# Patient Record
Sex: FEMALE | Race: BLACK OR AFRICAN AMERICAN | Employment: UNEMPLOYED | URBAN - METROPOLITAN AREA
[De-identification: names, ages, dates, MRNs, and addresses within clinical notes are randomized per-mention and may not be internally consistent; named-entity substitution may affect disease eponyms.]

---

## 2017-01-16 RX ORDER — BUDESONIDE AND FORMOTEROL FUMARATE DIHYDRATE 160; 4.5 UG/1; UG/1
AEROSOL RESPIRATORY (INHALATION)
Qty: 10.2 INHALER | Refills: 0 | Status: SHIPPED | OUTPATIENT
Start: 2017-01-16 | End: 2017-01-31 | Stop reason: SDUPTHER

## 2017-01-31 ENCOUNTER — OFFICE VISIT (OUTPATIENT)
Dept: INTERNAL MEDICINE CLINIC | Facility: CLINIC | Age: 67
End: 2017-01-31

## 2017-01-31 VITALS
RESPIRATION RATE: 18 BRPM | HEART RATE: 64 BPM | BODY MASS INDEX: 32.48 KG/M2 | HEIGHT: 66 IN | DIASTOLIC BLOOD PRESSURE: 93 MMHG | SYSTOLIC BLOOD PRESSURE: 156 MMHG | TEMPERATURE: 98.1 F | OXYGEN SATURATION: 97 % | WEIGHT: 202.1 LBS

## 2017-01-31 DIAGNOSIS — R06.2 WHEEZING: ICD-10-CM

## 2017-01-31 DIAGNOSIS — J40 BRONCHITIS: Primary | ICD-10-CM

## 2017-01-31 RX ORDER — AZITHROMYCIN 250 MG/1
TABLET, FILM COATED ORAL
Qty: 6 TAB | Refills: 0 | Status: SHIPPED | OUTPATIENT
Start: 2017-01-31 | End: 2017-08-27

## 2017-01-31 RX ORDER — BUDESONIDE AND FORMOTEROL FUMARATE DIHYDRATE 160; 4.5 UG/1; UG/1
AEROSOL RESPIRATORY (INHALATION)
Qty: 10.2 INHALER | Refills: 2 | Status: SHIPPED | OUTPATIENT
Start: 2017-01-31 | End: 2018-02-24

## 2017-01-31 RX ORDER — ALBUTEROL SULFATE 90 UG/1
1 AEROSOL, METERED RESPIRATORY (INHALATION)
Qty: 1 INHALER | Refills: 2 | Status: SHIPPED | OUTPATIENT
Start: 2017-01-31 | End: 2019-01-25 | Stop reason: SDUPTHER

## 2017-01-31 RX ORDER — CHOLECALCIFEROL (VITAMIN D3) 125 MCG
CAPSULE ORAL
COMMUNITY
End: 2019-10-17

## 2017-01-31 RX ORDER — METHYLPREDNISOLONE 4 MG/1
TABLET ORAL
Qty: 1 DOSE PACK | Refills: 0 | Status: SHIPPED | OUTPATIENT
Start: 2017-01-31 | End: 2017-02-06

## 2017-01-31 NOTE — MR AVS SNAPSHOT
Visit Information Date & Time Provider Department Dept. Phone Encounter #  
 1/31/2017  8:00 AM Benjamin Michele MD [facility] [phone] [id number] Follow-up Instructions Return if symptoms worsen or fail to improve, for follow up. Your Appointments 3/21/2017 10:00 AM  
ROUTINE CARE with Benjamin Michele MD  
[facility] Appt Note: 3 month f/up $0CP 12/21/2016 DG  
 [address] Upcoming Health Maintenance Date Due Hepatitis C Screening 1950 DTaP/Tdap/Td series (1 - Tdap) 6/19/1972 FOBT Q 1 YEAR AGE 50-75 6/19/2001 ZOSTER VACCINE AGE 60> 6/19/2011 GLAUCOMA SCREENING Q2Y 6/19/2016 OSTEOPOROSIS SCREENING (DEXA) 6/19/2016 MEDICARE YEARLY EXAM 6/19/2016 INFLUENZA AGE 9 TO ADULT 8/1/2016 Pneumococcal 65+ Low/Medium Risk (2 of 2 - PCV13) 12/7/2017 BREAST CANCER SCRN MAMMOGRAM 11/16/2018 Allergies as of 1/31/2017  Review Complete On: 1/31/2017 By: Tana Moeller LPN Severity Noted Reaction Type Reactions Penicillins  01/13/2016    Hives, Swelling SWELLING OF TONGUE Current Immunizations  Reviewed on 12/7/2016 Name Date Influenza Vaccine [place]) 10/21/2015 Pneumococcal Polysaccharide (PPSV-23) 12/7/2016 Not reviewed this visit You Were Diagnosed With   
  
 Codes Comments Bronchitis    -  Primary ICD-10-CM: J65 ICD-9-CM: 639 Wheezing     ICD-10-CM: R06.2 ICD-9-CM: 786.07 Vitals BP Pulse Temp Resp Height(growth percentile) Weight(growth percentile) (!) 156/93 (BP 1 Location: Left arm, BP Patient Position: Sitting) 64 98.1 °F (36.7 °C) (Oral) 18 5' 6\" (1.676 m) 202 lb 1.6 oz (91.7 kg) LMP SpO2 BMI OB Status Smoking Status (LMP Unknown) 97% 32.62 kg/m2 Postmenopausal Never Smoker Vitals History BMI and BSA Data Body Mass Index Body Surface Area  
 32.62 kg/m 2 2.07 m 2 Preferred Pharmacy Pharmacy Name Phone [id number] [place] - [address] [phone] Your Updated Medication List  
  
   
This list is accurate as of: 1/31/17  8:49 AM.  Always use your most recent med list.  
  
  
  
  
 albuterol 90 mcg/actuation inhaler Commonly known as:  PROVENTIL HFA, VENTOLIN HFA, PROAIR HFA Take 1 Puff by inhalation every four (4) hours as needed for Wheezing. * azithromycin 250 mg tablet Commonly known as:  Elsworth Heaps Take 2 tabs po x day 1 then 1 tab po daily days 2-5 * azithromycin 250 mg tablet Commonly known as:  Elsworth Heaps Take 2 tabs po x day 1 then 1 tab po daily days 2-5 AVERY 10-40 mg Tab Generic drug:  amLODIPine-Olmesartan TAKE 1 TAB BY MOUTH DAILY. budesonide-formoterol 160-4.5 mcg/actuation HFA inhaler Commonly known as:  SYMBICORT  
INHALE 2 PUFFS BY MOUTH TWICE A DAY Cetirizine 10 mg Cap Take  by mouth.  
  
 cyanocobalamin 1,000 mcg tablet Take 1,000 mcg by mouth daily. cyclobenzaprine 10 mg tablet Commonly known as:  FLEXERIL  
1 tab po q 8 hours prn muscle spasm  
  
 fexofenadine 60 mg tablet Commonly known as:  [place] Plenty Take 1 Tab by mouth two (2) times a day. linaclotide 145 mcg Cap capsule Commonly known as:  Keo Manju Take 1 Cap by mouth Daily (before breakfast). methylPREDNISolone 4 mg tablet Commonly known as:  MEDROL DOSEPACK  
use as directed VITAMIN D3 2,000 unit Tab Generic drug:  cholecalciferol (vitamin D3) Take  by mouth. * Notice: This list has 2 medication(s) that are the same as other medications prescribed for you. Read the directions carefully, and ask your doctor or other care provider to review them with you. Prescriptions Sent to Pharmacy Refills methylPREDNISolone (MEDROL DOSEPACK) 4 mg tablet 0 Sig: use as directed Class: Normal  
 [id number] [address] Ph #: [phone]  
 azithromycin (ZITHROMAX) 250 mg tablet 0 Sig: Take 2 tabs po x day 1 then 1 tab po daily days 2-5 Class: Normal  
 [id number] [address] Ph #: [phone]  
 albuterol (PROVENTIL HFA, VENTOLIN HFA, PROAIR HFA) 90 mcg/actuation inhaler 2 Sig: Take 1 Puff by inhalation every four (4) hours as needed for Wheezing. Class: Normal  
 Pharmacy: [address] Ph #: [phone] Route: Inhalation  
 budesonide-formoterol (SYMBICORT) 160-4.5 mcg/actuation HFA inhaler 2 Sig: INHALE 2 PUFFS BY MOUTH TWICE A DAY Class: Normal  
 Pharmacy: [address] Ph #: [phone] Follow-up Instructions Return if symptoms worsen or fail to improve, for follow up. To-Do List   
 02/28/2017 Imaging:  XR CHEST PA LAT Introducing [facility] SERVICES! [facility] introduces [employer] patient portal. Now you can access parts of your medical record, email your doctor's office, and request medication refills online. 1. In your internet browser, go to [email or web address] 2. Click on the First Time User? Click Here link in the Sign In box. You will see the New Member Sign Up page. 3. Enter your [employer] Access Code exactly as it appears below. You will not need to use this code after youve completed the sign-up process. If you do not sign up before the expiration date, you must request a new code. · [employer] Access Code: [email or web address] Expires: 3/7/2017 10:32 AM 
 
 4. Enter the last four digits of your Social Security Number (xxxx) and Date of Birth (mm/dd/yyyy) as indicated and click Submit. You will be taken to the next sign-up page. 5. Create a AchieveIt Online ID. This will be your Achie[employer] Online login ID and cannot be changed, so think of one that is secure and easy to remember. 6. Create a Achie[employer] Online password. You can change your password at any time. 7. Enter your Password Reset Question and Answer. This can be used at a later time if you forget your password. 8. Enter your e-mail address. You will receive e-mail notification when new information is available in [address] 9. Click Sign Up. You can now view and download portions of your medical record. 10. Click the Download Summary menu link to download a portable copy of your medical information. If you have questions, please visit the Frequently Asked Questions section of the [employer] Online website. Remember, [employer] is NOT to be used for urgent needs. For medical emergencies, dial 911. Now available from your iPhone and Android! Please provide this summary of care documentation to your next provider. Your primary care clinician is listed as Jessica Bruno. If you have any questions after today's visit, please call [phone].

## 2017-01-31 NOTE — PROGRESS NOTES
Room 1    Chief Complaint   Patient presents with    Wheezing     Patient states she wheezes more at night. Uses inhalers. States she doesn't think they are working. Has taken antibiotics previously     1. Have you been to the ER, urgent care clinic since your last visit? Hospitalized since your last visit? No    2. Have you seen or consulted any other health care providers outside of the [address] [place] [address] since your last visit? Include any pap smears or colon screening. No     Health Maintenance Due   Topic Date Due    Hepatitis C Screening  1950    DTaP/Tdap/Td series (1 - Tdap) 06/19/1972    FOBT Q 1 YEAR AGE 50-75  06/19/2001    ZOSTER VACCINE AGE 60>  06/19/2011    GLAUCOMA SCREENING Q2Y  06/19/2016    OSTEOPOROSIS SCREENING (DEXA)  06/19/2016    MEDICARE YEARLY EXAM  06/19/2016    INFLUENZA AGE 9 TO ADULT  08/01/2016     Advance directives reviewed.  Patient received information previously

## 2017-01-31 NOTE — PROGRESS NOTES
Subjective:      Britney Celeste is a 73 y.o. female who presents today for   Chief Complaint   Patient presents with    Wheezing     Patient states she wheezes more at night. Uses inhalers. States she doesn't think they are working. Has taken antibiotics previously     Patient says she is still having coughing and wheezing. A month ago was given a zpack. Using symbicort  twice daily and is using albuterol as needed sometimes every 4 hours. She says she coughs and wheezes at night and sometimes feels like mucus is \"stuck\" in chest. She takes Allegra daily. Patient Active Problem List    Diagnosis Date Noted    Essential hypertension with goal blood pressure less than 140/90 05/27/2016    Allergic rhinitis 05/27/2016    Lipoma 11/23/2015     Current Outpatient Prescriptions   Medication Sig Dispense Refill    cholecalciferol, vitamin D3, (VITAMIN D3) 2,000 unit tab Take  by mouth.  SYMBICORT 160-4.5 mcg/actuation HFA inhaler INHALE 2 PUFFS BY MOUTH TWICE A DAY 10.2 Inhaler 0    albuterol (PROVENTIL HFA, VENTOLIN HFA, PROAIR HFA) 90 mcg/actuation inhaler Take 1 Puff by inhalation every four (4) hours as needed for Wheezing. 1 Inhaler 1    AVERY 10-40 mg tab TAKE 1 TAB BY MOUTH DAILY. 30 Tab 2    fexofenadine (ALLEGRA) 60 mg tablet Take 1 Tab by mouth two (2) times a day. 60 Tab 3    Cetirizine 10 mg cap Take  by mouth.  cyanocobalamin 1,000 mcg tablet Take 1,000 mcg by mouth daily.  cyclobenzaprine (FLEXERIL) 10 mg tablet 1 tab po q 8 hours prn muscle spasm 30 Tab 1    azithromycin (ZITHROMAX) 250 mg tablet Take 2 tabs po x day 1 then 1 tab po daily days 2-5 6 Tab 0    linaclotide (LINZESS) 145 mcg cap capsule Take 1 Cap by mouth Daily (before breakfast).  30 Cap 3     Allergies   Allergen Reactions    Penicillins Hives and Swelling     SWELLING OF TONGUE     Past Medical History   Diagnosis Date    Adverse effect of anesthesia      WOKE UP 1X DURING PROCEDURE    Arthritis     Environmental allergies     GERD (gastroesophageal reflux disease)     Heart murmur     Hypertension     Lipoma 11/23/2015    Nausea & vomiting      ONLY NAUSEA    PUD (peptic ulcer disease)      Past Surgical History   Procedure Laterality Date    Hx gi       COLONOSCOPY    Hx retinal detachment repair Left     Hx gyn       EXPLORATORY LAP    Hx other surgical  1988     removed lipoma of head    Hx other surgical  1/21/16      LOU for pinched nerve in back    Hx other surgical  1/27/16     EXCISE RECURRENT LIPOMA POSTERIOR SCALP     Family History   Problem Relation Age of Onset    Hypertension Mother     Kidney Disease Mother      WAS ON DIALYSIS    Cancer Father      leukemia    Hypertension Sister     Other Brother      BRAIN ANEURYSM    Hypertension Brother     Anesth Problems Neg Hx      Social History   Substance Use Topics    Smoking status: Never Smoker    Smokeless tobacco: Never Used    Alcohol use 2.4 oz/week     4 Glasses of wine per week        Review of Systems    A comprehensive review of systems was negative except for that written in the HPI. Objective:     Visit Vitals    BP (!) 156/93 (BP 1 Location: Left arm, BP Patient Position: Sitting)    Pulse 64    Temp 98.1 °F (36.7 °C) (Oral)    Resp 18    Ht 5' 6\" (1.676 m)    Wt 202 lb 1.6 oz (91.7 kg)    LMP  (LMP Unknown)    SpO2 97%    BMI 32.62 kg/m2     General:  Alert, cooperative, no distress, appears stated age. Head:  Normocephalic, without obvious abnormality, atraumatic. Eyes:  Conjunctivae/corneas clear. PERRL, EOMs intact. Fundi benign. Ears:  Normal TMs and external ear canals both ears. Nose: Nares normal. Septum midline. Mucosa normal. No drainage or sinus tenderness. Throat: Lips, mucosa, and tongue normal. Teeth and gums normal.   Neck: Supple, symmetrical, trachea midline, no adenopathy, thyroid: no enlargement/tenderness/nodules, no carotid bruit and no JVD.    Back:   Symmetric, no curvature. ROM normal. No CVA tenderness. Lungs:   Clear to auscultation bilaterally. Chest wall:  No tenderness or deformity. Heart:  Regular rate and rhythm, S1, S2 normal, no murmur, click, rub or gallop. Abdomen:   Soft, non-tender. Bowel sounds normal. No masses,  No organomegaly. Extremities: Extremities normal, atraumatic, no cyanosis or edema. Pulses: 2+ and symmetric all extremities. Skin: Skin color, texture, turgor normal. No rashes or lesions. Lymph nodes: Cervical, supraclavicular, and axillary nodes normal.   Neurologic: CNII-XII intact. Normal strength, sensation and reflexes throughout. Assessment/Plan:       ICD-10-CM ICD-9-CM    1. Bronchitis J40 490 XR CHEST PA LAT   2. Wheezing R06.2 786.07 XR CHEST PA LAT     Change allegra to zyrtec daily  Medrol dose pack  Chest xray order today  Discontinue OTC cold medicines as bp is elevated    Follow-up Disposition: Not on File   Advised her to call back or return to office if symptoms worsen/change/persist.  Discussed expected course/resolution/complications of diagnosis in detail with patient. Medication risks/benefits/costs/interactions/alternatives discussed with patient. She was given an after visit summary which includes diagnoses, current medications, & vitals. She expressed understanding with the diagnosis and plan.

## 2017-02-01 ENCOUNTER — HOSPITAL ENCOUNTER (OUTPATIENT)
Dept: GENERAL RADIOLOGY | Age: 67
Discharge: HOME | End: 2017-02-01
Payer: COMMERCIAL

## 2017-02-01 DIAGNOSIS — J40 BRONCHITIS: ICD-10-CM

## 2017-02-01 DIAGNOSIS — R06.2 WHEEZING: ICD-10-CM

## 2017-02-01 PROCEDURE — 71020 XR CHEST PA LAT: CPT

## 2017-02-03 ENCOUNTER — TELEPHONE (OUTPATIENT)
Dept: INTERNAL MEDICINE CLINIC | Facility: CLINIC | Age: 67
End: 2017-02-03

## 2017-02-03 NOTE — TELEPHONE ENCOUNTER
----- Message from Sharri García sent at 2/3/2017  2:21 PM EST -----  Regarding: Dr Gato Sanchez returning call.  Best contact [phone]

## 2017-02-06 NOTE — PROGRESS NOTES
Per Dr. Raffi Ortega, pt informed that her chest x-ray was normal. Pt voiced understanding.  Christie Molina LPN

## 2017-02-09 ENCOUNTER — TELEPHONE (OUTPATIENT)
Dept: INTERNAL MEDICINE CLINIC | Facility: CLINIC | Age: 67
End: 2017-02-09

## 2017-02-09 DIAGNOSIS — R05.9 COUGH: Primary | ICD-10-CM

## 2017-02-09 DIAGNOSIS — R06.02 SOB (SHORTNESS OF BREATH): ICD-10-CM

## 2017-02-09 NOTE — TELEPHONE ENCOUNTER
----- Message from [employer] sent at 2/8/2017  5:08 PM EST -----  Regarding: Dr. Chelsea García Telephone  Patient would like to see if she can get a Nebulizer, inhaler is not working.  Contact is [phone]

## 2017-02-10 NOTE — TELEPHONE ENCOUNTER
Please provide me with form to order nebulizer    Also let patient know i placed a referral to pulmonary.  i would like her to be evaluated

## 2017-04-12 ENCOUNTER — HOSPITAL ENCOUNTER (OUTPATIENT)
Dept: GENERAL RADIOLOGY | Age: 67
Discharge: HOME | End: 2017-04-12
Payer: COMMERCIAL

## 2017-04-12 DIAGNOSIS — R06.02 SHORTNESS OF BREATH: ICD-10-CM

## 2017-04-12 PROCEDURE — 71020 XR CHEST PA LAT: CPT

## 2017-05-16 RX ORDER — AMLODIPINE BESYLATE AND OLMESARTAN MEDOXOMIL 10; 20 MG/1; MG/1
TABLET, FILM COATED ORAL
Qty: 30 TAB | Refills: 5 | Status: SHIPPED | OUTPATIENT
Start: 2017-05-16 | End: 2018-01-27 | Stop reason: SDUPTHER

## 2017-08-08 ENCOUNTER — TELEPHONE (OUTPATIENT)
Dept: INTERNAL MEDICINE CLINIC | Facility: CLINIC | Age: 67
End: 2017-08-08

## 2017-08-08 NOTE — TELEPHONE ENCOUNTER
Called and spoke to pharmacist who states that insurance will not cover the Symbicort. A new medication is needed to replace. Suggestions are been faxed from pharmacy.

## 2017-08-08 NOTE — TELEPHONE ENCOUNTER
Patient mentioned that she never received a response regarding her prior authorization in regards to her Symbicot. Please started the process, if it haven't been started already. Patient have been out of her medication for almost a month now. Please advise!

## 2017-08-09 NOTE — TELEPHONE ENCOUNTER
Called and spoke with insurance company completed P. A for symbicort which was approved. According to pharmacist pt will still have a copay of 61.00$.  Adonis Escudero LPN

## 2017-08-21 ENCOUNTER — OFFICE VISIT (OUTPATIENT)
Dept: INTERNAL MEDICINE CLINIC | Facility: CLINIC | Age: 67
End: 2017-08-21

## 2017-08-21 VITALS
RESPIRATION RATE: 18 BRPM | DIASTOLIC BLOOD PRESSURE: 79 MMHG | BODY MASS INDEX: 32.47 KG/M2 | HEIGHT: 66 IN | HEART RATE: 60 BPM | OXYGEN SATURATION: 100 % | TEMPERATURE: 98.7 F | SYSTOLIC BLOOD PRESSURE: 168 MMHG | WEIGHT: 202 LBS

## 2017-08-21 DIAGNOSIS — R05.9 COUGH: Primary | ICD-10-CM

## 2017-08-21 DIAGNOSIS — R06.83 SNORING: ICD-10-CM

## 2017-08-21 RX ORDER — OMEPRAZOLE 20 MG/1
20 CAPSULE, DELAYED RELEASE ORAL DAILY
Qty: 30 CAP | Refills: 1 | Status: SHIPPED | OUTPATIENT
Start: 2017-08-21 | End: 2017-10-23 | Stop reason: SDUPTHER

## 2017-08-21 NOTE — PROGRESS NOTES
Chief Complaint   Patient presents with    Medication Evaluation     wheezing continues. 1. Have you been to the ER, urgent care clinic since your last visit? Hospitalized since your last visit? No    2. Have you seen or consulted any other health care providers outside of the [address] [place] [address] since your last visit? Include any pap smears or colon screening.  No

## 2017-08-21 NOTE — MR AVS SNAPSHOT
Visit Information Date & Time Provider Department Dept. Phone Encounter #  
 8/21/2017  4:15 PM Lucy Gracie, [address] [facility] [phone] [id number] Follow-up Instructions Return in about 4 weeks (around 9/18/2017) for follow up on cough,wheezing. Upcoming Health Maintenance Date Due Hepatitis C Screening 1950 DTaP/Tdap/Td series (1 - Tdap) 6/19/1972 FOBT Q 1 YEAR AGE 50-75 6/19/2001 ZOSTER VACCINE AGE 60> 4/19/2011 GLAUCOMA SCREENING Q2Y 6/19/2016 OSTEOPOROSIS SCREENING (DEXA) 6/19/2016 MEDICARE YEARLY EXAM 6/19/2016 INFLUENZA AGE 9 TO ADULT 8/1/2017 Pneumococcal 65+ Low/Medium Risk (2 of 2 - PCV13) 12/7/2017 BREAST CANCER SCRN MAMMOGRAM 11/16/2018 Allergies as of 8/21/2017  Review Complete On: 1/31/2017 By: Bianca Shock, LPN Severity Noted Reaction Type Reactions Penicillins  01/13/2016    Hives, Swelling SWELLING OF TONGUE Current Immunizations  Reviewed on 12/7/2016 Name Date Influenza Vaccine [place] Blight) 10/21/2015 Pneumococcal Polysaccharide (PPSV-23) 12/7/2016 Not reviewed this visit You Were Diagnosed With   
  
 Codes Comments Cough    -  Primary ICD-10-CM: Y54 ICD-9-CM: 786.2 Snoring     ICD-10-CM: R06.83 
ICD-9-CM: 786.09 Vitals BP Pulse Temp Resp Height(growth percentile) Weight(growth percentile) 168/79 (BP 1 Location: Left arm, BP Patient Position: Sitting) 60 98.7 °F (37.1 °C) (Oral) 18 5' 6\" (1.676 m) 202 lb (91.6 kg) LMP SpO2 BMI OB Status Smoking Status (LMP Unknown) 100% 32.6 kg/m2 Postmenopausal Never Smoker Vitals History BMI and BSA Data Body Mass Index Body Surface Area  
 32.6 kg/m 2 2.07 m 2 Preferred Pharmacy Pharmacy Name Phone [id number] [address] [phone] Your Updated Medication List  
  
   
 This list is accurate as of: 8/21/17  5:34 PM.  Always use your most recent med list.  
  
  
  
  
 albuterol 90 mcg/actuation inhaler Commonly known as:  PROVENTIL HFA, VENTOLIN HFA, PROAIR HFA Take 1 Puff by inhalation every four (4) hours as needed for Wheezing. azithromycin 250 mg tablet Commonly known as:  April Caller Take 2 tabs po x day 1 then 1 tab po daily days 2-5 * AVERY 10-40 mg Tab Generic drug:  amLODIPine-Olmesartan TAKE 1 TAB BY MOUTH DAILY. * AVERY 10-20 mg Tab Generic drug:  amLODIPine-Olmesartan TAKE 1 TABLET BY MOUTH EVERY DAY  
  
 budesonide-formoterol 160-4.5 mcg/actuation HFA inhaler Commonly known as:  SYMBICORT  
INHALE 2 PUFFS BY MOUTH TWICE A DAY Cetirizine 10 mg Cap Take  by mouth.  
  
 cyanocobalamin 1,000 mcg tablet Take 1,000 mcg by mouth daily. cyclobenzaprine 10 mg tablet Commonly known as:  FLEXERIL  
1 tab po q 8 hours prn muscle spasm  
  
 fexofenadine 60 mg tablet Commonly known as:  Kiah Aurora Take 1 Tab by mouth two (2) times a day. omeprazole 20 mg capsule Commonly known as:  PRILOSEC Take 1 Cap by mouth daily. VITAMIN D3 2,000 unit Tab Generic drug:  cholecalciferol (vitamin D3) Take  by mouth. * Notice: This list has 2 medication(s) that are the same as other medications prescribed for you. Read the directions carefully, and ask your doctor or other care provider to review them with you. Prescriptions Sent to Pharmacy Refills  
 omeprazole (PRILOSEC) 20 mg capsule 1 Sig: Take 1 Cap by mouth daily. Class: Normal  
 Pharmacy: [facility]acy [address] [facility] Ph #: [phone] Route: Oral  
  
We Performed the Following REFERRAL TO PULMONARY DISEASE [YAJ62 Custom] Comments:  
 Ramila Wagner. MD Kwame 
[facility] [address] [facility] [address] [facility], [address] [facility] [address] [facility] Phone: [phone] Fax: [phone] REFERRAL TO SLEEP STUDIES [REF99 Custom] Follow-up Instructions Return in about 4 weeks (around 9/18/2017) for follow up on cough,wheezing. Referral Information Referral ID Referred By Referred To  
  
 [id number] Fabienne Lim [facility] [address] [facility], [address] Visits Status Start Date End Date 1 New Request 8/21/17 8/21/18 If your referral has a status of pending review or denied, additional information will be sent to support the outcome of this decision. Referral ID Referred By Referred To  
 [id number] Bhavin Riggs, [address] Phone: [phone] Fax: [phone] Visits Status Start Date End Date 1 New Request 8/21/17 8/21/18 If your referral has a status of pending review or denied, additional information will be sent to support the outcome of this decision. Introducing [facility] SERVICES! Mariann Lynn introduces [employer] patient portal. Now you can access parts of your medical record, email your doctor's office, and request medication refills online. 1. In your internet browser, go to [email or web address] 2. Click on the First Time User? Click Here link in the Sign In box. You will see the New Member Sign Up page. 3. Enter your TradingView Access Code exactly as it appears below. You will not need to use this code after youve completed the sign-up process. If you do not sign up before the expiration date, you must request a new code. · Trading[employer] Access Code: [email or web address] Expires: 11/19/2017  3:57 PM 
 
4. Enter the last four digits of your Social Security Number (xxxx) and Date of Birth (mm/dd/yyyy) as indicated and click Submit. You will be taken to the next sign-up page. 5. Create a TradingView ID.  This will be your TradingView login ID and cannot be changed, so think of one that is secure and easy to remember. 6. Create a [employer] password. You can change your password at any time. 7. Enter your Password Reset Question and Answer. This can be used at a later time if you forget your password. 8. Enter your e-mail address. You will receive e-mail notification when new information is available in [address] 9. Click Sign Up. You can now view and download portions of your medical record. 10. Click the Download Summary menu link to download a portable copy of your medical information. If you have questions, please visit the Frequently Asked Questions section of the [employer] website. Remember, [employer] is NOT to be used for urgent needs. For medical emergencies, dial 911. Now available from your iPhone and Android! Please provide this summary of care documentation to your next provider. Your primary care clinician is listed as Molly Hurley. If you have any questions after today's visit, please call [phone].

## 2017-08-21 NOTE — PROGRESS NOTES
Subjective:      Sergio Mccullough is a 78 y.o. female who presents today for No chief complaint on file.    htn- patient states she is compliant with her medication     Hyperlipidemia-diet controlled, can not tolerate statins     HSV 1 and 2 antibody positive per most recent labs     Vit D deficiency- Patient says she is taking an OTC Vit D supplement      patient still c/o mucus and sob. She denies any cp. She denies any leg swelling. She feels hoarse, she has a dry cough, wheezing. She ran out of her symbicort and symptoms  got worse. She has tried zpack x 2, medrol pack, albuterol, symbicort. She has a thick white sputum. She has no smoking history. She has tried allegra and zyrtec for allergies however symptoms persist.  On omeprazole for acid reflux. Chest xray 1/2017 and 4/2017 both normal. Most recent EKG in January showed sinus pablo      Patient Active Problem List    Diagnosis Date Noted    Essential hypertension with goal blood pressure less than 140/90 05/27/2016    Allergic rhinitis 05/27/2016    Lipoma 11/23/2015     Current Outpatient Prescriptions   Medication Sig Dispense Refill    AVERY 10-20 mg tab TAKE 1 TABLET BY MOUTH EVERY DAY 30 Tab 5    cholecalciferol, vitamin D3, (VITAMIN D3) 2,000 unit tab Take  by mouth.  azithromycin (ZITHROMAX) 250 mg tablet Take 2 tabs po x day 1 then 1 tab po daily days 2-5 6 Tab 0    albuterol (PROVENTIL HFA, VENTOLIN HFA, PROAIR HFA) 90 mcg/actuation inhaler Take 1 Puff by inhalation every four (4) hours as needed for Wheezing. 1 Inhaler 2    budesonide-formoterol (SYMBICORT) 160-4.5 mcg/actuation HFA inhaler INHALE 2 PUFFS BY MOUTH TWICE A DAY 10.2 Inhaler 2    AVERY 10-40 mg tab TAKE 1 TAB BY MOUTH DAILY. 30 Tab 2    fexofenadine (ALLEGRA) 60 mg tablet Take 1 Tab by mouth two (2) times a day. 60 Tab 3    Cetirizine 10 mg cap Take  by mouth.  cyanocobalamin 1,000 mcg tablet Take 1,000 mcg by mouth daily.       cyclobenzaprine (FLEXERIL) 10 mg tablet 1 tab po q 8 hours prn muscle spasm 30 Tab 1     Allergies   Allergen Reactions    Penicillins Hives and Swelling     SWELLING OF TONGUE     Past Medical History:   Diagnosis Date    Adverse effect of anesthesia     WOKE UP 1X DURING PROCEDURE    Arthritis     Environmental allergies     GERD (gastroesophageal reflux disease)     Heart murmur     Hypertension     Lipoma 11/23/2015    Nausea & vomiting     ONLY NAUSEA    PUD (peptic ulcer disease)      Past Surgical History:   Procedure Laterality Date    HX GI      COLONOSCOPY    HX GYN      EXPLORATORY LAP    HX OTHER SURGICAL  1988    removed lipoma of head    HX OTHER SURGICAL  1/21/16     LOU for pinched nerve in back    HX OTHER SURGICAL  1/27/16    EXCISE RECURRENT LIPOMA POSTERIOR SCALP    HX RETINAL DETACHMENT REPAIR Left      Family History   Problem Relation Age of Onset    Hypertension Mother     Kidney Disease Mother      WAS ON DIALYSIS    Cancer Father      leukemia    Hypertension Sister     Other Brother      BRAIN ANEURYSM    Hypertension Brother     Anesth Problems Neg Hx      Social History   Substance Use Topics    Smoking status: Never Smoker    Smokeless tobacco: Never Used    Alcohol use 2.4 oz/week     4 Glasses of wine per week        Review of Systems    A comprehensive review of systems was negative except for that written in the HPI. Objective:     Visit Vitals    LMP  (LMP Unknown)     General:  Alert, cooperative, no distress, appears stated age. Head:  Normocephalic, without obvious abnormality, atraumatic. Eyes:  Conjunctivae/corneas clear. PERRL, EOMs intact. Fundi benign. Ears:  Normal TMs and external ear canals both ears. Nose: Nares normal. Septum midline. Mucosa normal. No drainage or sinus tenderness.    Throat: Lips, mucosa, and tongue normal. Teeth and gums normal.   Neck: Supple, symmetrical, trachea midline, no adenopathy, thyroid: no enlargement/tenderness/nodules, no carotid bruit and no JVD. Back:   Symmetric, no curvature. ROM normal. No CVA tenderness. Lungs:   Clear to auscultation bilaterally. Chest wall:  No tenderness or deformity. Heart:  Regular rate and rhythm, S1, S2 normal, no murmur, click, rub or gallop. Abdomen:   Soft, non-tender. Bowel sounds normal. No masses,  No organomegaly. Extremities: Extremities normal, atraumatic, no cyanosis or edema. Pulses: 2+ and symmetric all extremities. Skin: Skin color, texture, turgor normal. No rashes or lesions. Lymph nodes: Cervical, supraclavicular, and axillary nodes normal.   Neurologic: CNII-XII intact. Normal strength, sensation and reflexes throughout. Assessment/Plan:       ICD-10-CM ICD-9-CM    1. Cough R05 786.2 REFERRAL TO PULMONARY DISEASE      REFERRAL TO SLEEP STUDIES   2. Snoring R06.83 786.09 REFERRAL TO SLEEP STUDIES   3. Hypertension- BP not well controlled. Resume full tablet of Lesli    Follow-up Disposition: Not on File   Advised her to call back or return to office if symptoms worsen/change/persist.  Discussed expected course/resolution/complications of diagnosis in detail with patient. Medication risks/benefits/costs/interactions/alternatives discussed with patient. She was given an after visit summary which includes diagnoses, current medications, & vitals. She expressed understanding with the diagnosis and plan.

## 2017-09-05 RX ORDER — FEXOFENADINE HCL 60 MG
TABLET ORAL
Qty: 60 TAB | Refills: 0 | Status: SHIPPED | OUTPATIENT
Start: 2017-09-05 | End: 2017-10-12 | Stop reason: SDUPTHER

## 2017-10-13 RX ORDER — FEXOFENADINE HCL 60 MG
TABLET ORAL
Qty: 60 TAB | Refills: 0 | Status: SHIPPED | OUTPATIENT
Start: 2017-10-13 | End: 2018-11-27 | Stop reason: SDUPTHER

## 2017-10-25 RX ORDER — OMEPRAZOLE 20 MG/1
CAPSULE, DELAYED RELEASE ORAL
Qty: 30 CAP | Refills: 1 | Status: SHIPPED | OUTPATIENT
Start: 2017-10-25 | End: 2021-09-24

## 2018-01-10 ENCOUNTER — DOCUMENTATION ONLY (OUTPATIENT)
Dept: SLEEP MEDICINE | Age: 68
End: 2018-01-10

## 2018-01-15 ENCOUNTER — OFFICE VISIT (OUTPATIENT)
Dept: SLEEP MEDICINE | Age: 68
End: 2018-01-15

## 2018-01-15 ENCOUNTER — HOSPITAL ENCOUNTER (OUTPATIENT)
Dept: SLEEP MEDICINE | Age: 68
Discharge: HOME | End: 2018-01-15
Payer: COMMERCIAL

## 2018-01-15 VITALS
WEIGHT: 200 LBS | OXYGEN SATURATION: 100 % | BODY MASS INDEX: 32.14 KG/M2 | HEIGHT: 66 IN | SYSTOLIC BLOOD PRESSURE: 140 MMHG | HEART RATE: 62 BPM | DIASTOLIC BLOOD PRESSURE: 81 MMHG

## 2018-01-15 DIAGNOSIS — I10 ESSENTIAL HYPERTENSION WITH GOAL BLOOD PRESSURE LESS THAN 140/90: ICD-10-CM

## 2018-01-15 DIAGNOSIS — Z87.09 H/O ALLERGIC RHINITIS: ICD-10-CM

## 2018-01-15 DIAGNOSIS — G47.33 OSA (OBSTRUCTIVE SLEEP APNEA): Primary | ICD-10-CM

## 2018-01-15 PROCEDURE — 95806 SLEEP STUDY UNATT&RESP EFFT: CPT | Performed by: INTERNAL MEDICINE

## 2018-01-15 NOTE — MR AVS SNAPSHOT
Visit Information Date & Time Provider Department Dept. Phone Encounter #  
 1/15/2018 11:00 AM [facility] [id number] Follow-up Instructions Return if symptoms worsen or fail to improve. Follow-up and Disposition History Upcoming Health Maintenance Date Due Hepatitis C Screening 1950 DTaP/Tdap/Td series (1 - Tdap) 6/19/1972 FOBT Q 1 YEAR AGE 50-75 6/19/2001 ZOSTER VACCINE AGE 60> 4/19/2011 GLAUCOMA SCREENING Q2Y 6/19/2016 OSTEOPOROSIS SCREENING (DEXA) 6/19/2016 MEDICARE YEARLY EXAM 6/19/2016 Influenza Age 5 to Adult 8/1/2017 Pneumococcal 65+ Low/Medium Risk (2 of 2 - PCV13) 12/7/2017 BREAST CANCER SCRN MAMMOGRAM 11/16/2018 Allergies as of 1/15/2018  Review Complete On: 1/15/2018 By: Hayden Hairston MD  
  
 Severity Noted Reaction Type Reactions Penicillins  01/13/2016    Hives, Swelling SWELLING OF TONGUE Current Immunizations  Reviewed on 12/7/2016 Name Date Influenza Vaccine Kandace Nissen) 10/21/2015 Pneumococcal Polysaccharide (PPSV-23) 12/7/2016 Not reviewed this visit You Were Diagnosed With   
  
 Codes Comments MARIELOS (obstructive sleep apnea)    -  Primary ICD-10-CM: G47.33 
ICD-9-CM: 327.23 Essential hypertension with goal blood pressure less than 140/90     ICD-10-CM: I10 
ICD-9-CM: 401.9 BMI 32.0-32.9,adult     ICD-10-CM: T28.65 
ICD-9-CM: V85.32   
 H/O allergic rhinitis     ICD-10-CM: Z87.09 
ICD-9-CM: V12.69 Vitals BP Pulse Height(growth percentile) Weight(growth percentile) LMP SpO2  
 140/81 62 5' 6\" (1.676 m) 200 lb (90.7 kg) (LMP Unknown) 100% BMI OB Status Smoking Status 32.28 kg/m2 Postmenopausal Never Smoker Vitals History BMI and BSA Data Body Mass Index Body Surface Area  
 32.28 kg/m 2 2.06 m 2 Preferred Pharmacy Pharmacy Name Phone [id number] Aaron Jorgensen 23 [phone] Your Updated Medication List  
  
   
This list is accurate as of: 1/15/18 12:07 PM.  Always use your most recent med list.  
  
  
  
  
 albuterol 90 mcg/actuation inhaler Commonly known as:  PROVENTIL HFA, VENTOLIN HFA, PROAIR HFA Take 1 Puff by inhalation every four (4) hours as needed for Wheezing. AVERY 10-20 mg Tab Generic drug:  amLODIPine-Olmesartan TAKE 1 TABLET BY MOUTH EVERY DAY  
  
 budesonide-formoterol 160-4.5 mcg/actuation Hfaa Commonly known as:  SYMBICORT  
INHALE 2 PUFFS BY MOUTH TWICE A DAY Cetirizine 10 mg Cap Take  by mouth.  
  
 cyanocobalamin 1,000 mcg tablet Take 1,000 mcg by mouth daily. cyclobenzaprine 10 mg tablet Commonly known as:  FLEXERIL  
1 tab po q 8 hours prn muscle spasm  
  
 fexofenadine 60 mg tablet Commonly known as:  Quillian Cowman TAKE 1 TAB BY MOUTH TWO (2) TIMES A DAY. omeprazole 20 mg capsule Commonly known as:  PRILOSEC  
TAKE ONE CAPSULE BY MOUTH EVERY DAY  
  
 VITAMIN D3 2,000 unit Tab Generic drug:  cholecalciferol (vitamin D3) Take  by mouth. We Performed the Following SLEEP STUDY UNATTENDED, 4 CHANNEL [id number] CPT(R)] Follow-up Instructions Return if symptoms worsen or fail to improve. Introducing hospitals & HEALTH SERVICES! Darren Colón introduces [employer] patient portal. Now you can access parts of your medical record, email your doctor's office, and request medication refills online. 1. In your internet browser, go to [email or web address] 2. Click on the First Time User? Click Here link in the Sign In box. You will see the New Member Sign Up page. 3. Enter your [employer] Access Code exactly as it appears below. You will not need to use this code after youve completed the sign-up process.  If you do not sign up before the expiration date, you must request a new code. 
 
· [employer] Access Code: [email or web address] Expires: 4/15/2018 11:39 AM 
 
4. Enter the last four digits of your Social Security Number (xxxx) and Date of Birth (mm/dd/yyyy) as indicated and click Submit. You will be taken to the next sign-up page. 5. Create a [employer] ID. This will be your [employer] login ID and cannot be changed, so think of one that is secure and easy to remember. 6. Create a [employer] password. You can change your password at any time. 7. Enter your Password Reset Question and Answer. This can be used at a later time if you forget your password. 8. Enter your e-mail address. You will receive e-mail notification when new information is available in [address] 9. Click Sign Up. You can now view and download portions of your medical record. 10. Click the Download Summary menu link to download a portable copy of your medical information. If you have questions, please visit the Frequently Asked Questions section of the [employer] website. Remember, [employer] is NOT to be used for urgent needs. For medical emergencies, dial 911. Now available from your iPhone and Android! Please provide this summary of care documentation to your next provider. Your primary care clinician is listed as Kassidy Rubin. If you have any questions after today's visit, please call [phone].

## 2018-01-15 NOTE — PROGRESS NOTES
[address] [facility] [place]. [place], [address]  Tel.  [phone]  Fax. 100 [facility] 60  [place], [address] [facility]  Tel.  [phone]  Fax. [phone] [facility] [place] [address] [place]   Tel.  [phone]  Fax. [phone]       S>Alina Sanon is a 78 y.o. female seen today to receive a home sleep testing unit (HST). · Patient was educated on proper hookup and operation of the HST. · Instruction forms and documentation were reviewed and signed. · The patient demonstrated good understanding of the HST. O>    Visit Vitals    /81    Pulse 62    Ht 5' 6\" (1.676 m)    Wt 200 lb (90.7 kg)    LMP  (LMP Unknown)    SpO2 100%    BMI 32.28 kg/m2    Neck circ. in \"inches\": 15    A>  1. MARIELOS (obstructive sleep apnea)    2. Essential hypertension with goal blood pressure less than 140/90    3. BMI 32.0-32.9,adult    4. H/O allergic rhinitis          P>  · General information regarding operations and maintenance of the device was provided. · She was provided information on sleep apnea including coresponding risk factors and the importance of proper treatment. · Follow-up appointment was made to return the HST. She will be contacted once the results have been reviewed. · She was asked to contact our office for any problems regarding her home sleep test study.

## 2018-01-15 NOTE — PROGRESS NOTES
[address] [facility] [place]. [place], [address]  Tel.  [phone]  Fax. 100 [facility] 60  [place], [address] [facility]  Tel.  [phone]  Fax. [phone] [address] [facility] [address] [place]   Tel.  [phone]  Fax. [phone]         Subjective:      Jamilah Johnson is an 78 y.o. female referred for evaluation for a sleep disorder. She complains of snoring associated with snorting, awakening in the middle of the night because of no specific reason. Symptoms began several years ago, gradually worsening since that time. She usually can fall asleep in 30 - 60 minutes. Family or house members note snoring. She denies completely or partially paralyzed while falling asleep or waking up. Jamilah Johnson does wake up frequently at night. She is bothered by waking up too early and left unable to get back to sleep. She actually sleeps about 5 hours at night and wakes up about 3 times during the night. She does work shifts:  First Shift;Second Shift. Tom Valentin indicates she does get too little sleep at night. Her bedtime is 2200. She awakens at 0430. She does not take naps. She has the following observed behaviors: Loud snoring;  . Other remarks:   She denies of an urge to move extremities due to discomfort or other sensation and denies of dream enactment behavior.     Sullivan Sleepiness Score: 4     Allergies   Allergen Reactions    Penicillins Hives and Swelling     SWELLING OF TONGUE         Current Outpatient Prescriptions:     AVERY 10-20 mg tab, TAKE 1 TABLET BY MOUTH EVERY DAY, Disp: 30 Tab, Rfl: 5    cholecalciferol, vitamin D3, (VITAMIN D3) 2,000 unit tab, Take  by mouth., Disp: , Rfl:     albuterol (PROVENTIL HFA, VENTOLIN HFA, PROAIR HFA) 90 mcg/actuation inhaler, Take 1 Puff by inhalation every four (4) hours as needed for Wheezing., Disp: 1 Inhaler, Rfl: 2    budesonide-formoterol (SYMBICORT) 160-4.5 mcg/actuation HFA inhaler, INHALE 2 PUFFS BY MOUTH TWICE A DAY, Disp: 10.2 Inhaler, Rfl: 2    Cetirizine 10 mg cap, Take  by mouth., Disp: , Rfl:     omeprazole (PRILOSEC) 20 mg capsule, TAKE ONE CAPSULE BY MOUTH EVERY DAY, Disp: 30 Cap, Rfl: 1    fexofenadine (ALLEGRA) 60 mg tablet, TAKE 1 TAB BY MOUTH TWO (2) TIMES A DAY., Disp: 60 Tab, Rfl: 0    cyanocobalamin 1,000 mcg tablet, Take 1,000 mcg by mouth daily. , Disp: , Rfl:     cyclobenzaprine (FLEXERIL) 10 mg tablet, 1 tab po q 8 hours prn muscle spasm, Disp: 30 Tab, Rfl: 1     She  has a past medical history of Adverse effect of anesthesia; Arthritis; Environmental allergies; GERD (gastroesophageal reflux disease); Heart murmur; Hypertension; Lipoma (11/23/2015); Nausea & vomiting; and PUD (peptic ulcer disease). She  has a past surgical history that includes hx gi; hx retinal detachment repair (Left); hx gyn; hx other surgical (1988); hx other surgical (1/21/16 ); and hx other surgical (1/27/16). She family history includes Cancer in her father; Hypertension in her brother, mother, and sister; Kidney Disease in her mother; Other in her brother. There is no history of Anesth Problems. She  reports that she has never smoked. She has never used smokeless tobacco. She reports that she drinks about 2.4 oz of alcohol per week  She reports that she does not use illicit drugs.      Review of Systems:  Constitutional:  No significant weight loss or weight gain  Eyes:  No blurred vision  CVS:  No significant chest pain  Pulm:  No significant shortness of breath  GI:  No significant nausea or vomiting  :  No significant nocturia  Musculoskeletal:  No significant joint pain at night  Skin:  No significant rashes  Neuro:  No significant dizziness   Psych:  No active mood issues    Sleep Review of Systems: notable for difficulty falling asleep; frequent awakenings at night;  regular dreaming not reported; no nightmares ; no early morning headaches; no memory problems; no concentration issues; no history of any automobile or occupational accidents due to daytime drowsiness. Objective:     Visit Vitals    /81    Pulse 62    Ht 5' 6\" (1.676 m)    Wt 200 lb (90.7 kg)    LMP  (LMP Unknown)    SpO2 100%    BMI 32.28 kg/m2         General:   Not in acute distress   Eyes:  Anicteric sclerae, no obvious strabismus   Nose:  No obvious nasal septum deviation    Oropharynx:   Class 4 oropharyngeal outlet, thick tongue base, uvula could not be seen due to low-lying soft palate, narrow tonsilo-pharyngeal pilars   Tonsils:   tonsils are not seen due to low-lying soft palate   Neck:   Neck circ. in \"inches\": 15; midline trachea   Chest/Lungs:  Equal lung expansion, clear on auscultation    CVS:  Normal rate, regular rhythm; no JVD   Skin:  Warm to touch; no obvious rashes   Neuro:  No focal deficits ; no obvious tremor    Psych:  Normal affect,  normal countenance;          Assessment:       ICD-10-CM ICD-9-CM    1. MARIELOS (obstructive sleep apnea) G47.33 327.23 SLEEP STUDY UNATTENDED, 4 CHANNEL   2. Essential hypertension with goal blood pressure less than 140/90 I10 401.9    3. BMI 32.0-32.9,adult Z68.32 V85.32    4. H/O allergic rhinitis Z87.09 V12.69          Plan:     * The patient currently has a Low Risk for having sleep apnea. STOP-BANG score 3.  * Sleep testing was ordered for initial evaluation. * She was provided information on sleep apnea including coresponding risk factors and the importance of proper treatment. * Treatment options if indicated were reviewed today. Patient agrees to a trial of PAP therapy if indicated. * Counseling was provided regarding proper sleep hygiene (including effect of light on sleep), paradoxical intention, stimulus and safe driving. * Effect of sleep disturbance on weight was reviewed. We have recommended a dedicated weight loss through appropriate diet and an exercise regiment as significant weight reduction has been shown to reduce severity of obstructive sleep apnea.      * Patient agrees to telephone [phone]  follow-up by myself or lead sleep technologist shortly after sleep study to review results and plan final management.     (patient has given permission for a message to be left regarding test results and further management if patient cannot be cannot be reached directly). Thank you for allowing us to participate in your patient's medical care. We'll keep you updated on these investigations. Donavon Rey MD, FAASM  Electronically signed.  01/15/18

## 2018-01-16 ENCOUNTER — DOCUMENTATION ONLY (OUTPATIENT)
Dept: SLEEP MEDICINE | Age: 68
End: 2018-01-16

## 2018-01-18 ENCOUNTER — TELEPHONE (OUTPATIENT)
Dept: SLEEP MEDICINE | Age: 68
End: 2018-01-18

## 2018-01-18 NOTE — TELEPHONE ENCOUNTER
HSAT Returned-MIDLO    Date of Study: 1/15/18    The following information was gathered from the patients study log:    Further information provided: No log information was provided.

## 2018-01-18 NOTE — TELEPHONE ENCOUNTER
Patient is to be contacted by lead sleep technologist regarding results of Sleep Testing which was indicative of an average AHI of 3.3 per hour with an SpO2 nguyễn of 88% and SpO2 of < 88% being 0 minutes. Patient should return for repeat testing due to presence of significant risk factors for Obstructive Sleep Apnea - STOP- BANG 3.

## 2018-01-19 ENCOUNTER — DOCUMENTATION ONLY (OUTPATIENT)
Dept: SLEEP MEDICINE | Age: 68
End: 2018-01-19

## 2018-01-19 NOTE — PROGRESS NOTES
This note is being routed to Dr. Jimmy Garza.   Sleep Medicine consult note and sleep study report in patient's chart for review.     Thank you for the referral.

## 2018-01-22 ENCOUNTER — TELEPHONE (OUTPATIENT)
Dept: SLEEP MEDICINE | Age: 68
End: 2018-01-22

## 2018-01-22 NOTE — TELEPHONE ENCOUNTER
Left detailed voice message per patient requested when she called to office, she was not able to talk because she was at work.  Informed patient of need to repeat HST due to presence of significant  Risk factors for MARIELOS

## 2018-01-29 RX ORDER — AMLODIPINE BESYLATE AND OLMESARTAN MEDOXOMIL 10; 20 MG/1; MG/1
TABLET, FILM COATED ORAL
Qty: 30 TAB | Refills: 5 | Status: SHIPPED | OUTPATIENT
Start: 2018-01-29 | End: 2018-02-26 | Stop reason: SDUPTHER

## 2018-02-02 ENCOUNTER — DOCUMENTATION ONLY (OUTPATIENT)
Dept: SLEEP MEDICINE | Age: 68
End: 2018-02-02

## 2018-02-05 ENCOUNTER — TELEPHONE (OUTPATIENT)
Dept: SLEEP MEDICINE | Age: 68
End: 2018-02-05

## 2018-02-05 DIAGNOSIS — I10 ESSENTIAL HYPERTENSION WITH GOAL BLOOD PRESSURE LESS THAN 140/90: ICD-10-CM

## 2018-02-05 DIAGNOSIS — G47.33 OSA (OBSTRUCTIVE SLEEP APNEA): Primary | ICD-10-CM

## 2018-02-05 NOTE — TELEPHONE ENCOUNTER
HSAT device was returned however there was no data available to download and this will need to be repeated.

## 2018-02-16 NOTE — TELEPHONE ENCOUNTER
Patient inquired about results of HSAT, informed patient there was no data available and would need to repeat. Patient would like to to an in lab study per she has doen HSAT twice and doesn't want to repeat and she would like more complete testing.  Please send order or please advise

## 2018-02-19 ENCOUNTER — OFFICE VISIT (OUTPATIENT)
Dept: INTERNAL MEDICINE CLINIC | Facility: CLINIC | Age: 68
End: 2018-02-19

## 2018-02-19 ENCOUNTER — TELEPHONE (OUTPATIENT)
Dept: SLEEP MEDICINE | Age: 68
End: 2018-02-19

## 2018-02-19 VITALS
DIASTOLIC BLOOD PRESSURE: 85 MMHG | BODY MASS INDEX: 32.33 KG/M2 | HEIGHT: 66 IN | TEMPERATURE: 97.5 F | RESPIRATION RATE: 18 BRPM | SYSTOLIC BLOOD PRESSURE: 140 MMHG | WEIGHT: 201.2 LBS | HEART RATE: 60 BPM

## 2018-02-19 DIAGNOSIS — R10.13 EPIGASTRIC PAIN: ICD-10-CM

## 2018-02-19 DIAGNOSIS — J01.00 ACUTE MAXILLARY SINUSITIS, RECURRENCE NOT SPECIFIED: Primary | ICD-10-CM

## 2018-02-19 RX ORDER — AZITHROMYCIN 250 MG/1
TABLET, FILM COATED ORAL
Qty: 6 TAB | Refills: 0 | Status: SHIPPED | OUTPATIENT
Start: 2018-02-19 | End: 2018-09-25

## 2018-02-19 NOTE — PROGRESS NOTES
Chief Complaint   Patient presents with    Ear Pain     right sided ear pain, some pain behind her right eye, and right nostril      1. Have you been to the ER, urgent care clinic since your last visit? Hospitalized since your last visit? No    2. Have you seen or consulted any other health care providers outside of the [address] [place] [address] since your last visit? Include any pap smears or colon screening.  No

## 2018-02-19 NOTE — PROGRESS NOTES
Subjective:      Jacquelyn Fuentes is a 78 y.o. female who presents today for   Chief Complaint   Patient presents with    Ear Pain     right sided ear pain, some pain behind her right eye, and right nostril      Patient in today with concerns of right ear. Ear feels clogged. Right side of face hurts also ear on the right. No blood or purulent nasal exudate  She feels a little lightheaded    Patient Active Problem List    Diagnosis Date Noted    Essential hypertension with goal blood pressure less than 140/90 05/27/2016    Allergic rhinitis 05/27/2016    Lipoma 11/23/2015     Current Outpatient Prescriptions   Medication Sig Dispense Refill    AVERY 10-20 mg tab TAKE 1 TABLET BY MOUTH EVERY DAY 30 Tab 5    omeprazole (PRILOSEC) 20 mg capsule TAKE ONE CAPSULE BY MOUTH EVERY DAY 30 Cap 1    fexofenadine (ALLEGRA) 60 mg tablet TAKE 1 TAB BY MOUTH TWO (2) TIMES A DAY. 60 Tab 0    cholecalciferol, vitamin D3, (VITAMIN D3) 2,000 unit tab Take  by mouth.  albuterol (PROVENTIL HFA, VENTOLIN HFA, PROAIR HFA) 90 mcg/actuation inhaler Take 1 Puff by inhalation every four (4) hours as needed for Wheezing. 1 Inhaler 2    cyanocobalamin 1,000 mcg tablet Take 1,000 mcg by mouth daily.  cyclobenzaprine (FLEXERIL) 10 mg tablet 1 tab po q 8 hours prn muscle spasm 30 Tab 1    budesonide-formoterol (SYMBICORT) 160-4.5 mcg/actuation HFA inhaler INHALE 2 PUFFS BY MOUTH TWICE A DAY 10.2 Inhaler 2    Cetirizine 10 mg cap Take  by mouth.        Allergies   Allergen Reactions    Penicillins Hives and Swelling     SWELLING OF TONGUE     Past Medical History:   Diagnosis Date    Adverse effect of anesthesia     WOKE UP 1X DURING PROCEDURE    Arthritis     Environmental allergies     GERD (gastroesophageal reflux disease)     Heart murmur     Hypertension     Lipoma 11/23/2015    Nausea & vomiting     ONLY NAUSEA    PUD (peptic ulcer disease)      Past Surgical History:   Procedure Laterality Date    HX GI COLONOSCOPY    HX GYN      EXPLORATORY LAP    HX OTHER SURGICAL  1988    removed lipoma of head    HX OTHER SURGICAL  1/21/16     LOU for pinched nerve in back    HX OTHER SURGICAL  1/27/16    EXCISE RECURRENT LIPOMA POSTERIOR SCALP    HX RETINAL DETACHMENT REPAIR Left      Family History   Problem Relation Age of Onset    Hypertension Mother     Kidney Disease Mother      WAS ON DIALYSIS    Cancer Father      leukemia    Hypertension Sister     Other Brother      BRAIN ANEURYSM    Hypertension Brother     Anesth Problems Neg Hx      Social History   Substance Use Topics    Smoking status: Never Smoker    Smokeless tobacco: Never Used    Alcohol use 2.4 oz/week     4 Glasses of wine per week      Comment: social        Review of Systems    A comprehensive review of systems was negative except for that written in the HPI. Objective:     Visit Vitals    Resp 18    Ht 5' 6\" (1.676 m)    Wt 201 lb 3.2 oz (91.3 kg)    LMP  (LMP Unknown)    BMI 32.47 kg/m2     General:  Alert, cooperative, no distress, appears stated age. Head:  Normocephalic, without obvious abnormality, atraumatic. Eyes:  Conjunctivae/corneas clear. PERRL, EOMs intact. Fundi benign. Ears:  Normal TMs and external ear canals both ears. Nose: Nares normal. Septum midline. Mucosa erythematous. No drainage positive maxillary sinus tenderness. Throat: Lips, mucosa, and tongue normal. Teeth and gums normal.   Neck: Supple, symmetrical, trachea midline, no adenopathy, thyroid: no enlargement/tenderness/nodules, no carotid bruit and no JVD. Back:   Symmetric, no curvature. ROM normal. No CVA tenderness. Lungs:   Clear to auscultation bilaterally. Chest wall:  No tenderness or deformity. Heart:  Regular rate and rhythm, S1, S2 normal, no murmur, click, rub or gallop. Abdomen:   Soft, non-tender. Bowel sounds normal. No masses,  No organomegaly. Extremities: Extremities normal, atraumatic, no cyanosis or edema. Pulses: 2+ and symmetric all extremities. Skin: Skin color, texture, turgor normal. No rashes or lesions. Lymph nodes: Cervical, supraclavicular, and axillary nodes normal.   Neurologic: CNII-XII intact. Normal strength, sensation and reflexes throughout. Assessment/Plan:       ICD-10-CM ICD-9-CM    1. Acute maxillary sinusitis, recurrence not specified J01.00 461.0 ZPACK  Nasal saline rinses   2. Epigastric pain R10.13 789.06 REFERRAL TO GASTROENTEROLOGY       Follow-up Disposition: Not on File   Advised her to call back or return to office if symptoms worsen/change/persist.  Discussed expected course/resolution/complications of diagnosis in detail with patient. Medication risks/benefits/costs/interactions/alternatives discussed with patient. She was given an after visit summary which includes diagnoses, current medications, & vitals. She expressed understanding with the diagnosis and plan.

## 2018-02-19 NOTE — MR AVS SNAPSHOT
[address] [facility] 
[phone] Patient: Alba Antonio MRN: [id number] HYF:[id number] Visit Information Date & Time Provider Department Dept. Phone Encounter #  
 2/19/2018  1:30 PM Betsey Crawford, [address] [facility] Internal Medicine [phone] [id number] Follow-up Instructions Return if symptoms worsen or fail to improve, for follow up. Your Appointments 4/3/2018 10:15 AM  
COMPLETE PHYSICAL with Betsey Crawford MD  
[facility] Internal Medicine [address] [facility]) Appt Note: CPE w \"fasting\" labs $0CP 02/16/2018 DG  
 [address] Upcoming Health Maintenance Date Due Hepatitis C Screening 1950 DTaP/Tdap/Td series (1 - Tdap) 6/19/1972 FOBT Q 1 YEAR AGE 50-75 6/19/2001 ZOSTER VACCINE AGE 60> 4/19/2011 GLAUCOMA SCREENING Q2Y 6/19/2016 OSTEOPOROSIS SCREENING (DEXA) 6/19/2016 MEDICARE YEARLY EXAM 6/19/2016 Influenza Age 5 to Adult 8/1/2017 Pneumococcal 65+ Low/Medium Risk (2 of 2 - PCV13) 12/7/2017 BREAST CANCER SCRN MAMMOGRAM 11/16/2018 Allergies as of 2/19/2018  Review Complete On: 2/19/2018 By: Silverio Smalls LPN Severity Noted Reaction Type Reactions Penicillins  01/13/2016    Hives, Swelling SWELLING OF TONGUE Current Immunizations  Reviewed on 12/7/2016 Name Date Influenza Vaccine Julane Jaquan) 10/21/2015 Pneumococcal Polysaccharide (PPSV-23) 12/7/2016 Not reviewed this visit You Were Diagnosed With   
  
 Codes Comments Acute maxillary sinusitis, recurrence not specified    -  Primary ICD-10-CM: J01.00 ICD-9-CM: 461.0 Epigastric pain     ICD-10-CM: R10.13 ICD-9-CM: 789.06 Vitals BP Pulse Temp Resp Height(growth percentile) Weight(growth percentile) 140/85 60 97.5 °F (36.4 °C) (Oral) 18 5' 6\" (1.676 m) 201 lb 3.2 oz (91.3 kg) LMP BMI OB Status Smoking Status (LMP Unknown) 32.47 kg/m2 Postmenopausal Never Smoker Vitals History BMI and BSA Data Body Mass Index Body Surface Area  
 32.47 kg/m 2 2.06 m 2 Preferred Pharmacy Pharmacy Name Phone [facility] #[id number] CERVANTES [facility] 23 [phone] Your Updated Medication List  
  
   
This list is accurate as of: 2/19/18  2:03 PM.  Always use your most recent med list.  
  
  
  
  
 albuterol 90 mcg/actuation inhaler Commonly known as:  PROVENTIL HFA, VENTOLIN HFA, PROAIR HFA Take 1 Puff by inhalation every four (4) hours as needed for Wheezing. azithromycin 250 mg tablet Commonly known as:  Susan Held Take 2 tabs po x day 1 then 1 tab po days 2-5 AVERY 10-20 mg Tab Generic drug:  amLODIPine-Olmesartan TAKE 1 TABLET BY MOUTH EVERY DAY  
  
 budesonide-formoterol 160-4.5 mcg/actuation Hfaa Commonly known as:  SYMBICORT  
INHALE 2 PUFFS BY MOUTH TWICE A DAY Cetirizine 10 mg Cap Take  by mouth.  
  
 cyanocobalamin 1,000 mcg tablet Take 1,000 mcg by mouth daily. cyclobenzaprine 10 mg tablet Commonly known as:  FLEXERIL  
1 tab po q 8 hours prn muscle spasm  
  
 fexofenadine 60 mg tablet Commonly known as:  Melvin [place] TAKE 1 TAB BY MOUTH TWO (2) TIMES A DAY. omeprazole 20 mg capsule Commonly known as:  PRILOSEC  
TAKE ONE CAPSULE BY MOUTH EVERY DAY  
  
 VITAMIN D3 2,000 unit Tab Generic drug:  cholecalciferol (vitamin D3) Take  by mouth. Prescriptions Sent to Pharmacy Refills  
 azithromycin (ZITHROMAX) 250 mg tablet 0 Sig: Take 2 tabs po x day 1 then 1 tab po days 2-5 Class: Normal  
 Pharmacy: [facility]/pharmacy Keila Roman, [facility] [address]  #: [phone] We Performed the Following REFERRAL TO GASTROENTEROLOGY [NPU36 Custom] Follow-up Instructions Return if symptoms worsen or fail to improve, for follow up. Referral Information Referral ID Referred By Referred To  
  
 [id number] Cornelio WARREN MD   
   [address] [place] [address] [address] [place] [address] Ave Phone: [phone] Fax: [phone] Visits Status Start Date End Date 1 New Request 2/19/18 2/19/19 If your referral has a status of pending review or denied, additional information will be sent to support the outcome of this decision. Introducing [facility]! [employer] introduces [employer] patient portal. Now you can access parts of your medical record, email your doctor's office, and request medication refills online. 1. In your internet browser, go to [email or web address] 2. Click on the First Time User? Click Here link in the Sign In box. You will see the New Member Sign Up page. 3. Enter your [employer] Access Code exactly as it appears below. You will not need to use this code after youve completed the sign-up process. If you do not sign up before the expiration date, you must request a new code. · [employer] Access Code: [email or web address] Expires: 4/15/2018 11:39 AM 
 
4. Enter the last four digits of your Social Security Number (xxxx) and Date of Birth (mm/dd/yyyy) as indicated and click Submit. You will be taken to the next sign-up page. 5. Create a [employer] ID. This will be your [employer] login ID and cannot be changed, so think of one that is secure and easy to remember. 6. Create a [employer] password. You can change your password at any time. 7. Enter your Password Reset Question and Answer. This can be used at a later time if you forget your password. 8. Enter your e-mail address. You will receive e-mail notification when new information is available in [address] 9. Click Sign Up. You can now view and download portions of your medical record. 10. Click the Download Summary menu link to download a portable copy of your medical information. If you have questions, please visit the Frequently Asked Questions section of the [employer] website. Remember, [employer] is NOT to be used for urgent needs. For medical emergencies, dial 911. Now available from your iPhone and Android! Please provide this summary of care documentation to your next provider. Your primary care clinician is listed as Jah Geronimo. If you have any questions after today's visit, please call [phone].

## 2018-02-19 NOTE — TELEPHONE ENCOUNTER
Left voicemail to scheduled sleep study.  No PA required for CPT code 98301 (PSG) per [facility] call ref# [id number]

## 2018-02-19 NOTE — TELEPHONE ENCOUNTER
HSAT inconclusive for MARIELOS X 2. Encounter Diagnoses   Name Primary?     MARIELOS (obstructive sleep apnea) Yes    Essential hypertension with goal blood pressure less than 140/90      Orders Placed This Encounter    POLYSOMNOGRAPHY 1 NIGHT     Standing Status:   Future     Standing Expiration Date:   8/20/2018     Order Specific Question:   Reason for Exam     Answer:   MARIELOS

## 2018-02-27 RX ORDER — AMLODIPINE AND OLMESARTAN MEDOXOMIL 10; 20 MG/1; MG/1
TABLET ORAL
Qty: 30 TAB | Refills: 5 | Status: SHIPPED | OUTPATIENT
Start: 2018-02-27 | End: 2018-09-16 | Stop reason: SDUPTHER

## 2018-03-14 ENCOUNTER — DOCUMENTATION ONLY (OUTPATIENT)
Dept: SLEEP MEDICINE | Age: 68
End: 2018-03-14

## 2018-09-17 RX ORDER — AMLODIPINE AND OLMESARTAN MEDOXOMIL 10; 20 MG/1; MG/1
TABLET ORAL
Qty: 30 TAB | Refills: 5 | Status: SHIPPED | OUTPATIENT
Start: 2018-09-17 | End: 2019-10-17

## 2018-09-21 ENCOUNTER — OFFICE VISIT (OUTPATIENT)
Dept: INTERNAL MEDICINE CLINIC | Facility: CLINIC | Age: 68
End: 2018-09-21

## 2018-09-21 ENCOUNTER — HOSPITAL ENCOUNTER (OUTPATIENT)
Dept: LAB | Age: 68
Discharge: HOME | End: 2018-09-21
Payer: COMMERCIAL

## 2018-09-21 VITALS
HEART RATE: 66 BPM | OXYGEN SATURATION: 98 % | BODY MASS INDEX: 33.43 KG/M2 | SYSTOLIC BLOOD PRESSURE: 142 MMHG | WEIGHT: 208 LBS | TEMPERATURE: 98.7 F | RESPIRATION RATE: 18 BRPM | HEIGHT: 66 IN | DIASTOLIC BLOOD PRESSURE: 81 MMHG

## 2018-09-21 DIAGNOSIS — J32.9 SINUSITIS, UNSPECIFIED CHRONICITY, UNSPECIFIED LOCATION: ICD-10-CM

## 2018-09-21 DIAGNOSIS — I10 ESSENTIAL HYPERTENSION WITH GOAL BLOOD PRESSURE LESS THAN 140/90: ICD-10-CM

## 2018-09-21 DIAGNOSIS — M88.9 PAGET'S BONE DISEASE: ICD-10-CM

## 2018-09-21 DIAGNOSIS — G89.29 CHRONIC LEFT-SIDED LOW BACK PAIN WITH LEFT-SIDED SCIATICA: ICD-10-CM

## 2018-09-21 DIAGNOSIS — M54.42 CHRONIC LEFT-SIDED LOW BACK PAIN WITH LEFT-SIDED SCIATICA: ICD-10-CM

## 2018-09-21 DIAGNOSIS — Z12.4 PAP SMEAR FOR CERVICAL CANCER SCREENING: ICD-10-CM

## 2018-09-21 DIAGNOSIS — M81.0 OSTEOPOROSIS, UNSPECIFIED OSTEOPOROSIS TYPE, UNSPECIFIED PATHOLOGICAL FRACTURE PRESENCE: ICD-10-CM

## 2018-09-21 DIAGNOSIS — E55.9 VITAMIN D DEFICIENCY: ICD-10-CM

## 2018-09-21 DIAGNOSIS — Z00.00 MEDICARE ANNUAL WELLNESS VISIT, INITIAL: Primary | ICD-10-CM

## 2018-09-21 LAB
BILIRUB UR QL STRIP: NEGATIVE
GLUCOSE UR-MCNC: NEGATIVE MG/DL
KETONES P FAST UR STRIP-MCNC: NEGATIVE MG/DL
PH UR STRIP: 5.5 [PH] (ref 4.6–8)
PROT UR QL STRIP: NEGATIVE
SP GR UR STRIP: 1.01 (ref 1–1.03)
UA UROBILINOGEN AMB POC: NORMAL (ref 0.2–1)
URINALYSIS CLARITY POC: CLEAR
URINALYSIS COLOR POC: YELLOW
URINE BLOOD POC: NEGATIVE
URINE LEUKOCYTES POC: NEGATIVE
URINE NITRITES POC: NEGATIVE

## 2018-09-21 PROCEDURE — 88175 CYTOPATH C/V AUTO FLUID REDO: CPT | Performed by: INTERNAL MEDICINE

## 2018-09-21 RX ORDER — CYCLOBENZAPRINE HCL 5 MG
5 TABLET ORAL
Qty: 30 TAB | Refills: 0 | Status: SHIPPED | OUTPATIENT
Start: 2018-09-21 | End: 2019-10-17

## 2018-09-21 RX ORDER — TERBINAFINE HYDROCHLORIDE 250 MG/1
250 TABLET ORAL DAILY
COMMUNITY
End: 2019-10-17

## 2018-09-21 RX ORDER — AZITHROMYCIN 250 MG/1
250 TABLET, FILM COATED ORAL SEE ADMIN INSTRUCTIONS
Qty: 6 TAB | Refills: 0 | Status: SHIPPED | OUTPATIENT
Start: 2018-09-21 | End: 2018-09-26

## 2018-09-21 RX ORDER — AMLODIPINE AND OLMESARTAN MEDOXOMIL 10; 20 MG/1; MG/1
TABLET ORAL
Qty: 30 TAB | Refills: 6 | Status: SHIPPED | OUTPATIENT
Start: 2018-09-21 | End: 2018-09-25

## 2018-09-21 NOTE — PROGRESS NOTES
Subjective:      Aurelia Lance is a 80 y.o. female who presents today for   Chief Complaint   Patient presents with    Complete Physical     Patient states she will be having eye surgery on 10/5/2018         Physical      Patient having some trouble with lower back, left sided back pain radiating down leg. Feels weak  She has seen ortho in the past Dr. Cam Becerril and had an epidural      Patient in today for complete physical and pap     Mammogram normal summer 2018 [facility]  Pap smear will be updated today  DEXA- will give order today  Colonoscopy done Aug 24  Dr. Jacki Basilio- treated for H. Pylori, given rx for simethicone  ophthalmologist - will have cataract removal on oct 5    Dr. Wells Read     Influenza vaccine patient will get at work  Pneumovax 12/2016  TDAP up todate within 10 year window  She states her zostavax is up to date     Constipation: Takes Fiber1 supplement daily for constipation. High fiber diet. Takes miralax daily      Generic form of leeann is causing swelling, patient would like to request prior auth for leeann        would like pap and full STD screen         Patient Active Problem List    Diagnosis Date Noted    Essential hypertension with goal blood pressure less than 140/90 05/27/2016    Allergic rhinitis 05/27/2016    Lipoma 11/23/2015     Current Outpatient Prescriptions   Medication Sig Dispense Refill    terbinafine HCl (LAMISIL) 250 mg tablet Take 250 mg by mouth daily.  amLODIPine-Olmesartan 10-20 mg tab TAKE 1 TABLET BY MOUTH EVERY DAY 30 Tab 5    omeprazole (PRILOSEC) 20 mg capsule TAKE ONE CAPSULE BY MOUTH EVERY DAY 30 Cap 1    fexofenadine (ALLEGRA) 60 mg tablet TAKE 1 TAB BY MOUTH TWO (2) TIMES A DAY. 60 Tab 0    albuterol (PROVENTIL HFA, VENTOLIN HFA, PROAIR HFA) 90 mcg/actuation inhaler Take 1 Puff by inhalation every four (4) hours as needed for Wheezing.  1 Inhaler 2    cyclobenzaprine (FLEXERIL) 10 mg tablet 1 tab po q 8 hours prn muscle spasm 30 Tab 1    azithromycin (ZITHROMAX) 250 mg tablet Take 2 tabs po x day 1 then 1 tab po days 2-5 6 Tab 0    cholecalciferol, vitamin D3, (VITAMIN D3) 2,000 unit tab Take  by mouth.  cyanocobalamin 1,000 mcg tablet Take 1,000 mcg by mouth daily. Allergies   Allergen Reactions    Penicillins Hives and Swelling     SWELLING OF TONGUE     Past Medical History:   Diagnosis Date    Adverse effect of anesthesia     WOKE UP 1X DURING PROCEDURE    Arthritis     Environmental allergies     GERD (gastroesophageal reflux disease)     Heart murmur     Hypertension     Lipoma 11/23/2015    Nausea & vomiting     ONLY NAUSEA    PUD (peptic ulcer disease)      Past Surgical History:   Procedure Laterality Date    HX GI      COLONOSCOPY    HX GYN      EXPLORATORY LAP    HX OTHER SURGICAL  1988    removed lipoma of head    HX OTHER SURGICAL  1/21/16     LOU for pinched nerve in back    HX OTHER SURGICAL  1/27/16    EXCISE RECURRENT LIPOMA POSTERIOR SCALP    HX RETINAL DETACHMENT REPAIR Left      Family History   Problem Relation Age of Onset    Hypertension Mother     Kidney Disease Mother      WAS ON DIALYSIS    Cancer Father      leukemia    Hypertension Sister     Other Brother      BRAIN ANEURYSM    Hypertension Brother     Anesth Problems Neg Hx      Social History   Substance Use Topics    Smoking status: Never Smoker    Smokeless tobacco: Never Used    Alcohol use 2.4 oz/week     4 Glasses of wine per week      Comment: social        Review of Systems    A comprehensive review of systems was negative except for that written in the HPI. Objective:     Visit Vitals    /81 (BP 1 Location: Left arm, BP Patient Position: Sitting)    Pulse 66    Temp 98.7 °F (37.1 °C) (Oral)    Resp 18    Ht 5' 6\" (1.676 m)    Wt 208 lb (94.3 kg)    LMP  (LMP Unknown)    SpO2 98%    BMI 33.57 kg/m2     General:  Alert, cooperative, no distress, appears stated age.    Head:  Normocephalic, without obvious abnormality, atraumatic. Eyes:  Conjunctivae/corneas clear. PERRL, EOMs intact. Fundi benign. Ears:  Normal TMs and external ear canals both ears. Nose: Nares normal. Septum midline. Mucosa normal. No drainage or sinus tenderness. Throat: Lips, mucosa, and tongue normal. Teeth and gums normal.   Neck: Supple, symmetrical, trachea midline, no adenopathy, thyroid: no enlargement/tenderness/nodules, no carotid bruit and no JVD. Back:   Symmetric, no curvature. ROM normal. No CVA tenderness. Lungs:   Clear to auscultation bilaterally. Chest wall:  No tenderness or deformity. Heart:  Regular rate and rhythm, S1, S2 normal, no murmur, click, rub or gallop. Abdomen:   Soft, non-tender. Bowel sounds normal. No masses,  No organomegaly. Extremities: Extremities normal, atraumatic, no cyanosis or edema. Pulses: 2+ and symmetric all extremities. Skin: Skin color, texture, turgor normal. No rashes or lesions. Lymph nodes: Cervical, supraclavicular, and axillary nodes normal.   Neurologic: CNII-XII intact. Normal strength, sensation and reflexes throughout. Assessment/Plan:       ICD-10-CM ICD-9-CM    1. Medicare annual wellness visit, initial G41.09 T56.0 METABOLIC PANEL, COMPREHENSIVE      LIPID PANEL      CBC WITH AUTOMATED DIFF      AMB POC URINALYSIS DIP STICK AUTO W/O MICRO      VITAMIN D, 25 HYDROXY      HEMOGLOBIN A1C WITH EAG      DEXA BONE DENSITY STUDY AXIAL      PAP IG, RFX HPV ASCU, 16&18,45([id number])      REFERRAL TO ORTHOPEDIC SURGERY      RPR      HIV 1/2 AG/AB, 4TH GENERATION,W RFLX CONFIRM      NUSWAB VAGINITIS PLUS   2. Chronic left-sided low back pain with left-sided sciatica M54.42 724.2 REFERRAL TO ORTHOPEDIC SURGERY    G89.29 724.3      338.29    3. Essential hypertension with goal blood pressure less than 140/90 I10 401.9    4. Sinusitis, unspecified chronicity, unspecified location J32.9 473.9    5.  Paget's bone disease M88.9 731.0 VITAMIN D, 25 HYDROXY      DEXA BONE DENSITY STUDY AXIAL 6. Pap smear for cervical cancer screening Z12.4 V76.2 PAP IG, RFX HPV ASCU, 16&18,45([id number])   7. Osteoporosis, unspecified osteoporosis type, unspecified pathological fracture presence M81.0 733.00 VITAMIN D, 25 HYDROXY      DEXA BONE DENSITY STUDY AXIAL   8. Vitamin D deficiency E55.9 268.9 VITAMIN D, 25 HYDROXY       Follow-up Disposition: Not on File   Advised her to call back or return to office if symptoms worsen/change/persist.  Discussed expected course/resolution/complications of diagnosis in detail with patient. Medication risks/benefits/costs/interactions/alternatives discussed with patient. She was given an after visit summary which includes diagnoses, current medications, & vitals. She expressed understanding with the diagnosis and plan. This is an Initial Medicare Annual Wellness Exam (AWV) (Performed 12 months after IPPE or effective date of Medicare Part B enrollment, Once in a lifetime)    I have reviewed the patient's medical history in detail and updated the computerized patient record. History     Past Medical History:   Diagnosis Date    Adverse effect of anesthesia     WOKE UP 1X DURING PROCEDURE    Arthritis     Environmental allergies     GERD (gastroesophageal reflux disease)     Heart murmur     Hypertension     Lipoma 11/23/2015    Nausea & vomiting     ONLY NAUSEA    PUD (peptic ulcer disease)       Past Surgical History:   Procedure Laterality Date    HX GI      COLONOSCOPY    HX GYN      EXPLORATORY LAP    HX OTHER SURGICAL  1988    removed lipoma of head    HX OTHER SURGICAL  1/21/16     LOU for pinched nerve in back    HX OTHER SURGICAL  1/27/16    EXCISE RECURRENT LIPOMA POSTERIOR SCALP    HX RETINAL DETACHMENT REPAIR Left      Current Outpatient Prescriptions   Medication Sig Dispense Refill    terbinafine HCl (LAMISIL) 250 mg tablet Take 250 mg by mouth daily.       azithromycin (ZITHROMAX) 250 mg tablet Take 1 Tab by mouth See Admin Instructions for 5 days. 6 Tab 0    amLODIPine-Olmesartan 10-20 mg tab TAKE 1 TABLET BY MOUTH EVERY DAY 30 Tab 5    omeprazole (PRILOSEC) 20 mg capsule TAKE ONE CAPSULE BY MOUTH EVERY DAY 30 Cap 1    fexofenadine (ALLEGRA) 60 mg tablet TAKE 1 TAB BY MOUTH TWO (2) TIMES A DAY. 60 Tab 0    albuterol (PROVENTIL HFA, VENTOLIN HFA, PROAIR HFA) 90 mcg/actuation inhaler Take 1 Puff by inhalation every four (4) hours as needed for Wheezing. 1 Inhaler 2    cyclobenzaprine (FLEXERIL) 10 mg tablet 1 tab po q 8 hours prn muscle spasm 30 Tab 1    azithromycin (ZITHROMAX) 250 mg tablet Take 2 tabs po x day 1 then 1 tab po days 2-5 6 Tab 0    cholecalciferol, vitamin D3, (VITAMIN D3) 2,000 unit tab Take  by mouth.  cyanocobalamin 1,000 mcg tablet Take 1,000 mcg by mouth daily. Allergies   Allergen Reactions    Penicillins Hives and Swelling     SWELLING OF TONGUE     Family History   Problem Relation Age of Onset    Hypertension Mother     Kidney Disease Mother      WAS ON DIALYSIS    Cancer Father      leukemia    Hypertension Sister     Other Brother      BRAIN ANEURYSM    Hypertension Brother     Anesth Problems Neg Hx      Social History   Substance Use Topics    Smoking status: Never Smoker    Smokeless tobacco: Never Used    Alcohol use 2.4 oz/week     4 Glasses of wine per week      Comment: social     Patient Active Problem List   Diagnosis Code    Lipoma D17.9    Essential hypertension with goal blood pressure less than 140/90 I10    Allergic rhinitis J30.9       Depression Risk Factor Screening:     PHQ over the last two weeks 1/31/2017   Little interest or pleasure in doing things Not at all   Feeling down, depressed, irritable, or hopeless Not at all   Total Score PHQ 2 0     Alcohol Risk Factor Screening:   Alcohol rare    Functional Ability and Level of Safety:     Hearing Loss  Hearing is good.   Hearing loss right ear    Activities of Daily Living  The home contains: no safety equipment  Patient does total self care    Fall Risk  Fall Risk Assessment, last 12 mths 9/21/2018   Able to walk? Yes   Fall in past 12 months? Yes   Fall with injury? Yes   Number of falls in past 12 months 4   Fall Risk Score 5       Abuse Screen  Not abused    Cognitive Screening   Evaluation of Cognitive Function:  Has your family/caregiver stated any concerns about your memory; normal    Patient Care Team   Patient Care Team:  Zuleyma An MD as PCP - General (Internal Medicine)  Jerry Booker MD as Physician (Sleep Medicine)    Assessment/Plan   Education and counseling provided:      Diagnoses and all orders for this visit:    1. Medicare annual wellness visit, initial  -     METABOLIC PANEL, COMPREHENSIVE  -     LIPID PANEL  -     CBC WITH AUTOMATED DIFF  -     AMB POC URINALYSIS DIP STICK AUTO W/O MICRO  -     VITAMIN D, 25 HYDROXY  -     HEMOGLOBIN A1C WITH EAG  -     DEXA BONE DENSITY STUDY AXIAL; Future  -     PAP IG, RFX HPV ASCU, 16&18,45([id number])  -     REFERRAL TO ORTHOPEDIC SURGERY  -     RPR  -     HIV 1/2 AG/AB, 4TH GENERATION,W RFLX CONFIRM  -     NUSWAB VAGINITIS PLUS    2. Chronic left-sided low back pain with left-sided sciatica  -     REFERRAL TO ORTHOPEDIC SURGERY    3. Essential hypertension with goal blood pressure less than 140/90    4. Sinusitis, unspecified chronicity, unspecified location    5. Paget's bone disease  -     VITAMIN D, 25 HYDROXY  -     DEXA BONE DENSITY STUDY AXIAL; Future    6. Pap smear for cervical cancer screening  -     PAP IG, RFX HPV ASCU, 16&18,45([id number])    7. Osteoporosis, unspecified osteoporosis type, unspecified pathological fracture presence  -     VITAMIN D, 25 HYDROXY  -     DEXA BONE DENSITY STUDY AXIAL; Future    8. Vitamin D deficiency  -     VITAMIN D, 25 HYDROXY    Other orders  -     azithromycin (ZITHROMAX) 250 mg tablet; Take 1 Tab by mouth See Admin Instructions for 5 days. -     cyclobenzaprine (FLEXERIL) 5 mg tablet;  Take 1 Tab by mouth three (3) times daily as needed for Muscle Spasm(s).

## 2018-09-21 NOTE — MR AVS SNAPSHOT
[address] [place] [address] 
[phone] Patient: Curtis Atkinson MRN: [id number] BAL:1/99/0513 Visit Information Date & Time Provider Department Dept. Phone Encounter #  
 9/21/2018  2:15 PM Danny Haynes, [address] [facility] Internal Medicine [phone] [id number] Follow-up Instructions Return in about 2 weeks (around 10/5/2018) for follow up review results. Upcoming Health Maintenance Date Due Hepatitis C Screening 1950 DTaP/Tdap/Td series (1 - Tdap) 6/19/1972 FOBT Q 1 YEAR AGE 50-75 6/19/2001 ZOSTER VACCINE AGE 60> 4/19/2011 GLAUCOMA SCREENING Q2Y 6/19/2016 Bone Densitometry (Dexa) Screening 6/19/2016 Pneumococcal 65+ Low/Medium Risk (2 of 2 - PCV13) 12/7/2017 Influenza Age 5 to Adult 8/1/2018 BREAST CANCER SCRN MAMMOGRAM 11/16/2018 Allergies as of 9/21/2018  Review Complete On: 9/21/2018 By: Thedore Boas, LPN Severity Noted Reaction Type Reactions Penicillins  01/13/2016    Hives, Swelling SWELLING OF TONGUE Current Immunizations  Reviewed on 12/7/2016 Name Date Influenza Vaccine Gearld Roll) 10/21/2015 Pneumococcal Polysaccharide (PPSV-23) 12/7/2016 Not reviewed this visit You Were Diagnosed With   
  
 Codes Comments Medicare annual wellness visit, initial    -  Primary ICD-10-CM: Z00.00 ICD-9-CM: V70.0 Chronic left-sided low back pain with left-sided sciatica     ICD-10-CM: M54.42, G89.29 ICD-9-CM: 724.2, 724.3, 338.29 Essential hypertension with goal blood pressure less than 140/90     ICD-10-CM: I10 
ICD-9-CM: 401.9 Sinusitis, unspecified chronicity, unspecified location     ICD-10-CM: J32.9 ICD-9-CM: 473.9 Paget's bone disease     ICD-10-CM: M88.9 ICD-9-CM: 731.0 Pap smear for cervical cancer screening     ICD-10-CM: Z12.4 ICD-9-CM: V76.2  Osteoporosis, unspecified osteoporosis type, unspecified pathological fracture presence     ICD-10-CM: M81.0 ICD-9-CM: 733.00 Vitamin D deficiency     ICD-10-CM: E55.9 ICD-9-CM: 268.9 Vitals BP Pulse Temp Resp Height(growth percentile) Weight(growth percentile) 142/81 (BP 1 Location: Left arm, BP Patient Position: Sitting) 66 98.7 °F (37.1 °C) (Oral) 18 5' 6\" (1.676 m) 208 lb (94.3 kg) LMP SpO2 BMI OB Status Smoking Status (LMP Unknown) 98% 33.57 kg/m2 Postmenopausal Never Smoker Vitals History BMI and BSA Data Body Mass Index Body Surface Area  
 33.57 kg/m 2 2.1 m 2 Preferred Pharmacy Pharmacy Name Phone [id number] [place], [place]  Aaron Jorgensen [address] [phone] Your Updated Medication List  
  
   
This list is accurate as of 9/21/18  3:07 PM.  Always use your most recent med list.  
  
  
  
  
 albuterol 90 mcg/actuation inhaler Commonly known as:  PROVENTIL HFA, VENTOLIN HFA, PROAIR HFA Take 1 Puff by inhalation every four (4) hours as needed for Wheezing. amLODIPine-Olmesartan 10-20 mg Tab TAKE 1 TABLET BY MOUTH EVERY DAY  
  
 * azithromycin 250 mg tablet Commonly known as:  Madeleine Leader Take 2 tabs po x day 1 then 1 tab po days 2-5 * azithromycin 250 mg tablet Commonly known as:  Madeleine Leader Take 1 Tab by mouth See Admin Instructions for 5 days. cyanocobalamin 1,000 mcg tablet Take 1,000 mcg by mouth daily. cyclobenzaprine 10 mg tablet Commonly known as:  FLEXERIL  
1 tab po q 8 hours prn muscle spasm  
  
 fexofenadine 60 mg tablet Commonly known as:  [place] Armor TAKE 1 TAB BY MOUTH TWO (2) TIMES A DAY. LamISIL 250 mg tablet Generic drug:  terbinafine HCl Take 250 mg by mouth daily. omeprazole 20 mg capsule Commonly known as:  PRILOSEC  
TAKE ONE CAPSULE BY MOUTH EVERY DAY  
  
 VITAMIN D3 2,000 unit Tab Generic drug:  cholecalciferol (vitamin D3) Take  by mouth. * Notice: This list has 2 medication(s) that are the same as other medications prescribed for you. Read the directions carefully, and ask your doctor or other care provider to review them with you. Prescriptions Sent to Pharmacy Refills  
 azithromycin (ZITHROMAX) 250 mg tablet 0 Sig: Take 1 Tab by mouth See Admin Instructions for 5 days. Class: Normal  
 [facility] [address], [facility] #: [phone] Route: Oral  
  
We Performed the Following AMB POC URINALYSIS DIP STICK AUTO W/O MICRO [04420 CPT(R)] CBC WITH AUTOMATED DIFF [72263 CPT(R)] HEMOGLOBIN A1C WITH EAG [70114 CPT(R)] HIV 1/2 AG/AB, 4TH GENERATION,W RFLX CONFIRM [id number] CPT(R)] LIPID PANEL [42494 CPT(R)] METABOLIC PANEL, COMPREHENSIVE [13901 CPT(R)] [address] [id number] Custom] PAP IG, [address] HPV ASCU, [id number]) [[id number] Custom] REFERRAL TO ORTHOPEDIC SURGERY [REF62 Custom] Comments:  
 Woodrow Fleming Friend, [address] [facility] Phone: [phone] Fax: [phone] RPR [82288 CPT(R)] VITAMIN D, 25 HYDROXY [id number] CPT(R)] Follow-up Instructions Return in about 2 weeks (around 10/5/2018) for follow up review results. To-Do List   
 09/28/2018 Imaging:  DEXA BONE DENSITY STUDY AXIAL Referral Information Referral ID Referred By Referred To  
  
 [id number] Alisia WARREN MD   
   [address] [facility] [address] [place] [address] Phone: [phone] Fax: [phone] Visits Status Start Date End Date 1 New Request 9/21/18 9/21/19 If your referral has a status of pending review or denied, additional information will be sent to support the outcome of this decision. Patient Instructions Preventing Falls: Care Instructions Your Care Instructions Getting around your home safely can be a challenge if you have injuries or health problems that make it easy for you to fall. Loose rugs and furniture in walkways are among the dangers for many older people who have problems walking or who have poor eyesight. People who have conditions such as arthritis, osteoporosis, or dementia also have to be careful not to fall. You can make your home safer with a few simple measures. Follow-up care is a key part of your treatment and safety. Be sure to make and go to all appointments, and call your doctor if you are having problems. It's also a good idea to know your test results and keep a list of the medicines you take. How can you care for yourself at home? Taking care of yourself · You may get dizzy if you do not drink enough water. To prevent dehydration, drink plenty of fluids, enough so that your urine is light yellow or clear like water. Choose water and other caffeine-free clear liquids. If you have kidney, heart, or liver disease and have to limit fluids, talk with your doctor before you increase the amount of fluids you drink. · Exercise regularly to improve your strength, muscle tone, and balance. Walk if you can. Swimming may be a good choice if you cannot walk easily. · Have your vision and hearing checked each year or any time you notice a change. If you have trouble seeing and hearing, you might not be able to avoid objects and could lose your balance. · Know the side effects of the medicines you take. Ask your doctor or pharmacist whether the medicines you take can affect your balance. Sleeping pills or sedatives can affect your balance. · Limit the amount of alcohol you drink. Alcohol can impair your balance and other senses. · Ask your doctor whether calluses or corns on your feet need to be removed. If you wear loose-fitting shoes because of calluses or corns, you can lose your balance and fall. · Talk to your doctor if you have numbness in your feet. Preventing falls at home · Remove raised doorway thresholds, throw rugs, and clutter. Repair loose carpet or raised areas in the floor. · Move furniture and electrical cords to keep them out of walking paths. · Use nonskid floor wax, and wipe up spills right away, especially on ceramic tile floors. · If you use a walker or cane, put rubber tips on it. If you use crutches, clean the bottoms of them regularly with an abrasive pad, such as steel wool. · Keep your house well lit, especially Heena Drain, and outside walkways. Use night-lights in areas such as hallways and bathrooms. Add extra light switches or use remote switches (such as switches that go on or off when you clap your hands) to make it easier to turn lights on if you have to get up during the night. · Install sturdy handrails on stairways. · Move items in your cabinets so that the things you use a lot are on the lower shelves (about waist level). · Keep a cordless phone and a flashlight with new batteries by your bed. If possible, put a phone in each of the main rooms of your house, or carry a cell phone in case you fall and cannot reach a phone. Or, you can wear a device around your neck or wrist. You push a button that sends a signal for help. · Wear low-heeled shoes that fit well and give your feet good support. Use footwear with nonskid soles. Check the heels and soles of your shoes for wear. Repair or replace worn heels or soles. · Do not wear socks without shoes on wood floors. · Walk on the grass when the sidewalks are slippery. If you live in an area that gets snow and ice in the winter, sprinkle salt on slippery steps and sidewalks. Preventing falls in the bath · Install grab bars and nonskid mats inside and outside your shower or tub and near the toilet and sinks. · Use shower chairs and bath benches. · Use a hand-held shower head that will allow you to sit while showering. · Get into a tub or shower by putting the weaker leg in first. Get out of a tub or shower with your strong side first. 
· Repair loose toilet seats and consider installing a raised toilet seat to make getting on and off the toilet easier. · Keep your bathroom door unlocked while you are in the shower. Where can you learn more? Go to http://sandoval-renzo.info/. Enter [phone] in the search box to learn more about \"Preventing Falls: Care Instructions. \" Current as of: May 12, 2017 Content Version: 11.7 © 2297-7940 [employer]. Care instructions adapted under license by [employer] (which disclaims liability or warranty for this information). If you have questions about a medical condition or this instruction, always ask your healthcare professional. [address] any warranty or liability for your use of this information. Medicare Wellness Visit, Female The best way to live healthy is to have a lifestyle where you eat a well-balanced diet, exercise regularly, limit alcohol use, and quit all forms of tobacco/nicotine, if applicable. Regular preventive services are another way to keep healthy. Preventive services (vaccines, screening tests, monitoring & exams) can help personalize your care plan, which helps you manage your own care. Screening tests can find health problems at the earliest stages, when they are easiest to treat. [facility] follows the current, evidence-based guidelines published by the [facility] Randal Contreras (USPSTF) when recommending preventive services for our patients. Because we follow these guidelines, sometimes recommendations change over time as research supports it. (For example, mammograms used to be recommended annually.  Even though Medicare will still pay for an annual mammogram, the newer guidelines recommend a mammogram every two years for women of average risk.) Of course, you and your doctor may decide to screen more often for some diseases, based on your risk and your health status. Preventive services for you include: - Medicare offers their members a free annual wellness visit, which is time for you and your primary care provider to discuss and plan for your preventive service needs. Take advantage of this benefit every year! 
-All adults over the age of 72 should receive the recommended pneumonia vaccines. Current USPSTF guidelines recommend a series of two vaccines for the best pneumonia protection.  
-All adults should have a flu vaccine yearly and a tetanus vaccine every 10 years. All adults age 61 and older should receive a shingles vaccine once in their lifetime.   
-A bone mass density test is recommended when a woman turns 65 to screen for osteoporosis. This test is only recommended one time, as a screening. Some providers will use this same test as a disease monitoring tool if you already have osteoporosis. -All adults age 38-68 who are overweight should have a diabetes screening test once every three years.  
-Other screening tests and preventive services for persons with diabetes include: an eye exam to screen for diabetic retinopathy, a kidney function test, a foot exam, and stricter control over your cholesterol.  
-Cardiovascular screening for adults with routine risk involves an electrocardiogram (ECG) at intervals determined by your doctor.  
-Colorectal cancer screenings should be done for adults age 54-65 with no increased risk factors for colorectal cancer. There are a number of acceptable methods of screening for this type of cancer. Each test has its own benefits and drawbacks. Discuss with your doctor what is most appropriate for you during your annual wellness visit.  The different tests include: colonoscopy (considered the best screening method), a fecal occult blood test, a fecal DNA test, and sigmoidoscopy. -Breast cancer screenings are recommended every other year for women of normal risk, age 54-69. 
-Cervical cancer screenings for women over age 72 are only recommended with certain risk factors.  
-All adults born between [facility] should be screened once for Hepatitis C. Here is a list of your current Health Maintenance items (your personalized list of preventive services) with a due date: 
Health Maintenance Due Topic Date Due  
 Hepatitis C Test  1950  
 DTaP/Tdap/Td  (1 - Tdap) 06/19/1972  Stool testing for trace blood  06/19/2001  Shingles Vaccine  04/19/2011  Glaucoma Screening   06/19/2016  Bone Mineral Density   06/19/2016  Pneumococcal Vaccine (2 of 2 - PCV13) 12/07/2017  Flu Vaccine  08/01/2018  Breast Cancer Screening  11/16/2018 Introducing [facility]! [facility] introduces Un[employer]quare patient portal. Now you can access parts of your medical record, email your doctor's office, and request medication refills online. 1. In your internet browser, go to [email or web address] 2. Click on the First Time User? Click Here link in the Sign In box. You will see the New Member Sign Up page. 3. Enter your Unifysquare Access Code exactly as it appears below. You will not need to use this code after youve completed the sign-up process. If you do not sign up before the expiration date, you must request a new code. · Unif[employer] Access Code: [email or web address] Expires: 12/20/2018  3:07 PM 
 
4. Enter the last four digits of your Social Security Number (xxxx) and Date of Birth (mm/dd/yyyy) as indicated and click Submit. You will be taken to the next sign-up page. 5. Create a "[employer]t ID. This will be your Unifysquare login ID and cannot be changed, so think of one that is secure and easy to remember. 6. Create a "[employer]t password. You can change your password at any time. 7. Enter your Password Reset Question and Answer. This can be used at a later time if you forget your password. 8. Enter your e-mail address. You will receive e-mail notification when new information is available in [address] 9. Click Sign Up. You can now view and download portions of your medical record. 10. Click the Download Summary menu link to download a portable copy of your medical information. If you have questions, please visit the Frequently Asked Questions section of the LoopIt website. Remember, LoopIt is NOT to be used for urgent needs. For medical emergencies, dial 911. Now available from your iPhone and Android! Please provide this summary of care documentation to your next provider. Your primary care clinician is listed as Darrius Burdick. If you have any questions after today's visit, please call [phone].

## 2018-09-21 NOTE — PROGRESS NOTES
Chief Complaint   Patient presents with    Complete Physical     Patient states she will be having eye surgery on 10/5/2018     1. Have you been to the ER, urgent care clinic since your last visit? Hospitalized since your last visit? No    2. Have you seen or consulted any other health care providers outside of the [address] [place] [address] since your last visit? Include any pap smears or colon screening. No    Colonoscopy and EGD done 8/24/2018 per patient.  Reports on the

## 2018-09-21 NOTE — PATIENT INSTRUCTIONS
Preventing Falls: Care Instructions  Your Care Instructions    Getting around your home safely can be a challenge if you have injuries or health problems that make it easy for you to fall. Loose rugs and furniture in walkways are among the dangers for many older people who have problems walking or who have poor eyesight. People who have conditions such as arthritis, osteoporosis, or dementia also have to be careful not to fall. You can make your home safer with a few simple measures. Follow-up care is a key part of your treatment and safety. Be sure to make and go to all appointments, and call your doctor if you are having problems. It's also a good idea to know your test results and keep a list of the medicines you take. How can you care for yourself at home? Taking care of yourself  · You may get dizzy if you do not drink enough water. To prevent dehydration, drink plenty of fluids, enough so that your urine is light yellow or clear like water. Choose water and other caffeine-free clear liquids. If you have kidney, heart, or liver disease and have to limit fluids, talk with your doctor before you increase the amount of fluids you drink. · Exercise regularly to improve your strength, muscle tone, and balance. Walk if you can. Swimming may be a good choice if you cannot walk easily. · Have your vision and hearing checked each year or any time you notice a change. If you have trouble seeing and hearing, you might not be able to avoid objects and could lose your balance. · Know the side effects of the medicines you take. Ask your doctor or pharmacist whether the medicines you take can affect your balance. Sleeping pills or sedatives can affect your balance. · Limit the amount of alcohol you drink. Alcohol can impair your balance and other senses. · Ask your doctor whether calluses or corns on your feet need to be removed.  If you wear loose-fitting shoes because of calluses or corns, you can lose your balance and fall. · Talk to your doctor if you have numbness in your feet. Preventing falls at home  · Remove raised doorway thresholds, throw rugs, and clutter. Repair loose carpet or raised areas in the floor. · Move furniture and electrical cords to keep them out of walking paths. · Use nonskid floor wax, and wipe up spills right away, especially on ceramic tile floors. · If you use a walker or cane, put rubber tips on it. If you use crutches, clean the bottoms of them regularly with an abrasive pad, such as steel wool. · Keep your house well lit, especially [place], and outside walkways. Use night-lights in areas such as hallways and bathrooms. Add extra light switches or use remote switches (such as switches that go on or off when you clap your hands) to make it easier to turn lights on if you have to get up during the night. · Install sturdy handrails on stairways. · Move items in your cabinets so that the things you use a lot are on the lower shelves (about waist level). · Keep a cordless phone and a flashlight with new batteries by your bed. If possible, put a phone in each of the main rooms of your house, or carry a cell phone in case you fall and cannot reach a phone. Or, you can wear a device around your neck or wrist. You push a button that sends a signal for help. · Wear low-heeled shoes that fit well and give your feet good support. Use footwear with nonskid soles. Check the heels and soles of your shoes for wear. Repair or replace worn heels or soles. · Do not wear socks without shoes on wood floors. · Walk on the grass when the sidewalks are slippery. If you live in an area that gets snow and ice in the winter, sprinkle salt on slippery steps and sidewalks. Preventing falls in the bath  · Install grab bars and nonskid mats inside and outside your shower or tub and near the toilet and sinks. · Use shower chairs and bath benches.   · Use a hand-held shower head that will allow you to sit while showering. · Get into a tub or shower by putting the weaker leg in first. Get out of a tub or shower with your strong side first.  · Repair loose toilet seats and consider installing a raised toilet seat to make getting on and off the toilet easier. · Keep your bathroom door unlocked while you are in the shower. Where can you learn more? Go to http://sandoval-renzo.info/. Enter [id number] [id number] in the search box to learn more about \"Preventing Falls: Care Instructions. \"  Current as of: May 12, 2017  Content Version: 11.7  © 2342-6588 [employer]. Care instructions adapted under license by [employer] (which disclaims liability or warranty for this information). If you have questions about a medical condition or this instruction, always ask your healthcare professional. [place] [address] any warranty or liability for your use of this information. Medicare Wellness Visit, Female     The best way to live healthy is to have a lifestyle where you eat a well-balanced diet, exercise regularly, limit alcohol use, and quit all forms of tobacco/nicotine, if applicable. Regular preventive services are another way to keep healthy. Preventive services (vaccines, screening tests, monitoring & exams) can help personalize your care plan, which helps you manage your own care. Screening tests can find health problems at the earliest stages, when they are easiest to treat. [facility] follows the current, evidence-based guidelines published by the [place] (USPSTF) when recommending preventive services for our patients. Because we follow these guidelines, sometimes recommendations change over time as research supports it. (For example, mammograms used to be recommended annually.  Even though Medicare will still pay for an annual mammogram, the newer guidelines recommend a mammogram every two years for women of average risk.)  Of course, you and your doctor may decide to screen more often for some diseases, based on your risk and your health status. Preventive services for you include:  - Medicare offers their members a free annual wellness visit, which is time for you and your primary care provider to discuss and plan for your preventive service needs. Take advantage of this benefit every year!  -All adults over the age of 72 should receive the recommended pneumonia vaccines. Current USPSTF guidelines recommend a series of two vaccines for the best pneumonia protection.   -All adults should have a flu vaccine yearly and a tetanus vaccine every 10 years. All adults age 61 and older should receive a shingles vaccine once in their lifetime.    -A bone mass density test is recommended when a woman turns 65 to screen for osteoporosis. This test is only recommended one time, as a screening. Some providers will use this same test as a disease monitoring tool if you already have osteoporosis. -All adults age 38-68 who are overweight should have a diabetes screening test once every three years.   -Other screening tests and preventive services for persons with diabetes include: an eye exam to screen for diabetic retinopathy, a kidney function test, a foot exam, and stricter control over your cholesterol.   -Cardiovascular screening for adults with routine risk involves an electrocardiogram (ECG) at intervals determined by your doctor.   -Colorectal cancer screenings should be done for adults age 54-65 with no increased risk factors for colorectal cancer. There are a number of acceptable methods of screening for this type of cancer. Each test has its own benefits and drawbacks. Discuss with your doctor what is most appropriate for you during your annual wellness visit. The different tests include: colonoscopy (considered the best screening method), a fecal occult blood test, a fecal DNA test, and sigmoidoscopy.   -Breast cancer screenings are recommended every other year for women of normal risk, age 54-69.  -Cervical cancer screenings for women over age 72 are only recommended with certain risk factors.   -All adults born between [facility] should be screened once for Hepatitis C.      Here is a list of your current Health Maintenance items (your personalized list of preventive services) with a due date:  Health Maintenance Due   Topic Date Due    Hepatitis C Test  1950    DTaP/Tdap/Td  (1 - Tdap) 06/19/1972    Stool testing for trace blood  06/19/2001    Shingles Vaccine  04/19/2011    Glaucoma Screening   06/19/2016    Bone Mineral Density   06/19/2016    Pneumococcal Vaccine (2 of 2 - PCV13) 12/07/2017    Flu Vaccine  08/01/2018    Breast Cancer Screening  11/16/2018

## 2018-09-25 LAB
25(OH)D3+25(OH)D2 SERPL-MCNC: 20.9 NG/ML (ref 30–100)
A VAGINAE DNA VAG QL NAA+PROBE: NORMAL SCORE
ALBUMIN SERPL-MCNC: 4.5 G/DL (ref 3.6–4.8)
ALBUMIN/GLOB SERPL: 1.5 {RATIO} (ref 1.2–2.2)
ALP SERPL-CCNC: 170 IU/L (ref 39–117)
ALT SERPL-CCNC: 16 IU/L (ref 0–32)
AST SERPL-CCNC: 22 IU/L (ref 0–40)
BASOPHILS # BLD AUTO: 0.1 X10E3/UL (ref 0–0.2)
BASOPHILS NFR BLD AUTO: 1 %
BILIRUB SERPL-MCNC: 0.3 MG/DL (ref 0–1.2)
BUN SERPL-MCNC: 13 MG/DL (ref 8–27)
BUN/CREAT SERPL: 17 (ref 12–28)
BVAB2 DNA VAG QL NAA+PROBE: NORMAL SCORE
C ALBICANS DNA VAG QL NAA+PROBE: NEGATIVE
C GLABRATA DNA VAG QL NAA+PROBE: NEGATIVE
C TRACH RRNA SPEC QL NAA+PROBE: NEGATIVE
CALCIUM SERPL-MCNC: 9.9 MG/DL (ref 8.7–10.3)
CHLORIDE SERPL-SCNC: 102 MMOL/L (ref 96–106)
CHOLEST SERPL-MCNC: 219 MG/DL (ref 100–199)
CO2 SERPL-SCNC: 24 MMOL/L (ref 20–29)
CREAT SERPL-MCNC: 0.75 MG/DL (ref 0.57–1)
EOSINOPHIL # BLD AUTO: 0.2 X10E3/UL (ref 0–0.4)
EOSINOPHIL NFR BLD AUTO: 4 %
ERYTHROCYTE [DISTWIDTH] IN BLOOD BY AUTOMATED COUNT: 15.3 % (ref 12.3–15.4)
EST. AVERAGE GLUCOSE BLD GHB EST-MCNC: 123 MG/DL
GLOBULIN SER CALC-MCNC: 3 G/DL (ref 1.5–4.5)
GLUCOSE SERPL-MCNC: 88 MG/DL (ref 65–99)
HBA1C MFR BLD: 5.9 % (ref 4.8–5.6)
HCT VFR BLD AUTO: 35.8 % (ref 34–46.6)
HDLC SERPL-MCNC: 70 MG/DL
HGB BLD-MCNC: 11.8 G/DL (ref 11.1–15.9)
HIV 1+2 AB+HIV1 P24 AG SERPL QL IA: NON REACTIVE
IMM GRANULOCYTES # BLD: 0 X10E3/UL (ref 0–0.1)
IMM GRANULOCYTES NFR BLD: 0 %
LDLC SERPL CALC-MCNC: 130 MG/DL (ref 0–99)
LYMPHOCYTES # BLD AUTO: 2.1 X10E3/UL (ref 0.7–3.1)
LYMPHOCYTES NFR BLD AUTO: 49 %
MCH RBC QN AUTO: 28.2 PG (ref 26.6–33)
MCHC RBC AUTO-ENTMCNC: 33 G/DL (ref 31.5–35.7)
MCV RBC AUTO: 86 FL (ref 79–97)
MEGA1 DNA VAG QL NAA+PROBE: NORMAL SCORE
MONOCYTES # BLD AUTO: 0.4 X10E3/UL (ref 0.1–0.9)
MONOCYTES NFR BLD AUTO: 10 %
N GONORRHOEA RRNA SPEC QL NAA+PROBE: NEGATIVE
NEUTROPHILS # BLD AUTO: 1.5 X10E3/UL (ref 1.4–7)
NEUTROPHILS NFR BLD AUTO: 36 %
PLATELET # BLD AUTO: 309 X10E3/UL (ref 150–379)
POTASSIUM SERPL-SCNC: 4.1 MMOL/L (ref 3.5–5.2)
PROT SERPL-MCNC: 7.5 G/DL (ref 6–8.5)
RBC # BLD AUTO: 4.18 X10E6/UL (ref 3.77–5.28)
RPR SER QL: NON REACTIVE
SODIUM SERPL-SCNC: 141 MMOL/L (ref 134–144)
T VAGINALIS RRNA SPEC QL NAA+PROBE: NEGATIVE
TRIGL SERPL-MCNC: 93 MG/DL (ref 0–149)
VLDLC SERPL CALC-MCNC: 19 MG/DL (ref 5–40)
WBC # BLD AUTO: 4.3 X10E3/UL (ref 3.4–10.8)

## 2018-10-19 ENCOUNTER — OFFICE VISIT (OUTPATIENT)
Dept: INTERNAL MEDICINE CLINIC | Facility: CLINIC | Age: 68
End: 2018-10-19

## 2018-10-19 VITALS
RESPIRATION RATE: 18 BRPM | HEART RATE: 66 BPM | WEIGHT: 212 LBS | BODY MASS INDEX: 34.07 KG/M2 | TEMPERATURE: 98.5 F | SYSTOLIC BLOOD PRESSURE: 132 MMHG | HEIGHT: 66 IN | DIASTOLIC BLOOD PRESSURE: 80 MMHG

## 2018-10-19 DIAGNOSIS — R74.8 ELEVATED ALKALINE PHOSPHATASE LEVEL: Primary | ICD-10-CM

## 2018-10-19 DIAGNOSIS — R73.03 PREDIABETES: ICD-10-CM

## 2018-10-19 DIAGNOSIS — E55.9 VITAMIN D DEFICIENCY: ICD-10-CM

## 2018-10-19 DIAGNOSIS — E78.49 OTHER HYPERLIPIDEMIA: ICD-10-CM

## 2018-10-19 NOTE — PROGRESS NOTES
Subjective:      Jagdish Mckeon is a 80 y.o. female who presents today for   Chief Complaint   Patient presents with    Results   prediabetic    Vit D deficient    Hyperlipidemia    Elevated alkaline phosphatase      Patient Active Problem List    Diagnosis Date Noted    Essential hypertension with goal blood pressure less than 140/90 05/27/2016    Allergic rhinitis 05/27/2016    Lipoma 11/23/2015     Current Outpatient Medications   Medication Sig Dispense Refill    terbinafine HCl (LAMISIL) 250 mg tablet Take 250 mg by mouth daily.  cyclobenzaprine (FLEXERIL) 5 mg tablet Take 1 Tab by mouth three (3) times daily as needed for Muscle Spasm(s). 30 Tab 0    amLODIPine-Olmesartan 10-20 mg tab TAKE 1 TABLET BY MOUTH EVERY DAY 30 Tab 5    omeprazole (PRILOSEC) 20 mg capsule TAKE ONE CAPSULE BY MOUTH EVERY DAY 30 Cap 1    fexofenadine (ALLEGRA) 60 mg tablet TAKE 1 TAB BY MOUTH TWO (2) TIMES A DAY. 60 Tab 0    cholecalciferol, vitamin D3, (VITAMIN D3) 2,000 unit tab Take  by mouth.  albuterol (PROVENTIL HFA, VENTOLIN HFA, PROAIR HFA) 90 mcg/actuation inhaler Take 1 Puff by inhalation every four (4) hours as needed for Wheezing. 1 Inhaler 2    cyanocobalamin 1,000 mcg tablet Take 1,000 mcg by mouth daily.        Allergies   Allergen Reactions    Penicillins Hives and Swelling     SWELLING OF TONGUE     Past Medical History:   Diagnosis Date    Adverse effect of anesthesia     WOKE UP 1X DURING PROCEDURE    Arthritis     Environmental allergies     GERD (gastroesophageal reflux disease)     Heart murmur     Hypertension     Lipoma 11/23/2015    Nausea & vomiting     ONLY NAUSEA    PUD (peptic ulcer disease)      Past Surgical History:   Procedure Laterality Date    HX GI      COLONOSCOPY    HX GYN      EXPLORATORY LAP    HX OTHER SURGICAL  1988    removed lipoma of head    HX OTHER SURGICAL  1/21/16     LOU for pinched nerve in back    HX OTHER SURGICAL  1/27/16    EXCISE RECURRENT LIPOMA POSTERIOR SCALP    HX RETINAL DETACHMENT REPAIR Left      Family History   Problem Relation Age of Onset    Hypertension Mother     Kidney Disease Mother         WAS ON DIALYSIS    Cancer Father         leukemia    Hypertension Sister     Other Brother         BRAIN ANEURYSM    Hypertension Brother     Anesth Problems Neg Hx      Social History     Tobacco Use    Smoking status: Never Smoker    Smokeless tobacco: Never Used   Substance Use Topics    Alcohol use: Yes     Alcohol/week: 2.4 oz     Types: 4 Glasses of wine per week     Comment: social        Review of Systems    A comprehensive review of systems was negative except for that written in the HPI. Objective:     Visit Vitals  /80   Pulse 66   Temp 98.5 °F (36.9 °C) (Oral)   Resp 18   Ht 5' 6\" (1.676 m)   Wt 212 lb (96.2 kg)   LMP  (LMP Unknown)   BMI 34.22 kg/m²     General:  Alert, cooperative, no distress, appears stated age. Head:  Normocephalic, without obvious abnormality, atraumatic. Eyes:  Conjunctivae/corneas clear. PERRL, EOMs intact. Fundi benign. Ears:  Normal TMs and external ear canals both ears. Nose: Nares normal. Septum midline. Mucosa normal. No drainage or sinus tenderness. Throat: Lips, mucosa, and tongue normal. Teeth and gums normal.   Neck: Supple, symmetrical, trachea midline, no adenopathy, thyroid: no enlargement/tenderness/nodules, no carotid bruit and no JVD. Back:   Symmetric, no curvature. ROM normal. No CVA tenderness. Lungs:   Clear to auscultation bilaterally. Chest wall:  No tenderness or deformity. Heart:  Regular rate and rhythm, S1, S2 normal, no murmur, click, rub or gallop. Assessment/Plan:       ICD-10-CM ICD-9-CM    1. Elevated alkaline phosphatase level R74.8 790.5 Chronic, stable   2. Vitamin D deficiency E55.9 268.9 Resume supplement   3. Prediabetes R73.03 790.29 Patient will work on dietary changes and weight loss  Will recheck A1c in 3 months   4.  Other hyperlipidemia E78.49 272.4 Monitor  Has shown some improvement compared to last check       Follow-up Disposition: Not on File   Advised her to call back or return to office if symptoms worsen/change/persist.  Discussed expected course/resolution/complications of diagnosis in detail with patient. Medication risks/benefits/costs/interactions/alternatives discussed with patient. She was given an after visit summary which includes diagnoses, current medications, & vitals. She expressed understanding with the diagnosis and plan.

## 2018-10-19 NOTE — PROGRESS NOTES
Chief Complaint   Patient presents with    Results     1. Have you been to the ER, urgent care clinic since your last visit? Hospitalized since your last visit? No    2. Have you seen or consulted any other health care providers outside of the [facility] since your last visit? Include any pap smears or colon screening.  No

## 2018-11-26 ENCOUNTER — HOSPITAL ENCOUNTER (OUTPATIENT)
Dept: MAMMOGRAPHY | Age: 68
Discharge: HOME | End: 2018-11-26
Attending: INTERNAL MEDICINE
Payer: MEDICARE

## 2018-11-26 DIAGNOSIS — M81.0 OSTEOPOROSIS, UNSPECIFIED OSTEOPOROSIS TYPE, UNSPECIFIED PATHOLOGICAL FRACTURE PRESENCE: ICD-10-CM

## 2018-11-26 DIAGNOSIS — M88.9 PAGET'S BONE DISEASE: ICD-10-CM

## 2018-11-26 DIAGNOSIS — Z00.00 MEDICARE ANNUAL WELLNESS VISIT, INITIAL: ICD-10-CM

## 2018-11-26 PROCEDURE — 77080 DXA BONE DENSITY AXIAL: CPT

## 2018-11-30 RX ORDER — FEXOFENADINE HCL 60 MG
TABLET ORAL
Qty: 60 TAB | Refills: 0 | Status: SHIPPED | OUTPATIENT
Start: 2018-11-30 | End: 2018-12-30 | Stop reason: SDUPTHER

## 2018-12-30 RX ORDER — FEXOFENADINE HCL 60 MG
TABLET ORAL
Qty: 60 TAB | Refills: 0 | Status: SHIPPED | OUTPATIENT
Start: 2018-12-30 | End: 2020-01-03

## 2019-01-25 ENCOUNTER — OFFICE VISIT (OUTPATIENT)
Dept: INTERNAL MEDICINE CLINIC | Facility: CLINIC | Age: 69
End: 2019-01-25

## 2019-01-25 DIAGNOSIS — M54.5 CHRONIC LOW BACK PAIN, UNSPECIFIED BACK PAIN LATERALITY, WITH SCIATICA PRESENCE UNSPECIFIED: ICD-10-CM

## 2019-01-25 DIAGNOSIS — G89.29 CHRONIC LOW BACK PAIN, UNSPECIFIED BACK PAIN LATERALITY, WITH SCIATICA PRESENCE UNSPECIFIED: ICD-10-CM

## 2019-01-25 DIAGNOSIS — R73.03 PREDIABETES: ICD-10-CM

## 2019-01-25 DIAGNOSIS — R06.2 WHEEZING: ICD-10-CM

## 2019-01-25 DIAGNOSIS — G89.29 CHRONIC PAIN OF RIGHT KNEE: Primary | ICD-10-CM

## 2019-01-25 DIAGNOSIS — M25.561 CHRONIC PAIN OF RIGHT KNEE: Primary | ICD-10-CM

## 2019-01-25 RX ORDER — ALBUTEROL SULFATE 90 UG/1
1 AEROSOL, METERED RESPIRATORY (INHALATION)
Qty: 1 INHALER | Refills: 2 | Status: SHIPPED | OUTPATIENT
Start: 2019-01-25

## 2019-01-25 NOTE — PROGRESS NOTES
Chief Complaint   Patient presents with    High Blood Sugar     recheck A1c     1. Have you been to the ER, urgent care clinic since your last visit? Hospitalized since your last visit? No    2. Have you seen or consulted any other health care providers outside of the [facility] since your last visit? Include any pap smears or colon screening.  No

## 2019-01-25 NOTE — PROGRESS NOTES
Subjective:      Xiomara Dunaway is a 80 y.o. female who presents today for   Chief Complaint   Patient presents with    High Blood Sugar     recheck A1c     Prediabetes: A1c today is 5.4  Patient has changed diet and been exercising    Knee pain right- chronic    Osteoporosis- taking calcium and vit d  Recent DEXA showed osteoporosis  Fosamax contraindicated due to    Gi issues  Patient interested in infusion    Patient Active Problem List    Diagnosis Date Noted    Essential hypertension with goal blood pressure less than 140/90 05/27/2016    Allergic rhinitis 05/27/2016    Lipoma 11/23/2015     Current Outpatient Medications   Medication Sig Dispense Refill    OTHER       COLLAGEN by Does Not Apply route.  fexofenadine (ALLEGRA) 60 mg tablet TAKE 1 TAB BY MOUTH TWO (2) TIMES A DAY. 60 Tab 0    terbinafine HCl (LAMISIL) 250 mg tablet Take 250 mg by mouth daily.  cyclobenzaprine (FLEXERIL) 5 mg tablet Take 1 Tab by mouth three (3) times daily as needed for Muscle Spasm(s). 30 Tab 0    amLODIPine-Olmesartan 10-20 mg tab TAKE 1 TABLET BY MOUTH EVERY DAY 30 Tab 5    omeprazole (PRILOSEC) 20 mg capsule TAKE ONE CAPSULE BY MOUTH EVERY DAY 30 Cap 1    cholecalciferol, vitamin D3, (VITAMIN D3) 2,000 unit tab Take  by mouth.  albuterol (PROVENTIL HFA, VENTOLIN HFA, PROAIR HFA) 90 mcg/actuation inhaler Take 1 Puff by inhalation every four (4) hours as needed for Wheezing.  1 Inhaler 2     Allergies   Allergen Reactions    Penicillins Hives and Swelling     SWELLING OF TONGUE     Past Medical History:   Diagnosis Date    Adverse effect of anesthesia     WOKE UP 1X DURING PROCEDURE    Arthritis     Environmental allergies     GERD (gastroesophageal reflux disease)     Heart murmur     Hypertension     Lipoma 11/23/2015    Nausea & vomiting     ONLY NAUSEA    PUD (peptic ulcer disease)      Past Surgical History:   Procedure Laterality Date    HX GI      COLONOSCOPY    HX GYN EXPLORATORY LAP    HX OTHER SURGICAL  1988    removed lipoma of head    HX OTHER SURGICAL  1/21/16     LOU for pinched nerve in back    HX OTHER SURGICAL  1/27/16    EXCISE RECURRENT LIPOMA POSTERIOR SCALP    HX RETINAL DETACHMENT REPAIR Left      Family History   Problem Relation Age of Onset    Hypertension Mother     Kidney Disease Mother         WAS ON DIALYSIS    Cancer Father         leukemia    Hypertension Sister     Other Brother         BRAIN ANEURYSM    Hypertension Brother     Anesth Problems Neg Hx      Social History     Tobacco Use    Smoking status: Never Smoker    Smokeless tobacco: Never Used   Substance Use Topics    Alcohol use: Yes     Alcohol/week: 2.4 oz     Types: 4 Glasses of wine per week     Comment: social        Review of Systems    A comprehensive review of systems was negative except for that written in the HPI. Objective:     Visit Vitals  LMP  (LMP Unknown)     General:  Alert, cooperative, no distress, appears stated age. Head:  Normocephalic, without obvious abnormality, atraumatic. Eyes:  Conjunctivae/corneas clear. PERRL, EOMs intact. Fundi benign. Ears:  Normal TMs and external ear canals both ears. Nose: Nares normal. Septum midline. Mucosa normal. No drainage or sinus tenderness. Throat: Lips, mucosa, and tongue normal. Teeth and gums normal.   Neck: Supple, symmetrical, trachea midline, no adenopathy, thyroid: no enlargement/tenderness/nodules, no carotid bruit and no JVD. Back:   Symmetric, no curvature. ROM normal. No CVA tenderness. Lungs:   Clear to auscultation bilaterally. Chest wall:  No tenderness or deformity. Heart:  Regular rate and rhythm, S1, S2 normal, no murmur, click, rub or gallop. Abdomen:   Soft, non-tender. Bowel sounds normal. No masses,  No organomegaly. Extremities: Extremities normal, atraumatic, no cyanosis or edema. Pulses: 2+ and symmetric all extremities.    Skin: Skin color, texture, turgor normal. No rashes or lesions. Lymph nodes: Cervical, supraclavicular, and axillary nodes normal.   Neurologic: CNII-XII intact. Normal strength, sensation and reflexes throughout. Assessment/Plan:       ICD-10-CM ICD-9-CM    1. Chronic pain of right knee M25.561 719.46 XR KNEE RT MAX 2 VWS   Probable osteoarthritis G89.29 338.29 REFERRAL TO ORTHOPEDIC SURGERY   2. Chronic low back pain, unspecified back pain laterality, with sciatica presence unspecified M54.5 724.2 REFERRAL TO PHYSICAL THERAPY    G89.29 338.29    3. Wheezing R06.2 786.07 albuterol (PROVENTIL HFA, VENTOLIN HFA, PROAIR HFA) 90 mcg/actuation inhaler       Follow-up Disposition: Not on File   Advised her to call back or return to office if symptoms worsen/change/persist.  Discussed expected course/resolution/complications of diagnosis in detail with patient. Medication risks/benefits/costs/interactions/alternatives discussed with patient. She was given an after visit summary which includes diagnoses, current medications, & vitals. She expressed understanding with the diagnosis and plan.

## 2019-01-28 LAB — HBA1C MFR BLD HPLC: 5.4 %

## 2019-02-05 ENCOUNTER — OFFICE VISIT (OUTPATIENT)
Dept: INTERNAL MEDICINE CLINIC | Facility: CLINIC | Age: 69
End: 2019-02-05

## 2019-02-05 VITALS
DIASTOLIC BLOOD PRESSURE: 88 MMHG | BODY MASS INDEX: 33.91 KG/M2 | RESPIRATION RATE: 16 BRPM | HEART RATE: 59 BPM | TEMPERATURE: 98.6 F | SYSTOLIC BLOOD PRESSURE: 128 MMHG | WEIGHT: 211 LBS | HEIGHT: 66 IN | OXYGEN SATURATION: 98 %

## 2019-02-05 DIAGNOSIS — J06.9 VIRAL URI WITH COUGH: Primary | ICD-10-CM

## 2019-02-05 RX ORDER — FLUTICASONE FUROATE AND VILANTEROL 100; 25 UG/1; UG/1
1 POWDER RESPIRATORY (INHALATION) DAILY
COMMUNITY
End: 2019-10-17

## 2019-02-05 NOTE — PROGRESS NOTES
Chief Complaint Patient presents with  URI  
  dry cough, worse at night x 5 days. no fever or body aches. Has tried OTC meds. 1. Have you been to the ER, urgent care clinic since your last visit? Hospitalized since your last visit? No 
 
2. Have you seen or consulted any other health care providers outside of the [address] [place] [address] since your last visit? Include any pap smears or colon screening. Yes Where: Pulmonologist Reason for visit: Asthma

## 2019-02-05 NOTE — PATIENT INSTRUCTIONS
Start mucinex and flonase (2 alternating sprays at night) to help symptoms. If no improvement by Thursday or Friday give me a call. These colds tend to last about 7 days. Viral Respiratory Infection: Care Instructions  Your Care Instructions    Viruses are very small organisms. They grow in number after they enter your body. There are many types that cause different illnesses, such as colds and the mumps. The symptoms of a viral respiratory infection often start quickly. They include a fever, sore throat, and runny nose. You may also just not feel well. Or you may not want to eat much. Most viral respiratory infections are not serious. They usually get better with time and self-care. Antibiotics are not used to treat a viral infection. That's because antibiotics will not help cure a viral illness. In some cases, antiviral medicine can help your body fight a serious viral infection. Follow-up care is a key part of your treatment and safety. Be sure to make and go to all appointments, and call your doctor if you are having problems. It's also a good idea to know your test results and keep a list of the medicines you take. How can you care for yourself at home? · Rest as much as possible until you feel better. · Be safe with medicines. Take your medicine exactly as prescribed. Call your doctor if you think you are having a problem with your medicine. You will get more details on the specific medicine your doctor prescribes. · Take an over-the-counter pain medicine, such as acetaminophen (Tylenol), ibuprofen (Advil, Motrin), or naproxen (Aleve), as needed for pain and fever. Read and follow all instructions on the label. Do not give aspirin to anyone younger than 20. It has been linked to Reye syndrome, a serious illness. · Drink plenty of fluids, enough so that your urine is light yellow or clear like water. Hot fluids, such as tea or soup, may help relieve congestion in your nose and throat.  If you have kidney, heart, or liver disease and have to limit fluids, talk with your doctor before you increase the amount of fluids you drink. · Try to clear mucus from your lungs by breathing deeply and coughing. · Gargle with warm salt water once an hour. This can help reduce swelling and throat pain. Use 1 teaspoon of salt mixed in 1 cup of warm water. · Do not smoke or allow others to smoke around you. If you need help quitting, talk to your doctor about stop-smoking programs and medicines. These can increase your chances of quitting for good. To avoid spreading the virus  · Cough or sneeze into a tissue. Then throw the tissue away. · If you don't have a tissue, use your hand to cover your cough or sneeze. Then clean your hand. You can also cough into your sleeve. · Wash your hands often. Use soap and warm water. Wash for 15 to 20 seconds each time. · If you don't have soap and water near you, you can clean your hands with alcohol wipes or gel. When should you call for help? Call your doctor now or seek immediate medical care if:    · You have a new or higher fever.     · Your fever lasts more than 48 hours.     · You have trouble breathing.     · You have a fever with a stiff neck or a severe headache.     · You are sensitive to light.     · You feel very sleepy or confused.    Watch closely for changes in your health, and be sure to contact your doctor if:    · You do not get better as expected. Where can you learn more? Go to http://sandoval-renzo.info/. Enter K872 in the search box to learn more about \"Viral Respiratory Infection: Care Instructions. \"  Current as of: September 5, 2018  Content Version: 11.9  © 6894-8138 [employer]. Care instructions adapted under license by [employer] (which disclaims liability or warranty for this information).  If you have questions about a medical condition or this instruction, always ask your healthcare professional. [employer] disclaims any warranty or liability for your use of this information.

## 2019-02-05 NOTE — PROGRESS NOTES
Subjective:      Mariza Tolbert is a 80 y.o. female who presents today for acute care. Cold like sx: symptoms started 5 days ago, they include dry cough that is worse at night, hoarseness, wheezing but she is not using her inhaler more than usual. She denies fever and body aches. She has tried OTC medications, delsyn. She notes symptoms are stable. Sick contacts: none    Patient Active Problem List    Diagnosis Date Noted    Essential hypertension with goal blood pressure less than 140/90 05/27/2016    Allergic rhinitis 05/27/2016    Lipoma 11/23/2015     Current Outpatient Medications   Medication Sig Dispense Refill    OTHER       COLLAGEN by Does Not Apply route.  albuterol (PROVENTIL HFA, VENTOLIN HFA, PROAIR HFA) 90 mcg/actuation inhaler Take 1 Puff by inhalation every four (4) hours as needed for Wheezing. 1 Inhaler 2    fexofenadine (ALLEGRA) 60 mg tablet TAKE 1 TAB BY MOUTH TWO (2) TIMES A DAY. 60 Tab 0    terbinafine HCl (LAMISIL) 250 mg tablet Take 250 mg by mouth daily.  cyclobenzaprine (FLEXERIL) 5 mg tablet Take 1 Tab by mouth three (3) times daily as needed for Muscle Spasm(s). 30 Tab 0    amLODIPine-Olmesartan 10-20 mg tab TAKE 1 TABLET BY MOUTH EVERY DAY 30 Tab 5    omeprazole (PRILOSEC) 20 mg capsule TAKE ONE CAPSULE BY MOUTH EVERY DAY 30 Cap 1    cholecalciferol, vitamin D3, (VITAMIN D3) 2,000 unit tab Take  by mouth.        Allergies   Allergen Reactions    Penicillins Hives and Swelling     SWELLING OF TONGUE     Past Medical History:   Diagnosis Date    Adverse effect of anesthesia     WOKE UP 1X DURING PROCEDURE    Arthritis     Environmental allergies     GERD (gastroesophageal reflux disease)     Heart murmur     Hypertension     Lipoma 11/23/2015    Nausea & vomiting     ONLY NAUSEA    PUD (peptic ulcer disease)      Past Surgical History:   Procedure Laterality Date    HX GI      COLONOSCOPY    HX GYN      EXPLORATORY LAP    [address] removed lipoma of head    HX OTHER SURGICAL  1/21/16     LOU for pinched nerve in back    HX OTHER SURGICAL  1/27/16    EXCISE RECURRENT LIPOMA POSTERIOR SCALP    HX RETINAL DETACHMENT REPAIR Left         Review of Systems    A comprehensive review of systems was negative except for that written in the HPI. Objective:     Visit Vitals  /88   Pulse (!) 59   Temp 98.6 °F (37 °C) (Oral)   Resp 16   Ht 5' 6\" (1.676 m)   Wt 211 lb (95.7 kg)   LMP  (LMP Unknown)   SpO2 98%   BMI 34.06 kg/m²     General appearance: alert, cooperative, no distress, appears stated age  Head: Normocephalic, without obvious abnormality, atraumatic  Eyes: negative  Ears: abnormal TM AD - erythematous, abnormal TM AS - erythematous  Nose: turbinates edematous, inflamed, no sinus tenderness  Throat: Lips, mucosa, and tongue normal. Teeth and gums normal  Neck: supple, symmetrical, trachea midline and no adenopathy  Lungs: clear to auscultation bilaterally  Heart: regular rate and rhythm, S1, S2 normal, no murmur, click, rub or gallop  Neurologic: Grossly normal  Nursing note and vitals reviewed  Assessment/Plan:       ICD-10-CM ICD-9-CM    1. Viral URI with cough J06.9 465.9     B97.89         Follow-up Disposition:  Return if symptoms worsen or fail to improve. Advised her to call back or return to office if symptoms worsen/change/persist.  Discussed expected course/resolution/complications of diagnosis in detail with patient. Medication risks/benefits/costs/interactions/alternatives discussed with patient. She was given an after visit summary which includes diagnoses, current medications, & vitals. She expressed understanding with the diagnosis and plan.

## 2019-07-30 RX ORDER — AMLODIPINE AND OLMESARTAN MEDOXOMIL 10; 20 MG/1; MG/1
TABLET ORAL
Qty: 30 TAB | Refills: 5 | Status: CANCELLED | OUTPATIENT
Start: 2019-07-30

## 2019-10-09 ENCOUNTER — OFFICE VISIT (OUTPATIENT)
Dept: INTERNAL MEDICINE CLINIC | Age: 69
End: 2019-10-09

## 2019-10-09 ENCOUNTER — HOSPITAL ENCOUNTER (OUTPATIENT)
Dept: LAB | Age: 69
Discharge: HOME | End: 2019-10-09
Payer: MEDICARE

## 2019-10-09 VITALS
SYSTOLIC BLOOD PRESSURE: 149 MMHG | TEMPERATURE: 98.6 F | HEIGHT: 66 IN | BODY MASS INDEX: 32.95 KG/M2 | RESPIRATION RATE: 16 BRPM | WEIGHT: 205 LBS | DIASTOLIC BLOOD PRESSURE: 79 MMHG | HEART RATE: 52 BPM

## 2019-10-09 DIAGNOSIS — I10 ESSENTIAL HYPERTENSION WITH GOAL BLOOD PRESSURE LESS THAN 140/90: ICD-10-CM

## 2019-10-09 DIAGNOSIS — R53.83 FATIGUE, UNSPECIFIED TYPE: ICD-10-CM

## 2019-10-09 DIAGNOSIS — E55.9 VITAMIN D DEFICIENCY: ICD-10-CM

## 2019-10-09 DIAGNOSIS — Z00.00 PHYSICAL EXAM: Primary | ICD-10-CM

## 2019-10-09 DIAGNOSIS — J30.9 ALLERGIC RHINITIS, UNSPECIFIED SEASONALITY, UNSPECIFIED TRIGGER: ICD-10-CM

## 2019-10-09 DIAGNOSIS — Z12.4 PAP SMEAR FOR CERVICAL CANCER SCREENING: ICD-10-CM

## 2019-10-09 DIAGNOSIS — Z11.3 ROUTINE SCREENING FOR STI (SEXUALLY TRANSMITTED INFECTION): ICD-10-CM

## 2019-10-09 LAB
BILIRUB UR QL STRIP: NEGATIVE
GLUCOSE UR-MCNC: NEGATIVE MG/DL
KETONES P FAST UR STRIP-MCNC: NEGATIVE MG/DL
PH UR STRIP: 6 [PH] (ref 4.6–8)
PROT UR QL STRIP: NEGATIVE
SP GR UR STRIP: 1.01 (ref 1–1.03)
UA UROBILINOGEN AMB POC: NORMAL (ref 0.2–1)
URINALYSIS CLARITY POC: CLEAR
URINALYSIS COLOR POC: YELLOW
URINE BLOOD POC: NEGATIVE
URINE LEUKOCYTES POC: NEGATIVE
URINE NITRITES POC: NEGATIVE

## 2019-10-09 PROCEDURE — 88175 CYTOPATH C/V AUTO FLUID REDO: CPT

## 2019-10-09 RX ORDER — AMLODIPINE AND OLMESARTAN MEDOXOMIL 10; 20 MG/1; MG/1
TABLET ORAL
COMMUNITY
End: 2020-08-31 | Stop reason: SDUPTHER

## 2019-10-09 NOTE — PROGRESS NOTES
Chief Complaint   Patient presents with    Physical     1. Have you been to the ER, urgent care clinic since your last visit? Hospitalized since your last visit? No    2. Have you seen or consulted any other health care providers outside of the [address] [place] [address] since your last visit? Include any pap smears or colon screening.  No

## 2019-10-09 NOTE — PROGRESS NOTES
Subjective:      Fareed Doherty is a 77 y.o. female who presents today for   Chief Complaint   Patient presents with    Physical   Patient in today for complete physical and pap    Chronic back pain- requests DMV sticker today  Sees Dr. Jenita Landau     Mammogram normal 2019  Pap smear to be updated today  DEXA- shows osteoporosis- takes calcium and Vit D  Colonoscopy done Aug 24  2018 Dr. Anatoly Monge- treated for H. Pylori, given rx for simethicone  ophthalmologist -   Dr. Demetra Moeller follows regularly     Influenza vaccine patient will get at [facility]  Pneumovax 12/2016  TDAP up todate within 10 year window  She states her zostavax is up to date     Constipation: Takes Fiber1 supplement daily for constipation. High fiber diet. Takes miralax daily. Takes linzess daily                 Patient Active Problem List    Diagnosis Date Noted    Essential hypertension with goal blood pressure less than 140/90 05/27/2016    Allergic rhinitis 05/27/2016    Lipoma 11/23/2015     Current Outpatient Medications   Medication Sig Dispense Refill    fluticasone furoate (ARNUITY ELLIPTA) 200 mcg/actuation dsdv inhaler Take 1 Puff by inhalation daily.  amLODIPine-Olmesartan (AVERY) 10-20 mg tab Take  by mouth.  linaCLOtide (LINZESS) 145 mcg cap capsule Take 1 Cap by mouth Daily (before breakfast). 30 Cap 3    OTHER       albuterol (PROVENTIL HFA, VENTOLIN HFA, PROAIR HFA) 90 mcg/actuation inhaler Take 1 Puff by inhalation every four (4) hours as needed for Wheezing. 1 Inhaler 2    fexofenadine (ALLEGRA) 60 mg tablet TAKE 1 TAB BY MOUTH TWO (2) TIMES A DAY. 60 Tab 0    omeprazole (PRILOSEC) 20 mg capsule TAKE ONE CAPSULE BY MOUTH EVERY DAY 30 Cap 1    fluticasone-vilanterol (BREO ELLIPTA) 100-25 mcg/dose inhaler Take 1 Puff by inhalation daily.  COLLAGEN by Does Not Apply route.  terbinafine HCl (LAMISIL) 250 mg tablet Take 250 mg by mouth daily.       cyclobenzaprine (FLEXERIL) 5 mg tablet Take 1 Tab by mouth three (3) times daily as needed for Muscle Spasm(s). 30 Tab 0    amLODIPine-Olmesartan 10-20 mg tab TAKE 1 TABLET BY MOUTH EVERY DAY 30 Tab 5    cholecalciferol, vitamin D3, (VITAMIN D3) 2,000 unit tab Take  by mouth. Allergies   Allergen Reactions    Penicillins Hives and Swelling     SWELLING OF TONGUE     Past Medical History:   Diagnosis Date    Adverse effect of anesthesia     WOKE UP 1X DURING PROCEDURE    Arthritis     Environmental allergies     GERD (gastroesophageal reflux disease)     Heart murmur     Hypertension     Lipoma 11/23/2015    Nausea & vomiting     ONLY NAUSEA    PUD (peptic ulcer disease)      Past Surgical History:   Procedure Laterality Date    HX GI      COLONOSCOPY    HX GYN      EXPLORATORY LAP    HX OTHER SURGICAL  1988    removed lipoma of head    HX OTHER SURGICAL  1/21/16     LOU for pinched nerve in back    HX OTHER SURGICAL  1/27/16    EXCISE RECURRENT LIPOMA POSTERIOR SCALP    HX RETINAL DETACHMENT REPAIR Left      Family History   Problem Relation Age of Onset    Hypertension Mother     Kidney Disease Mother         WAS ON DIALYSIS    Cancer Father         leukemia    Hypertension Sister     Other Brother         BRAIN ANEURYSM    Hypertension Brother     Anesth Problems Neg Hx      Social History     Tobacco Use    Smoking status: Never Smoker    Smokeless tobacco: Never Used   Substance Use Topics    Alcohol use: Yes     Alcohol/week: 4.0 standard drinks     Types: 4 Glasses of wine per week     Comment: social        Review of Systems    A comprehensive review of systems was negative except for that written in the HPI. Objective:     Visit Vitals  /79   Pulse (!) 52   Temp 98.6 °F (37 °C) (Oral)   Resp 16   Ht 5' 6\" (1.676 m)   Wt 205 lb (93 kg)   LMP  (LMP Unknown)   BMI 33.09 kg/m²     General:  Alert, cooperative, no distress, appears stated age. Head:  Normocephalic, without obvious abnormality, atraumatic.    Eyes: Conjunctivae/corneas clear. PERRL, EOMs intact. Fundi benign. Ears:  Normal TMs and external ear canals both ears. Nose: Nares normal. Septum midline. Mucosa normal. No drainage or sinus tenderness. Throat: Lips, mucosa, and tongue normal. Teeth and gums normal.   Neck: Supple, symmetrical, trachea midline, no adenopathy, thyroid: no enlargement/tenderness/nodules, no carotid bruit and no JVD. Back:   Symmetric, no curvature. ROM normal. No CVA tenderness. Lungs:   Clear to auscultation bilaterally. Chest wall:  No tenderness or deformity. Heart:  Regular rate and rhythm, S1, S2 normal, no murmur, click, rub or gallop. Abdomen:   Soft, non-tender. Bowel sounds normal. No masses,  No organomegaly. Extremities: Extremities normal, atraumatic, no cyanosis or edema. Pulses: 2+ and symmetric all extremities. Skin: Skin color, texture, turgor normal. No rashes or lesions. Lymph nodes: Cervical, supraclavicular, and axillary nodes normal.   Neurologic: CNII-XII intact. Normal strength, sensation and reflexes throughout. Breasts: symmetrical, no masses, no adenopathy  Pelvic: normal external genitalia, no CMT, normal adnexa       Assessment/Plan:       ICD-10-CM ICD-9-CM    1. Physical exam Z00.00 V70.9 PAP IG, RFX HPV ASCU, 80&97,71([id number])      METABOLIC PANEL, COMPREHENSIVE      LIPID PANEL      CBC WITH AUTOMATED DIFF      VITAMIN D, 25 HYDROXY      HEMOGLOBIN A1C W/O EAG      AMB POC URINALYSIS DIP STICK AUTO W/O MICRO   2. Fatigue, unspecified type R53.83 780.79 VITAMIN D, 25 HYDROXY      VITAMIN B12      IRON      TSH 3RD GENERATION   3. Allergic rhinitis, unspecified seasonality, unspecified trigger J30.9 477.9 Continue allegra   4. Essential hypertension with goal blood pressure less than 140/90 I10 401.9 Continue AVERY   5. Vitamin D deficiency E55.9 268.9 VITAMIN D, 25 HYDROXY   6. Routine screening for STI (sexually transmitted infection) Z11.3 V74.5 NUSWAB VAGINITIS PLUS   7.  Pap smear for cervical cancer screening Z12.4 V76.2 PAP IG, RFX HPV ASCU, 87&01,79([id number])          Advised her to call back or return to office if symptoms worsen/change/persist.  Discussed expected course/resolution/complications of diagnosis in detail with patient. Medication risks/benefits/costs/interactions/alternatives discussed with patient. She was given an after visit summary which includes diagnoses, current medications, & vitals. She expressed understanding with the diagnosis and plan.

## 2019-10-10 LAB
25(OH)D3+25(OH)D2 SERPL-MCNC: 30.9 NG/ML (ref 30–100)
ALBUMIN SERPL-MCNC: 4.8 G/DL (ref 3.6–4.8)
ALBUMIN/GLOB SERPL: 1.6 {RATIO} (ref 1.2–2.2)
ALP SERPL-CCNC: 201 IU/L (ref 39–117)
ALT SERPL-CCNC: 23 IU/L (ref 0–32)
AST SERPL-CCNC: 22 IU/L (ref 0–40)
BASOPHILS # BLD AUTO: 0.1 X10E3/UL (ref 0–0.2)
BASOPHILS NFR BLD AUTO: 2 %
BILIRUB SERPL-MCNC: 0.3 MG/DL (ref 0–1.2)
BUN SERPL-MCNC: 16 MG/DL (ref 8–27)
BUN/CREAT SERPL: 18 (ref 12–28)
CALCIUM SERPL-MCNC: 10.1 MG/DL (ref 8.7–10.3)
CHLORIDE SERPL-SCNC: 102 MMOL/L (ref 96–106)
CHOLEST SERPL-MCNC: 253 MG/DL (ref 100–199)
CO2 SERPL-SCNC: 25 MMOL/L (ref 20–29)
CREAT SERPL-MCNC: 0.88 MG/DL (ref 0.57–1)
EOSINOPHIL # BLD AUTO: 0.2 X10E3/UL (ref 0–0.4)
EOSINOPHIL NFR BLD AUTO: 4 %
ERYTHROCYTE [DISTWIDTH] IN BLOOD BY AUTOMATED COUNT: 14.2 % (ref 12.3–15.4)
GLOBULIN SER CALC-MCNC: 3 G/DL (ref 1.5–4.5)
GLUCOSE SERPL-MCNC: 85 MG/DL (ref 65–99)
HBA1C MFR BLD: 5.6 % (ref 4.8–5.6)
HCT VFR BLD AUTO: 37.2 % (ref 34–46.6)
HDLC SERPL-MCNC: 71 MG/DL
HGB BLD-MCNC: 12.5 G/DL (ref 11.1–15.9)
IMM GRANULOCYTES # BLD AUTO: 0 X10E3/UL (ref 0–0.1)
IMM GRANULOCYTES NFR BLD AUTO: 0 %
IRON SERPL-MCNC: 72 UG/DL (ref 27–139)
LDLC SERPL CALC-MCNC: 167 MG/DL (ref 0–99)
LYMPHOCYTES # BLD AUTO: 1.8 X10E3/UL (ref 0.7–3.1)
LYMPHOCYTES NFR BLD AUTO: 45 %
MCH RBC QN AUTO: 28 PG (ref 26.6–33)
MCHC RBC AUTO-ENTMCNC: 33.6 G/DL (ref 31.5–35.7)
MCV RBC AUTO: 83 FL (ref 79–97)
MONOCYTES # BLD AUTO: 0.4 X10E3/UL (ref 0.1–0.9)
MONOCYTES NFR BLD AUTO: 11 %
NEUTROPHILS # BLD AUTO: 1.5 X10E3/UL (ref 1.4–7)
NEUTROPHILS NFR BLD AUTO: 38 %
PLATELET # BLD AUTO: 295 X10E3/UL (ref 150–450)
POTASSIUM SERPL-SCNC: 4.5 MMOL/L (ref 3.5–5.2)
PROT SERPL-MCNC: 7.8 G/DL (ref 6–8.5)
RBC # BLD AUTO: 4.46 X10E6/UL (ref 3.77–5.28)
SODIUM SERPL-SCNC: 142 MMOL/L (ref 134–144)
TRIGL SERPL-MCNC: 77 MG/DL (ref 0–149)
TSH SERPL DL<=0.005 MIU/L-ACNC: 2.26 UIU/ML (ref 0.45–4.5)
VIT B12 SERPL-MCNC: 1616 PG/ML (ref 232–1245)
VLDLC SERPL CALC-MCNC: 15 MG/DL (ref 5–40)
WBC # BLD AUTO: 3.9 X10E3/UL (ref 3.4–10.8)

## 2019-10-12 LAB
A VAGINAE DNA VAG QL NAA+PROBE: ABNORMAL SCORE
BVAB2 DNA VAG QL NAA+PROBE: ABNORMAL SCORE
C ALBICANS DNA VAG QL NAA+PROBE: NEGATIVE
C GLABRATA DNA VAG QL NAA+PROBE: NEGATIVE
C TRACH RRNA SPEC QL NAA+PROBE: NEGATIVE
MEGA1 DNA VAG QL NAA+PROBE: ABNORMAL SCORE
N GONORRHOEA RRNA SPEC QL NAA+PROBE: NEGATIVE
T VAGINALIS RRNA SPEC QL NAA+PROBE: NEGATIVE

## 2019-10-15 ENCOUNTER — TELEPHONE (OUTPATIENT)
Dept: INTERNAL MEDICINE CLINIC | Age: 69
End: 2019-10-15

## 2019-10-15 RX ORDER — METRONIDAZOLE 500 MG/1
500 TABLET ORAL 2 TIMES DAILY
Qty: 14 TAB | Refills: 0 | Status: SHIPPED | OUTPATIENT
Start: 2019-10-15 | End: 2019-10-22

## 2019-10-15 NOTE — PROGRESS NOTES
Negative urine    Bacterial vaginosis- send in flagyl 500 mg   1 tab po bid x 7 days # 14 tabs no refills    Normal kidney and liver    Normal blood counts    Normal Vit d and A1c    Normal iron and thyroid    B12 is too high- if she is taking supplement cut back or discontinue    Cholesterol is very high and she needs medication

## 2019-10-15 NOTE — TELEPHONE ENCOUNTER
[facility]. [facility] given by dr Prince Alonso to send over a prescription for flagyl 500mg tab one tab BID for 7 days #14 with no refills.  John Tavarez LPN

## 2019-10-27 RX ORDER — AMLODIPINE AND OLMESARTAN MEDOXOMIL 10; 20 MG/1; MG/1
TABLET ORAL
Qty: 90 TAB | Refills: 2 | Status: SHIPPED | OUTPATIENT
Start: 2019-10-27 | End: 2020-10-23 | Stop reason: SDUPTHER

## 2019-12-02 ENCOUNTER — OFFICE VISIT (OUTPATIENT)
Dept: INTERNAL MEDICINE CLINIC | Age: 69
End: 2019-12-02

## 2019-12-02 VITALS
DIASTOLIC BLOOD PRESSURE: 81 MMHG | HEART RATE: 88 BPM | BODY MASS INDEX: 33.3 KG/M2 | TEMPERATURE: 99.1 F | SYSTOLIC BLOOD PRESSURE: 134 MMHG | WEIGHT: 207.2 LBS | HEIGHT: 66 IN | RESPIRATION RATE: 16 BRPM

## 2019-12-02 DIAGNOSIS — M54.42 ACUTE LEFT-SIDED LOW BACK PAIN WITH LEFT-SIDED SCIATICA: Primary | ICD-10-CM

## 2019-12-02 RX ORDER — ACETAMINOPHEN 325 MG/1
650 TABLET ORAL EVERY 8 HOURS
Qty: 180 TAB | Refills: 1 | Status: SHIPPED | OUTPATIENT
Start: 2019-12-02 | End: 2021-09-24

## 2019-12-02 RX ORDER — METHYLPREDNISOLONE 4 MG/1
TABLET ORAL
Qty: 1 DOSE PACK | Refills: 0 | Status: SHIPPED | OUTPATIENT
Start: 2019-12-02 | End: 2019-12-08

## 2019-12-02 RX ORDER — CYCLOBENZAPRINE HCL 10 MG
5 TABLET ORAL
Qty: 30 TAB | Refills: 0 | Status: SHIPPED | OUTPATIENT
Start: 2019-12-02 | End: 2020-11-11 | Stop reason: SDUPTHER

## 2019-12-02 NOTE — PROGRESS NOTES
Chief Complaint   Patient presents with    Back Pain     back pain for the last several days. 1. Have you been to the ER, urgent care clinic since your last visit? Hospitalized since your last visit? No    2. Have you seen or consulted any other health care providers outside of the [address] [place] [address] since your last visit? Include any pap smears or colon screening.  No

## 2019-12-02 NOTE — PROGRESS NOTES
Subjective:      Gilford Hasten is a 77 y.o. female who presents today for   Chief Complaint   Patient presents with    Back Pain     back pain for the last several days. Urinalysis negative    C/o back pain for last several days. She is having a lot of problems walking and climbing stairs. Feels better if she lies down with pillow between her legs it feels better. Numbness down left left leg    MRI done in 2015 showed multilevel degenerative change worse at L4-L5  Small disc bulge at L5-S1    She has seen Dr. Remi Felder in the past    Patient Active Problem List    Diagnosis Date Noted    Essential hypertension with goal blood pressure less than 140/90 05/27/2016    Allergic rhinitis 05/27/2016    Lipoma 11/23/2015     Current Outpatient Medications   Medication Sig Dispense Refill    amLODIPine-Olmesartan (AVERY) 10-20 mg tab TAKE 1 TABLET BY MOUTH EVERY DAY 90 Tab 2    fluticasone furoate (ARNUITY ELLIPTA) 200 mcg/actuation dsdv inhaler Take 1 Puff by inhalation daily.  amLODIPine-Olmesartan (AVERY) 10-20 mg tab Take  by mouth.  linaCLOtide (LINZESS) 145 mcg cap capsule Take 1 Cap by mouth Daily (before breakfast). 30 Cap 3    OTHER       albuterol (PROVENTIL HFA, VENTOLIN HFA, PROAIR HFA) 90 mcg/actuation inhaler Take 1 Puff by inhalation every four (4) hours as needed for Wheezing. 1 Inhaler 2    fexofenadine (ALLEGRA) 60 mg tablet TAKE 1 TAB BY MOUTH TWO (2) TIMES A DAY.  60 Tab 0    omeprazole (PRILOSEC) 20 mg capsule TAKE ONE CAPSULE BY MOUTH EVERY DAY 30 Cap 1     Allergies   Allergen Reactions    Penicillins Hives and Swelling     SWELLING OF TONGUE     Past Medical History:   Diagnosis Date    Adverse effect of anesthesia     WOKE UP 1X DURING PROCEDURE    Arthritis     Environmental allergies     GERD (gastroesophageal reflux disease)     Heart murmur     Hypertension     Lipoma 11/23/2015    Nausea & vomiting     ONLY NAUSEA    PUD (peptic ulcer disease)      Past Surgical History:   Procedure Laterality Date    HX GI      COLONOSCOPY    HX GYN      EXPLORATORY LAP    HX OTHER SURGICAL  1988    removed lipoma of head    HX OTHER SURGICAL  1/21/16     LOU for pinched nerve in back    HX OTHER SURGICAL  1/27/16    EXCISE RECURRENT LIPOMA POSTERIOR SCALP    HX RETINAL DETACHMENT REPAIR Left      Family History   Problem Relation Age of Onset    Hypertension Mother     Kidney Disease Mother         WAS ON DIALYSIS    Cancer Father         leukemia    Hypertension Sister     Other Brother         BRAIN ANEURYSM    Hypertension Brother     Anesth Problems Neg Hx      Social History     Tobacco Use    Smoking status: Never Smoker    Smokeless tobacco: Never Used   Substance Use Topics    Alcohol use: Yes     Alcohol/week: 4.0 standard drinks     Types: 4 Glasses of wine per week     Comment: social        Review of Systems    A comprehensive review of systems was negative except for that written in the HPI. Objective:     Visit Vitals  /81   Pulse 88   Temp 99.1 °F (37.3 °C) (Oral)   Resp 16   Ht 5' 6\" (1.676 m)   Wt 207 lb 3.2 oz (94 kg)   LMP  (LMP Unknown)   BMI 33.44 kg/m²     General:  Alert, cooperative, no distress, appears stated age. Head:  Normocephalic, without obvious abnormality, atraumatic. Eyes:  Conjunctivae/corneas clear. PERRL, EOMs intact. Fundi benign. Ears:  Normal TMs and external ear canals both ears. Nose: Nares normal. Septum midline. Mucosa normal. No drainage or sinus tenderness. Throat: Lips, mucosa, and tongue normal. Teeth and gums normal.   Neck: Supple, symmetrical, trachea midline, no adenopathy, thyroid: no enlargement/tenderness/nodules, no carotid bruit and no JVD. Back:   Symmetric, no curvature. limited ROM, difficulty climbing on table due to pain, point tenderness left lumbar spine   Lungs:   Clear to auscultation bilaterally. Chest wall:  No tenderness or deformity.    Heart:  Regular rate and rhythm, S1, S2 normal, no murmur, click, rub or gallop. Abdomen:   Soft, non-tender. no CVA tenderness   Extremities: Extremities normal, atraumatic, no cyanosis or edema. Pulses: 2+ and symmetric all extremities. Neurologic: CNII-XII intact. Normal strength, sensation and reflexes throughout. Assessment/Plan:       ICD-10-CM ICD-9-CM    1. Acute left-sided low back pain with left-sided sciatica M54.42 724.2 REFERRAL TO ORTHOPEDICS     724.3 Scheduled Tylenol every 8 hours  Flexeril as needed. Warned about sedation  Medrol dose pack          Advised her to call back or return to office if symptoms worsen/change/persist.  Discussed expected course/resolution/complications of diagnosis in detail with patient. Medication risks/benefits/costs/interactions/alternatives discussed with patient. She was given an after visit summary which includes diagnoses, current medications, & vitals. She expressed understanding with the diagnosis and plan.

## 2020-01-03 RX ORDER — FEXOFENADINE HCL 60 MG
TABLET ORAL
Qty: 60 TAB | Refills: 0 | Status: SHIPPED | OUTPATIENT
Start: 2020-01-03

## 2020-04-27 ENCOUNTER — VIRTUAL VISIT (OUTPATIENT)
Dept: INTERNAL MEDICINE CLINIC | Age: 70
End: 2020-04-27

## 2020-04-27 DIAGNOSIS — E78.5 HYPERLIPIDEMIA, UNSPECIFIED HYPERLIPIDEMIA TYPE: ICD-10-CM

## 2020-04-27 DIAGNOSIS — J32.9 SINUSITIS, UNSPECIFIED CHRONICITY, UNSPECIFIED LOCATION: Primary | ICD-10-CM

## 2020-04-27 RX ORDER — LORATADINE 10 MG/1
10 TABLET ORAL DAILY
Qty: 30 TAB | Refills: 2 | Status: SHIPPED | OUTPATIENT
Start: 2020-04-27 | End: 2020-07-19

## 2020-04-27 RX ORDER — AZITHROMYCIN 250 MG/1
TABLET, FILM COATED ORAL
Qty: 6 TAB | Refills: 0 | Status: SHIPPED | OUTPATIENT
Start: 2020-04-27 | End: 2020-05-02

## 2020-04-27 NOTE — PROGRESS NOTES
Patti Sanders is a 77 y.o. female who was seen by synchronous (real-time) audio-video technology on 4/27/2020. Consent: Patti Sanders, who was seen by synchronous (real-time) audio-video technology, and/or her healthcare decision maker, is aware that this patient-initiated, Telehealth encounter on 4/27/2020 is a billable service, with coverage as determined by her insurance carrier. She is aware that she may receive a bill and has provided verbal consent to proceed: Yes. Assessment & Plan:   Diagnoses and all orders for this visit:    1. Sinusitis, unspecified chronicity, unspecified location  -     azithromycin (ZITHROMAX) 250 mg tablet; Take 2 tablets today, then take 1 tablet daily    2. Hyperlipidemia, unspecified hyperlipidemia type  Low fat and low cholesterol diet. Patient wants to hold off on meds for now    Other orders  -     loratadine (Claritin) 10 mg tablet; Take 1 Tab by mouth daily. Advised to stop allegra start claritin 10 mg daily  ZPACK x 1              Subjective:   Patti Sanders is a 77 y.o. female who was seen for No chief complaint on file. Having trouble with her sinuses. She has pain behind her eyes feels like teeth are aching. No fever or chills. She has been using a sinus rinse. No cough or shortness of breath  Has used saline and flonase    She has high cholesterol- we will recheck that when she comes back in        Prior to Admission medications    Medication Sig Start Date End Date Taking? Authorizing Provider   fexofenadine (ALLEGRA) 60 mg tablet TAKE 1 TAB BY MOUTH TWO (2) TIMES A DAY. 1/3/20   Wiliam Swanson MD   acetaminophen (TYLENOL) 325 mg tablet Take 2 Tabs by mouth every eight (8) hours. 12/2/19   Wiliam Swanson MD   cyclobenzaprine (FLEXERIL) 10 mg tablet Take 0.5 Tabs by mouth three (3) times daily as needed for Muscle Spasm(s).  12/2/19   Wiliam Swanson MD   amLODIPine-Olmesartan (AVERY) 10-20 mg tab TAKE 1 TABLET BY MOUTH EVERY DAY 10/27/19 Johanny Helm MD   fluticasone furoate (ARNUITY ELLIPTA) 200 mcg/actuation dsdv inhaler Take 1 Puff by inhalation daily. Provider, Historical   amLODIPine-Olmesartan (AVERY) 10-20 mg tab Take  by mouth. Provider, Historical   linaCLOtide (LINZESS) 145 mcg cap capsule Take 1 Cap by mouth Daily (before breakfast). 6/29/19   Johanny Helm MD   OTHER     Provider, Historical   albuterol (PROVENTIL HFA, VENTOLIN HFA, PROAIR HFA) 90 mcg/actuation inhaler Take 1 Puff by inhalation every four (4) hours as needed for Wheezing. 1/25/19   Johanny Helm MD   omeprazole (PRILOSEC) 20 mg capsule TAKE ONE CAPSULE BY MOUTH EVERY DAY 10/25/17   Johanny Helm MD     Allergies   Allergen Reactions    Penicillins Hives and Swelling     SWELLING OF TONGUE           Review of Systems   Constitutional: Negative for weight loss. HENT: Positive for congestion. Eyes: Negative for blurred vision. Respiratory: Negative for shortness of breath. Cardiovascular: Negative for chest pain and leg swelling. Genitourinary: Negative for frequency and urgency. Musculoskeletal: Negative for joint pain. Neurological: Negative for headaches.        Objective:   Vital Signs: (As obtained by patient/caregiver at home)  Visit Vitals  LMP  (LMP Unknown)        [INSTRUCTIONS:  \"[x]\" Indicates a positive item  \"[]\" Indicates a negative item  -- DELETE ALL ITEMS NOT EXAMINED]    Constitutional: [x] Appears well-developed and well-nourished [x] No apparent distress      [] Abnormal -     Mental status: [x] Alert and awake  [x] Oriented to person/place/time [x] Able to follow commands    [] Abnormal -     Eyes:   EOM    [x]  Normal    [] Abnormal -   Sclera  [x]  Normal    [] Abnormal -          Discharge [x]  None visible   [] Abnormal -     HENT: [x] Normocephalic, atraumatic  [] Abnormal -   [x] Mouth/Throat: Mucous membranes are moist    External Ears [x] Normal  [] Abnormal -    Neck: [x] No visualized mass [] Abnormal - Pulmonary/Chest: [x] Respiratory effort normal   [x] No visualized signs of difficulty breathing or respiratory distress        [] Abnormal -      Musculoskeletal:   [x] Normal gait with no signs of ataxia         [x] Normal range of motion of neck        [] Abnormal -     Neurological:        [x] No Facial Asymmetry (Cranial nerve 7 motor function) (limited exam due to video visit)          [x] No gaze palsy        [] Abnormal -          Skin:        [x] No significant exanthematous lesions or discoloration noted on facial skin         [] Abnormal -            Psychiatric:       [x] Normal Affect [] Abnormal -        [x] No Hallucinations    Other pertinent observable physical exam findings:-        We discussed the expected course, resolution and complications of the diagnosis(es) in detail. Medication risks, benefits, costs, interactions, and alternatives were discussed as indicated. I advised her to contact the office if her condition worsens, changes or fails to improve as anticipated. She expressed understanding with the diagnosis(es) and plan. Gail Mcmahan is a 77 y.o. female who was evaluated by a video visit encounter for concerns as above. Patient identification was verified prior to start of the visit. A caregiver was present when appropriate. Due to this being a TeleHealth encounter (During VXGI-79 public health emergency), evaluation of the following organ systems was limited: Vitals/Constitutional/EENT/Resp/CV/GI//MS/Neuro/Skin/Heme-Lymph-Imm. Pursuant to the emergency declaration under the [facility], [facility] waiver authority and the [employer] General Act, this Virtual  Visit was conducted, with patient's (and/or legal guardian's) consent, to reduce the patient's risk of exposure to COVID-19 and provide necessary medical care.      Services were provided through a video synchronous discussion virtually to substitute for in-person clinic visit. Patient and provider were located at their individual homes.       Kaycee Barrett MD

## 2020-07-19 RX ORDER — LORATADINE 10 MG/1
TABLET ORAL
Qty: 90 TAB | Refills: 0 | Status: SHIPPED | OUTPATIENT
Start: 2020-07-19 | End: 2021-09-24

## 2020-10-27 RX ORDER — AMLODIPINE AND OLMESARTAN MEDOXOMIL 10; 20 MG/1; MG/1
TABLET ORAL
Qty: 90 TAB | Refills: 2 | Status: SHIPPED | OUTPATIENT
Start: 2020-10-27 | End: 2021-09-24

## 2020-10-29 ENCOUNTER — OFFICE VISIT (OUTPATIENT)
Dept: INTERNAL MEDICINE CLINIC | Age: 70
End: 2020-10-29
Payer: MEDICARE

## 2020-10-29 VITALS
BODY MASS INDEX: 32.78 KG/M2 | OXYGEN SATURATION: 95 % | HEIGHT: 66 IN | SYSTOLIC BLOOD PRESSURE: 147 MMHG | WEIGHT: 204 LBS | HEART RATE: 68 BPM | DIASTOLIC BLOOD PRESSURE: 81 MMHG | RESPIRATION RATE: 16 BRPM | TEMPERATURE: 98 F

## 2020-10-29 DIAGNOSIS — Z98.890 POST-OPERATIVE STATE: ICD-10-CM

## 2020-10-29 DIAGNOSIS — Z90.49 HISTORY OF APPENDECTOMY: ICD-10-CM

## 2020-10-29 DIAGNOSIS — Z90.49 HISTORY OF CHOLECYSTECTOMY: ICD-10-CM

## 2020-10-29 DIAGNOSIS — K59.03 DRUG-INDUCED CONSTIPATION: Primary | ICD-10-CM

## 2020-10-29 PROCEDURE — G8753 SYS BP > OR = 140: HCPCS | Performed by: FAMILY MEDICINE

## 2020-10-29 PROCEDURE — G8427 DOCREV CUR MEDS BY ELIG CLIN: HCPCS | Performed by: FAMILY MEDICINE

## 2020-10-29 PROCEDURE — 99214 OFFICE O/P EST MOD 30 MIN: CPT | Performed by: FAMILY MEDICINE

## 2020-10-29 PROCEDURE — G8536 NO DOC ELDER MAL SCRN: HCPCS | Performed by: FAMILY MEDICINE

## 2020-10-29 PROCEDURE — 1101F PT FALLS ASSESS-DOCD LE1/YR: CPT | Performed by: FAMILY MEDICINE

## 2020-10-29 PROCEDURE — G8399 PT W/DXA RESULTS DOCUMENT: HCPCS | Performed by: FAMILY MEDICINE

## 2020-10-29 PROCEDURE — G8510 SCR DEP NEG, NO PLAN REQD: HCPCS | Performed by: FAMILY MEDICINE

## 2020-10-29 PROCEDURE — 3017F COLORECTAL CA SCREEN DOC REV: CPT | Performed by: FAMILY MEDICINE

## 2020-10-29 PROCEDURE — G8417 CALC BMI ABV UP PARAM F/U: HCPCS | Performed by: FAMILY MEDICINE

## 2020-10-29 PROCEDURE — 1090F PRES/ABSN URINE INCON ASSESS: CPT | Performed by: FAMILY MEDICINE

## 2020-10-29 PROCEDURE — G8754 DIAS BP LESS 90: HCPCS | Performed by: FAMILY MEDICINE

## 2020-10-29 NOTE — PROGRESS NOTES
Chief Complaint   Patient presents with    Surgical Follow-up     Patient is here for a surgical follow up. 1. Have you been to the ER, urgent care clinic since your last visit? Hospitalized since your last visit? No    2. Have you seen or consulted any other health care providers outside of the [address] [place] [address] since your last visit? Include any pap smears or colon screening.  No

## 2020-10-29 NOTE — PROGRESS NOTES
SPORTS MEDICINE AND PRIMARY CARE  Moriah Cloud. MD Lac[place]  [address] [address]    Chief Complaint   Patient presents with    Surgical Follow-up     Patient is here for a surgical follow up. SUBJECTIVE:    Oscar Navarro is a 72 y.o. female for postsurgical follow-up. Patient had laparoscopic cholecystectomy and appendectomy on October 19 at [facility] after experiencing 1 to 2 days of nausea vomiting and abdominal pain. She has an appointment with her surgeon Dr. Ok Diaz on November 2. Other than significant constipation patient is eating well and her abdominal pain is controlled with 1 Norco a day. She is using stool softeners twice daily and she has used MOM but she continues to strain with defecation and feel pressure over her lower abdomen. She denies nausea vomiting fever chills. Patient has Maryellen York for constipation she had had it earlier point. Home blood pressure management is stable. Current Outpatient Medications   Medication Sig Dispense Refill    amLODIPine-Olmesartan (Lesli) 10-20 mg tab TAKE 1 TABLET BY MOUTH EVERY DAY 90 Tab 2    amLODIPine-Olmesartan (Lesli) 10-20 mg tab Take  by mouth. 90 Tab 1    fexofenadine (ALLEGRA) 60 mg tablet TAKE 1 TAB BY MOUTH TWO (2) TIMES A DAY. 60 Tab 0    acetaminophen (TYLENOL) 325 mg tablet Take 2 Tabs by mouth every eight (8) hours. 180 Tab 1    cyclobenzaprine (FLEXERIL) 10 mg tablet Take 0.5 Tabs by mouth three (3) times daily as needed for Muscle Spasm(s). 30 Tab 0    linaCLOtide (LINZESS) 145 mcg cap capsule Take 1 Cap by mouth Daily (before breakfast). 30 Cap 3    albuterol (PROVENTIL HFA, VENTOLIN HFA, PROAIR HFA) 90 mcg/actuation inhaler Take 1 Puff by inhalation every four (4) hours as needed for Wheezing. 1 Inhaler 2    loratadine (CLARITIN) 10 mg tablet TAKE 1 TABLET BY MOUTH EVERY DAY 90 Tab 0    fluticasone furoate (ARNUITY ELLIPTA) 200 mcg/actuation dsdv inhaler Take 1 Puff by inhalation daily.  OTHER       omeprazole (PRILOSEC) 20 mg capsule TAKE ONE CAPSULE BY MOUTH EVERY DAY 30 Cap 1     Past Medical History:   Diagnosis Date    Adverse effect of anesthesia     WOKE UP 1X DURING PROCEDURE    Arthritis     Environmental allergies     GERD (gastroesophageal reflux disease)     Heart murmur     Hypertension     Lipoma 11/23/2015    Nausea & vomiting     ONLY NAUSEA    PUD (peptic ulcer disease)      Past Surgical History:   Procedure Laterality Date    HX GI      COLONOSCOPY    HX GYN      EXPLORATORY LAP    HX OTHER SURGICAL  1988    removed lipoma of head    HX OTHER SURGICAL  1/21/16     LOU for pinched nerve in back    HX OTHER SURGICAL  1/27/16    EXCISE RECURRENT LIPOMA POSTERIOR SCALP    HX RETINAL DETACHMENT REPAIR Left      Allergies   Allergen Reactions    Penicillins Hives and Swelling     SWELLING OF TONGUE       REVIEW OF SYSTEMS:  General: negative for - chills or fever  ENT: negative for - headaches, nasal congestion, tinnitus, hearing loss, vision changes, sore throat  Respiratory: negative for - cough, hemoptysis, shortness of breath or wheezing  Cardiovascular : negative for - chest pain, edema, palpitations or shortness of breath  Gastrointestinal: +constipation; negative for - abdominal pain, blood in stools, heartburn or nausea/vomiting, diarrhea,   Genito-Urinary: no dysuria, trouble voiding, hematuria or erectile dysfunction  Musculoskeletal: negative for - gait disturbance, joint pain, joint stiffness , joint swelling, muscle aches  Neurological: no TIA or stroke symptoms  Hematologic: no bruises, no bleeding, no swollen glands  Integument: no lumps, mole changes, nail changes or rash  Endocrine:no malaise/lethargy or unexpected weight changes      Social History     Socioeconomic History    Marital status: SINGLE     Spouse name: Not on file    Number of children: Not on file    Years of education: Not on file    Highest education level: Not on file Tobacco Use    Smoking status: Never Smoker    Smokeless tobacco: Never Used   Substance and Sexual Activity    Alcohol use: Yes     Alcohol/week: 4.0 standard drinks     Types: 4 Glasses of wine per week     Comment: social    Drug use: No    Sexual activity: Yes     Partners: Male     Family History   Problem Relation Age of Onset    Hypertension Mother     Kidney Disease Mother         WAS ON DIALYSIS    Cancer Father         leukemia    Hypertension Sister     Other Brother         BRAIN ANEURYSM    Hypertension Brother     Anesth Problems Neg Hx        OBJECTIVE:     Visit Vitals  BP (!) 147/81   Pulse 68   Temp 98 °F (36.7 °C)   Resp 16   Ht 5' 6\" (1.676 m)   Wt 204 lb (92.5 kg)   LMP  (LMP Unknown)   SpO2 95%   BMI 32.93 kg/m²     CONSTITUTIONAL: Appears healthy and in no acute distress  EYES:  eom intact  ENMT:moist mucous membranes  NECK: supple. RESPIRATORY: Chest: clear bilaterally  CARDIOVASCULAR: Heart: regular rate and rhythm, pulse 1+   GASTROINTESTINAL: Abdomen: No distention, clean appearing ports,bowel sounds active, soft  MUSCULOSKELETAL: Extremities: no edema,   INTEGUMENT: Warm and dry. NEUROLOGIC: non-focal exam   MENTAL STATUS: alert and oriented, appropriate affect     ASSESSMENT/PLAN:   1. Post-operative state    2. History of appendectomy    3. History of cholecystectomy    4. Drug-induced constipation      71-year-old female status post laparoscopic cholecystectomy and appendectomy, stable. Experiencing opioid-induced constipation. History of chronic constipation. Linzess reinitiation, adequate water and fiber recommended. Patient to report poor response to Linzess. Patient will follow up with her surgeon in 1 week. I have discussed the diagnosis with the patient and the intended plan as seen in the  orders above. The patient understands and agees with the plan.   The patient has   received an after visit summary and questions were answered concerning  future plans  Patient labs and/or xrays were reviewed  Past records were reviewed. .Counseled regarding diet, exercise and healthy lifestyle          Advised patient to proceed to urgent care, call back or return to office if symptoms worsen/change/persist.  Discussed expected course/resolution/complications of diagnosis in detail with patient. Medication risks/benefits/costs/interactions/alternatives discussed with patient    Wesley Tillman M.D. A total of at least 25 min was spent during this evaluation of which half was spent in counseling and care coordination    This note was created using voice recognition software.   Edits have been made but syntax errors might exist.

## 2020-11-11 ENCOUNTER — VIRTUAL VISIT (OUTPATIENT)
Dept: INTERNAL MEDICINE CLINIC | Age: 70
End: 2020-11-11
Payer: MEDICARE

## 2020-11-11 DIAGNOSIS — M54.6 ACUTE BILATERAL THORACIC BACK PAIN: ICD-10-CM
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
## 2020-11-11 DIAGNOSIS — M54.2 NECK PAIN: Primary | ICD-10-CM

## 2020-11-11 DIAGNOSIS — M54.50 ACUTE BILATERAL LOW BACK PAIN, UNSPECIFIED WHETHER SCIATICA PRESENT: ICD-10-CM

## 2020-11-11 PROCEDURE — 99214 OFFICE O/P EST MOD 30 MIN: CPT | Performed by: INTERNAL MEDICINE

## 2020-11-11 RX ORDER — CYCLOBENZAPRINE HCL 10 MG
5 TABLET ORAL
Qty: 30 TAB | Refills: 0 | Status: SHIPPED | OUTPATIENT
Start: 2020-11-11 | End: 2021-09-24
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
## 2020-11-27 ENCOUNTER — OFFICE VISIT (OUTPATIENT)
Dept: INTERNAL MEDICINE CLINIC | Age: 70
End: 2020-11-27
Payer: MEDICARE

## 2020-11-27 VITALS
DIASTOLIC BLOOD PRESSURE: 79 MMHG | OXYGEN SATURATION: 98 % | SYSTOLIC BLOOD PRESSURE: 150 MMHG | WEIGHT: 196 LBS | TEMPERATURE: 98.6 F | BODY MASS INDEX: 31.5 KG/M2 | HEIGHT: 66 IN | RESPIRATION RATE: 18 BRPM | HEART RATE: 64 BPM

## 2020-11-27 DIAGNOSIS — K59.00 CONSTIPATION, UNSPECIFIED CONSTIPATION TYPE: ICD-10-CM

## 2020-11-27 DIAGNOSIS — Z13.31 SCREENING FOR DEPRESSION: ICD-10-CM

## 2020-11-27 DIAGNOSIS — Z00.00 MEDICARE ANNUAL WELLNESS VISIT, SUBSEQUENT: ICD-10-CM

## 2020-11-27 DIAGNOSIS — M54.42 ACUTE LEFT-SIDED LOW BACK PAIN WITH LEFT-SIDED SCIATICA: ICD-10-CM

## 2020-11-27 DIAGNOSIS — M81.0 OSTEOPOROSIS, UNSPECIFIED OSTEOPOROSIS TYPE, UNSPECIFIED PATHOLOGICAL FRACTURE PRESENCE: ICD-10-CM

## 2020-11-27 DIAGNOSIS — E55.9 VITAMIN D DEFICIENCY: Primary | ICD-10-CM

## 2020-11-27 DIAGNOSIS — Z78.0 POSTMENOPAUSAL: ICD-10-CM

## 2020-11-27 PROCEDURE — G8754 DIAS BP LESS 90: HCPCS | Performed by: INTERNAL MEDICINE

## 2020-11-27 PROCEDURE — 1090F PRES/ABSN URINE INCON ASSESS: CPT | Performed by: INTERNAL MEDICINE

## 2020-11-27 PROCEDURE — G8753 SYS BP > OR = 140: HCPCS | Performed by: INTERNAL MEDICINE

## 2020-11-27 PROCEDURE — G0439 PPPS, SUBSEQ VISIT: HCPCS | Performed by: INTERNAL MEDICINE

## 2020-11-27 PROCEDURE — 1101F PT FALLS ASSESS-DOCD LE1/YR: CPT | Performed by: INTERNAL MEDICINE

## 2020-11-27 PROCEDURE — G8510 SCR DEP NEG, NO PLAN REQD: HCPCS | Performed by: INTERNAL MEDICINE

## 2020-11-27 PROCEDURE — G8427 DOCREV CUR MEDS BY ELIG CLIN: HCPCS | Performed by: INTERNAL MEDICINE

## 2020-11-27 PROCEDURE — 99214 OFFICE O/P EST MOD 30 MIN: CPT | Performed by: INTERNAL MEDICINE

## 2020-11-27 PROCEDURE — G8536 NO DOC ELDER MAL SCRN: HCPCS | Performed by: INTERNAL MEDICINE

## 2020-11-27 PROCEDURE — 3017F COLORECTAL CA SCREEN DOC REV: CPT | Performed by: INTERNAL MEDICINE

## 2020-11-27 PROCEDURE — G8417 CALC BMI ABV UP PARAM F/U: HCPCS | Performed by: INTERNAL MEDICINE

## 2020-11-27 RX ORDER — MONTELUKAST SODIUM 10 MG/1
10 TABLET ORAL
COMMUNITY

## 2020-11-27 RX ORDER — FLUTICASONE FUROATE, UMECLIDINIUM BROMIDE AND VILANTEROL TRIFENATATE 100; 62.5; 25 UG/1; UG/1; UG/1
POWDER RESPIRATORY (INHALATION)
COMMUNITY
Start: 2020-09-09 | End: 2021-09-24

## 2020-11-27 NOTE — PROGRESS NOTES
1. Have you been to the ER, urgent care clinic since your last visit? Hospitalized since your last visit? No    2. Have you seen or consulted any other health care providers outside of the [address] [place] [address] since your last visit? Include any pap smears or colon screening. No     Medication increase/ refill (linzess)    This is the Subsequent Medicare Annual Wellness Exam, performed 12 months or more after the Initial AWV or the last Subsequent AWV    I have reviewed the patient's medical history in detail and updated the computerized patient record. Depression Risk Factor Screening:     3 most recent PHQ Screens 11/27/2020   Little interest or pleasure in doing things Not at all   Feeling down, depressed, irritable, or hopeless Not at all   Total Score PHQ 2 0       Alcohol Risk Screen   Do you average more than 1 drink per night or more than 7 drinks a week:  No    On any one occasion in the past three months have you have had more than 3 drinks containing alcohol:  No        Functional Ability and Level of Safety:   Hearing: Hearing is good. Activities of Daily Living: The home contains: no safety equipment. Patient does total self care     Ambulation: with no difficulty     Fall Risk:  Fall Risk Assessment, last 12 mths 10/29/2020   Able to walk? Yes   Fall in past 12 months?  No   Fall with injury? -   Number of falls in past 12 months -   Fall Risk Score -     Abuse Screen:  Patient is not abused       Cognitive Screening   Has your family/caregiver stated any concerns about your memory: no     Cognitive Screening: not necessary    Assessment/Plan   Education and counseling provided:  Are appropriate based on today's review and evaluation        Health Maintenance Due     Health Maintenance Due   Topic Date Due    Hepatitis C Screening  1950    Colorectal Cancer Screening Combo  06/19/2001    GLAUCOMA SCREENING Q2Y  06/19/2016    Breast Cancer Screen Mammogram  11/16/2017    Medicare Yearly Exam  09/22/2019       Patient Care Team   Patient Care Team:  Subhash Sloan MD as PCP - General (Internal Medicine)  Subhash Sloan MD as PCP - [facility] Provider  Tom Guillaume MD as Physician (Sleep Medicine)    History     Patient Active Problem List   Diagnosis Code    Lipoma D17.9    Essential hypertension with goal blood pressure less than 140/90 I10    Allergic rhinitis J30.9     Past Medical History:   Diagnosis Date    Adverse effect of anesthesia     WOKE UP 1X DURING PROCEDURE    Arthritis     Environmental allergies     GERD (gastroesophageal reflux disease)     Heart murmur     Hypertension     Lipoma 11/23/2015    Nausea & vomiting     ONLY NAUSEA    PUD (peptic ulcer disease)       Past Surgical History:   Procedure Laterality Date    HX GI      COLONOSCOPY    HX GYN      EXPLORATORY LAP    HX OTHER SURGICAL  1988    removed lipoma of head    HX OTHER SURGICAL  1/21/16     LOU for pinched nerve in back    HX OTHER SURGICAL  1/27/16    EXCISE RECURRENT LIPOMA POSTERIOR SCALP    HX RETINAL DETACHMENT REPAIR Left      Current Outpatient Medications   Medication Sig Dispense Refill    Trelegy Ellipta 100-62.5-25 mcg inhaler TAKE 1 PUFF BY MOUTH EVERY DAY      montelukast (SINGULAIR) 10 mg tablet 10 mg.      cyclobenzaprine (FLEXERIL) 10 mg tablet Take 0.5 Tabs by mouth three (3) times daily as needed for Muscle Spasm(s). 30 Tab 0    amLODIPine-Olmesartan (Lesli) 10-20 mg tab TAKE 1 TABLET BY MOUTH EVERY DAY 90 Tab 2    amLODIPine-Olmesartan (Lesli) 10-20 mg tab Take  by mouth. 90 Tab 1    acetaminophen (TYLENOL) 325 mg tablet Take 2 Tabs by mouth every eight (8) hours. 180 Tab 1    linaCLOtide (LINZESS) 145 mcg cap capsule Take 1 Cap by mouth Daily (before breakfast). 30 Cap 3    OTHER       albuterol (PROVENTIL HFA, VENTOLIN HFA, PROAIR HFA) 90 mcg/actuation inhaler Take 1 Puff by inhalation every four (4) hours as needed for Wheezing.  1 Inhaler 2    loratadine (CLARITIN) 10 mg tablet TAKE 1 TABLET BY MOUTH EVERY DAY 90 Tab 0    fexofenadine (ALLEGRA) 60 mg tablet TAKE 1 TAB BY MOUTH TWO (2) TIMES A DAY. 60 Tab 0    fluticasone furoate (ARNUITY ELLIPTA) 200 mcg/actuation dsdv inhaler Take 1 Puff by inhalation daily.  omeprazole (PRILOSEC) 20 mg capsule TAKE ONE CAPSULE BY MOUTH EVERY DAY 30 Cap 1     Allergies   Allergen Reactions    Penicillins Hives and Swelling     SWELLING OF TONGUE       Family History   Problem Relation Age of Onset    Hypertension Mother     Kidney Disease Mother         WAS ON DIALYSIS    Cancer Father         leukemia    Hypertension Sister     Other Brother         BRAIN ANEURYSM    Hypertension Brother     Anesth Problems Neg Hx      Social History     Tobacco Use    Smoking status: Never Smoker    Smokeless tobacco: Never Used   Substance Use Topics    Alcohol use:  Yes     Alcohol/week: 4.0 standard drinks     Types: 4 Glasses of wine per week     Comment: social

## 2020-11-27 NOTE — PROGRESS NOTES
Subjective:      Rylan Campoverde is a 72 y.o. female who presents today for   Chief Complaint   Patient presents with   [facility] Annual Wellness Visit     Pap- oct 2019 normal  She also has seen obgyn Dr. Lydia Haji November 16, 2020 patient says she had pap smear    mammo  [facility] 2020    DEXA  2018 shows osteoporosis  She takes Vit D  Consider Prolia? Suggested by OBGYN    Colon  Within last 2 years- Dr. Melvina Da Silva:  tdap due every 10 years- UTD  Flu   Scheduled for December through her insurance company  Pneumovax 2016  shingrix  declined    BP elevated today  Patient says she ate a lot of salt and seafood yesterday  Patient will check readings for next week and let me know if reading is trending down    Has chronic back pain and requests referral Dr. Roger Burns (ortho)  left sided sciatica    Constipation- taking linzess, may have to try increased dose because still having some episodes of constipation. She is followed closely by gastroenterology  She endorse drinking water, increased fiber with diet  Patient Active Problem List    Diagnosis Date Noted    Essential hypertension with goal blood pressure less than 140/90 05/27/2016    Allergic rhinitis 05/27/2016    Lipoma 11/23/2015     Current Outpatient Medications   Medication Sig Dispense Refill    Trelegy Ellipta 100-62.5-25 mcg inhaler TAKE 1 PUFF BY MOUTH EVERY DAY      montelukast (SINGULAIR) 10 mg tablet 10 mg.      cyclobenzaprine (FLEXERIL) 10 mg tablet Take 0.5 Tabs by mouth three (3) times daily as needed for Muscle Spasm(s). 30 Tab 0    amLODIPine-Olmesartan (Lesli) 10-20 mg tab TAKE 1 TABLET BY MOUTH EVERY DAY 90 Tab 2    amLODIPine-Olmesartan (Lesli) 10-20 mg tab Take  by mouth. 90 Tab 1    acetaminophen (TYLENOL) 325 mg tablet Take 2 Tabs by mouth every eight (8) hours. 180 Tab 1    linaCLOtide (LINZESS) 145 mcg cap capsule Take 1 Cap by mouth Daily (before breakfast).  30 Cap 3    OTHER       albuterol (PROVENTIL HFA, VENTOLIN HFA, PROAIR HFA) 90 mcg/actuation inhaler Take 1 Puff by inhalation every four (4) hours as needed for Wheezing. 1 Inhaler 2    loratadine (CLARITIN) 10 mg tablet TAKE 1 TABLET BY MOUTH EVERY DAY 90 Tab 0    fexofenadine (ALLEGRA) 60 mg tablet TAKE 1 TAB BY MOUTH TWO (2) TIMES A DAY. 60 Tab 0    fluticasone furoate (ARNUITY ELLIPTA) 200 mcg/actuation dsdv inhaler Take 1 Puff by inhalation daily.  omeprazole (PRILOSEC) 20 mg capsule TAKE ONE CAPSULE BY MOUTH EVERY DAY 30 Cap 1     Allergies   Allergen Reactions    Penicillins Hives and Swelling     SWELLING OF TONGUE     Past Medical History:   Diagnosis Date    Adverse effect of anesthesia     WOKE UP 1X DURING PROCEDURE    Arthritis     Environmental allergies     GERD (gastroesophageal reflux disease)     Heart murmur     Hypertension     Lipoma 11/23/2015    Nausea & vomiting     ONLY NAUSEA    PUD (peptic ulcer disease)      Past Surgical History:   Procedure Laterality Date    HX GI      COLONOSCOPY    HX GYN      EXPLORATORY LAP    HX OTHER SURGICAL  1988    removed lipoma of head    HX OTHER SURGICAL  1/21/16     LOU for pinched nerve in back    HX OTHER SURGICAL  1/27/16    EXCISE RECURRENT LIPOMA POSTERIOR SCALP    HX RETINAL DETACHMENT REPAIR Left      Family History   Problem Relation Age of Onset    Hypertension Mother     Kidney Disease Mother         WAS ON DIALYSIS    Cancer Father         leukemia    Hypertension Sister     Other Brother         BRAIN ANEURYSM    Hypertension Brother     Anesth Problems Neg Hx      Social History     Tobacco Use    Smoking status: Never Smoker    Smokeless tobacco: Never Used   Substance Use Topics    Alcohol use: Yes     Alcohol/week: 4.0 standard drinks     Types: 4 Glasses of wine per week     Comment: social        Review of Systems    A comprehensive review of systems was negative except for that written in the HPI.      Objective:     Visit Vitals  BP (!) 150/79   Pulse 64   Temp 98.6 °F (37 °C) (Oral)   Resp 18   Ht 5' 6\" (1.676 m)   Wt 196 lb (88.9 kg)   LMP  (LMP Unknown)   SpO2 98%   BMI 31.64 kg/m²     General:  Alert, cooperative, no distress, appears stated age. Head:  Normocephalic, without obvious abnormality, atraumatic. Eyes:  Conjunctivae/corneas clear. PERRL, EOMs intact. Fundi benign. Ears:  Normal TMs and external ear canals both ears. Neck: Supple, symmetrical, trachea midline, no adenopathy, thyroid: no enlargement/tenderness/nodules, no carotid bruit and no JVD. Back:   Symmetric, no curvature. ROM normal. No CVA tenderness. Lungs:   Clear to auscultation bilaterally. Chest wall:  No tenderness or deformity. Heart:  Regular rate and rhythm, S1, S2 normal, no murmur, click, rub or gallop. Abdomen:   Soft, non-tender. Bowel sounds normal. No masses,  No organomegaly. Extremities: Extremities normal, atraumatic, no cyanosis or edema. Pulses: 2+ and symmetric all extremities. Skin: Skin color, texture, turgor normal. No rashes or lesions. Neurologic: CNII-XII intact. Normal strength, sensation and reflexes throughout. Assessment/Plan:       ICD-10-CM ICD-9-CM    1. Vitamin D deficiency  E55.9 268.9 VITAMIN D, 25 HYDROXY      VITAMIN D, 25 HYDROXY   2. Medicare annual wellness visit, subsequent  A72.39 O86.0 METABOLIC PANEL, COMPREHENSIVE      LIPID PANEL      HEMOGLOBIN A1C WITH EAG      CBC WITH AUTOMATED DIFF      VITAMIN D, 25 HYDROXY      URINALYSIS W/ RFLX MICROSCOPIC      URINALYSIS W/ RFLX MICROSCOPIC      VITAMIN D, 25 HYDROXY      CBC WITH AUTOMATED DIFF      HEMOGLOBIN A1C WITH EAG      LIPID PANEL      METABOLIC PANEL, COMPREHENSIVE   3. Screening for depression  Z13.31 V79.0 DEPRESSION SCREEN ANNUAL   4. Postmenopausal  Z78.0 V49.81 DEXA BONE DENSITY STUDY AXIAL      DEXA BONE DENSITY STUDY AXIAL   5.  Osteoporosis, unspecified osteoporosis type, unspecified pathological fracture presence  M81.0 733.00 DEXA BONE DENSITY STUDY AXIAL    Continue calcium and vit d  Discuss prolia with endocrine      DEXA BONE DENSITY STUDY AXIAL   6. Acute left-sided low back pain with left-sided sciatica  M54.42 724.2 REFERRAL TO ORTHOPEDICS     724.3    7. Constipation- discussed increased dose of Linzess. She will talk to gastroenterologist       Advised her to call back or return to office if symptoms worsen/change/persist.  Discussed expected course/resolution/complications of diagnosis in detail with patient. Medication risks/benefits/costs/interactions/alternatives discussed with patient. She was given an after visit summary which includes diagnoses, current medications, & vitals. She expressed understanding with the diagnosis and plan.

## 2020-11-28 LAB
25(OH)D3 SERPL-MCNC: 24.5 NG/ML (ref 30–100)
ALBUMIN SERPL-MCNC: 4 G/DL (ref 3.5–5)
ALBUMIN/GLOB SERPL: 1.1 {RATIO} (ref 1.1–2.2)
ALP SERPL-CCNC: 247 U/L (ref 45–117)
ALT SERPL-CCNC: 23 U/L (ref 12–78)
ANION GAP SERPL CALC-SCNC: 7 MMOL/L (ref 5–15)
APPEARANCE UR: CLEAR
AST SERPL-CCNC: 22 U/L (ref 15–37)
BASOPHILS # BLD: 0.1 K/UL (ref 0–0.1)
BASOPHILS NFR BLD: 2 % (ref 0–1)
BILIRUB SERPL-MCNC: 0.5 MG/DL (ref 0.2–1)
BILIRUB UR QL: NEGATIVE
BUN SERPL-MCNC: 18 MG/DL (ref 6–20)
BUN/CREAT SERPL: 23 (ref 12–20)
CALCIUM SERPL-MCNC: 9.7 MG/DL (ref 8.5–10.1)
CHLORIDE SERPL-SCNC: 108 MMOL/L (ref 97–108)
CHOLEST SERPL-MCNC: 216 MG/DL
CO2 SERPL-SCNC: 25 MMOL/L (ref 21–32)
COLOR UR: NORMAL
CREAT SERPL-MCNC: 0.77 MG/DL (ref 0.55–1.02)
DIFFERENTIAL METHOD BLD: ABNORMAL
EOSINOPHIL # BLD: 0.3 K/UL (ref 0–0.4)
EOSINOPHIL NFR BLD: 7 % (ref 0–7)
ERYTHROCYTE [DISTWIDTH] IN BLOOD BY AUTOMATED COUNT: 16 % (ref 11.5–14.5)
EST. AVERAGE GLUCOSE BLD GHB EST-MCNC: 120 MG/DL
GLOBULIN SER CALC-MCNC: 3.8 G/DL (ref 2–4)
GLUCOSE SERPL-MCNC: 88 MG/DL (ref 65–100)
GLUCOSE UR STRIP.AUTO-MCNC: NEGATIVE MG/DL
HBA1C MFR BLD: 5.8 % (ref 4–5.6)
HCT VFR BLD AUTO: 37.3 % (ref 35–47)
HDLC SERPL-MCNC: 60 MG/DL
HDLC SERPL: 3.6 {RATIO} (ref 0–5)
HGB BLD-MCNC: 11.7 G/DL (ref 11.5–16)
HGB UR QL STRIP: NEGATIVE
IMM GRANULOCYTES # BLD AUTO: 0 K/UL (ref 0–0.04)
IMM GRANULOCYTES NFR BLD AUTO: 0 % (ref 0–0.5)
KETONES UR QL STRIP.AUTO: NEGATIVE MG/DL
LDLC SERPL CALC-MCNC: 136.4 MG/DL (ref 0–100)
LEUKOCYTE ESTERASE UR QL STRIP.AUTO: NEGATIVE
LIPID PROFILE,FLP: ABNORMAL
LYMPHOCYTES # BLD: 1.6 K/UL (ref 0.8–3.5)
LYMPHOCYTES NFR BLD: 44 % (ref 12–49)
MCH RBC QN AUTO: 27.3 PG (ref 26–34)
MCHC RBC AUTO-ENTMCNC: 31.4 G/DL (ref 30–36.5)
MCV RBC AUTO: 86.9 FL (ref 80–99)
MONOCYTES # BLD: 0.4 K/UL (ref 0–1)
MONOCYTES NFR BLD: 9 % (ref 5–13)
NEUTS SEG # BLD: 1.5 K/UL (ref 1.8–8)
NEUTS SEG NFR BLD: 38 % (ref 32–75)
NITRITE UR QL STRIP.AUTO: NEGATIVE
NRBC # BLD: 0 K/UL (ref 0–0.01)
NRBC BLD-RTO: 0 PER 100 WBC
PH UR STRIP: 5.5 [PH] (ref 5–8)
PLATELET # BLD AUTO: 279 K/UL (ref 150–400)
PMV BLD AUTO: 11.6 FL (ref 8.9–12.9)
POTASSIUM SERPL-SCNC: 4.5 MMOL/L (ref 3.5–5.1)
PROT SERPL-MCNC: 7.8 G/DL (ref 6.4–8.2)
PROT UR STRIP-MCNC: NEGATIVE MG/DL
RBC # BLD AUTO: 4.29 M/UL (ref 3.8–5.2)
RBC MORPH BLD: ABNORMAL
SODIUM SERPL-SCNC: 140 MMOL/L (ref 136–145)
SP GR UR REFRACTOMETRY: 1.01 (ref 1–1.03)
TRIGL SERPL-MCNC: 98 MG/DL (ref ?–150)
UROBILINOGEN UR QL STRIP.AUTO: 0.2 EU/DL (ref 0.2–1)
VLDLC SERPL CALC-MCNC: 19.6 MG/DL
WBC # BLD AUTO: 3.9 K/UL (ref 3.6–11)

## 2020-11-30 NOTE — PROGRESS NOTES
Vit D is low so I sent prescription supplement to her pharmacy    A1c was in prediabetic range    Lipid panel showed moderate elevation, but better than last year

## 2020-12-10 ENCOUNTER — HOSPITAL ENCOUNTER (OUTPATIENT)
Dept: PHYSICAL THERAPY | Age: 70
Discharge: HOME | End: 2020-12-10
Payer: MEDICARE

## 2020-12-10 PROCEDURE — 97161 PT EVAL LOW COMPLEX 20 MIN: CPT | Performed by: PHYSICAL THERAPIST

## 2020-12-10 PROCEDURE — 97110 THERAPEUTIC EXERCISES: CPT | Performed by: PHYSICAL THERAPIST
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
## 2020-12-16 ENCOUNTER — HOSPITAL ENCOUNTER (OUTPATIENT)
Dept: PHYSICAL THERAPY | Age: 70
Discharge: HOME | End: 2020-12-16
Payer: MEDICARE

## 2020-12-16 PROCEDURE — 97140 MANUAL THERAPY 1/> REGIONS: CPT | Performed by: PHYSICAL THERAPIST

## 2020-12-16 PROCEDURE — 97110 THERAPEUTIC EXERCISES: CPT | Performed by: PHYSICAL THERAPIST

## 2020-12-16 PROCEDURE — 97014 ELECTRIC STIMULATION THERAPY: CPT | Performed by: PHYSICAL THERAPIST

## 2020-12-16 NOTE — PROGRESS NOTES
PT DAILY TREATMENT NOTE - [facility] 2-15    Patient Name: Saeid Cabrales  Date:2020  : 1950  [x]  Patient  Verified  Payor: Yovany Pendleton / Plan: [facility] HUMAN MEDICARE CHOICE PPO/PFFS / Product Type: Managed Care Medicare /    In time:1150  Out time:1245p  Total Treatment Time (min): 55  Total Timed Codes (min): 40  1:1 Treatment Time ([facility] only): 35  Visit #: 2    Treatment Area: Low back pain [M54.5]  Neck pain [M54.2]    SUBJECTIVE  Pain Level (0-10 scale)5/10  Any medication changes, allergies to medications, adverse drug reactions, diagnosis change, or new procedure performed?: [x] No    [] Yes (see summary sheet for update)  Subjective functional status/changes:   [] No changes reported  The pt reports more pain on the right side of her low back today. Had to take a muscle relaxer yesterday due to pain and tightness. She reports being compliant with HEP. OBJECTIVE              Modality rationale: decrease pain and increase tissue extensibility to improve the patients ability to perform ADLs    Min Type Additional Details   15  - Estim: -Att   -Unatt        -TENS instruct                  -IFC  -Premod   -NMES                     -Other:  -w/US   -w/ice   -w/heat  Position: supine  Location:right lumbar, CRESCENCIO UT     -  Traction: - Cervical       -Lumbar                       - Prone          -Supine                       -Intermittent   -Continuous Lbs:  - before manual  - after manual  -w/heat     -  Ultrasound: -Continuous   - Pulsed at:                           -1MHz   -3MHz Location:  W/cm2:     - Paraffin         Location:   -w/heat    -  Ice     -  Heat  -  Ice massage Position:supine  Location:lumbar, cervical     -  Laser  -  Other: Position:  Location:        -  Vasopneumatic Device Pressure:       - lo - med - hi   Temperature:       - Skin assessment post-treatment:  -intact -redness- no adverse reaction    -redness  adverse reaction:      30 min Therapeutic Exercise:  []?  See flow sheet :   Rationale: increase ROM and increase strength to improve the patients ability to perform ADLs        10 min Manual Therapy: STM in left sidelying to the right lumbar paraspinals. Rationale: increase ROM and increase strength to improve the patients ability to perform ADLs                                                                  With   []? TE   []? TA   []? neuro   []? other: Patient Education: [x]? Review HEP    []? Progressed/Changed HEP based on:   []? positioning   []? body mechanics   []? transfers   []? heat/ice application    []? other:       Other Objective/Functional Measures:      Pain Level (0-10 scale) post treatment:  0/10      ASSESSMENT/Changes in Function:   Pt tolerated session well and had no pain leaving today. Focused on gentle stretching for Cervical and lumbar today with addition of glut sets. Will progress as she tolerates and pain levels decrease. Responded well to Estim and heat  Patient will continue to benefit from skilled PT services to modify and progress therapeutic interventions, address functional mobility deficits, address ROM deficits, address strength deficits, analyze and address soft tissue restrictions, analyze and cue movement patterns, analyze and modify body mechanics/ergonomics, assess and modify postural abnormalities and instruct in home and community integration to attain remaining goals. [x]  See Plan of Care  []  See progress note/recertification  []  See Discharge Summary         Progress towards goals / Updated goals:  Short Term Goals: To be accomplished in 4 weeks:              Patient will demonstrate 120 degrees of shoulder flexion bilaterally              Patient will report a 50% reduction of stiffness in her neck upon waking up  Long Term Goals:  To be accomplished in 8 weeks:              Patient will improve active cervical side bending and flexion by 10 degrees              Patient will have equivalent UE strength bilaterally as measured by MMT  Frequency / Duration: Patient to be seen 2 times per week for 8 weeks.     Patient/ Caregiver education and instruction: activity modification and exercises    PLAN  []  Upgrade activities as tolerated     []  Continue plan of care  []  Update interventions per flow sheet       []  Discharge due to:_  []  Other:_      Rigoberto Vasquez PT, DPT 12/16/2020

## 2020-12-18 ENCOUNTER — HOSPITAL ENCOUNTER (OUTPATIENT)
Dept: PHYSICAL THERAPY | Age: 70
Discharge: HOME | End: 2020-12-18
Payer: MEDICARE

## 2020-12-18 PROCEDURE — 97014 ELECTRIC STIMULATION THERAPY: CPT

## 2020-12-18 PROCEDURE — 97110 THERAPEUTIC EXERCISES: CPT

## 2020-12-22 ENCOUNTER — APPOINTMENT (OUTPATIENT)
Dept: PHYSICAL THERAPY | Age: 70
End: 2020-12-22
Payer: MEDICARE

## 2020-12-30 ENCOUNTER — APPOINTMENT (OUTPATIENT)
Dept: PHYSICAL THERAPY | Age: 70
End: 2020-12-30
Payer: MEDICARE

## 2021-01-04 ENCOUNTER — HOSPITAL ENCOUNTER (OUTPATIENT)
Dept: PHYSICAL THERAPY | Age: 71
Discharge: HOME | End: 2021-01-04
Payer: MEDICARE

## 2021-01-04 PROCEDURE — 97014 ELECTRIC STIMULATION THERAPY: CPT | Performed by: PHYSICAL THERAPIST

## 2021-01-04 PROCEDURE — 97110 THERAPEUTIC EXERCISES: CPT | Performed by: PHYSICAL THERAPIST

## 2021-01-04 NOTE — PROGRESS NOTES
PT DAILY TREATMENT NOTE - [facility] 2-15    Patient Name: Margie Finch  Date:2021  : 1950  [x]  Patient  Verified  Payor: Airam Masterson / Plan: [facility] HUMAN MEDICARE CHOICE PPO/PFFS / Product Type: Managed Care Medicare /    In time: 6314G  Out time: 1245p  Total Treatment Time (min): 60  Total Timed Codes (min): 45  1:1 Treatment Time ( only): 38  Visit #: 4    Treatment Area: Low back pain [M54.5]  Neck pain [M54.2]    SUBJECTIVE  Pain Level (0-10 scale)0/10  Any medication changes, allergies to medications, adverse drug reactions, diagnosis change, or new procedure performed?: [x] No    [] Yes (see summary sheet for update)  Subjective functional status/changes:   [] No changes reported  Reports she is doing much better, no pain today. She has been working on her exercises and feeling a difference. OBJECTIVE              Modality rationale: decrease pain and increase tissue extensibility to improve the patients ability to perform ADLs    Min Type Additional Details   15  - Estim: -Att   -Unatt        -TENS instruct                  -IFC  -Premod   -NMES                     -Other:  -w/US   -w/ice   -w/heat  Position: supine  Location:right lumbar, CRESCENCIO UT     -  Traction: - Cervical       -Lumbar                       - Prone          -Supine                       -Intermittent   -Continuous Lbs:  - before manual  - after manual  -w/heat     -  Ultrasound: -Continuous   - Pulsed at:                           -1MHz   -3MHz Location:  W/cm2:     - Paraffin         Location:   -w/heat    -  Ice     -  Heat  -  Ice massage Position:supine  Location:lumbar, cervical     -  Laser  -  Other: Position:  Location:        -  Vasopneumatic Device Pressure:       - lo - med - hi   Temperature:       - Skin assessment post-treatment:  -intact -redness- no adverse reaction    -redness - adverse reaction:      45 min Therapeutic Exercise:  []?  See flow sheet :   Rationale: increase ROM and increase strength to improve the patients ability to perform ADLs        -- min Manual Therapy: STM in left sidelying to the right lumbar paraspinals. Rationale: increase ROM and increase strength to improve the patients ability to perform ADLs                                                                  With   []? TE   []? TA   []? neuro   []? other: Patient Education: [x]? Review HEP    []? Progressed/Changed HEP based on:   []? positioning   []? body mechanics   []? transfers   []? heat/ice application    []? other:       Other Objective/Functional Measures:      Pain Level (0-10 scale) post treatment:  0/10      ASSESSMENT/Changes in Function:   Added TA and core stabilization to exercises today. Also progressed to bridges and tolerated well. Patient will continue to benefit from skilled PT services to modify and progress therapeutic interventions, address functional mobility deficits, address ROM deficits, address strength deficits, analyze and address soft tissue restrictions, analyze and cue movement patterns, analyze and modify body mechanics/ergonomics, assess and modify postural abnormalities and instruct in home and community integration to attain remaining goals. [x]  See Plan of Care  []  See progress note/recertification  []  See Discharge Summary         Progress towards goals / Updated goals:  Short Term Goals: To be accomplished in 4 weeks:              Patient will demonstrate 120 degrees of shoulder flexion bilaterallyprogressing              Patient will report a 50% reduction of stiffness in her neck upon waking up- progressing  Long Term Goals:  To be accomplished in 8 weeks:              Patient will improve active cervical side bending and flexion by 10 degrees              Patient will have equivalent UE strength bilaterally as measured by MMT  Frequency / Duration: Patient to be seen 2 times per week for 8 weeks.     Patient/ Caregiver education and instruction: activity modification and exercises    PLAN  [x]  Upgrade activities as tolerated     [x]  Continue plan of care  []  Update interventions per flow sheet       []  Discharge due to:_  []  Other:_      Adithya Sparks PT, DPT 1/4/2021

## 2021-01-06 ENCOUNTER — HOSPITAL ENCOUNTER (OUTPATIENT)
Dept: PHYSICAL THERAPY | Age: 71
Discharge: HOME | End: 2021-01-06
Payer: MEDICARE

## 2021-01-06 PROCEDURE — 97110 THERAPEUTIC EXERCISES: CPT

## 2021-01-06 NOTE — PROGRESS NOTES
PT DAILY TREATMENT NOTE - [facility] 2-15    Patient Name: Rigoberto Barahona  Date:2021  : 1950  [x]  Patient  Verified  Payor: Rod Vázquez / Plan: [facility] [employer] MEDICARE CHOICE PPO/PFFS / Product Type: Managed Care Medicare /    In time:11:40A  Out time: 12:35P  Total Treatment Time (min): 55  Total Timed Codes (min): 55  1:1 Treatment Time ( [address] only): 50   Visit #:  5    Treatment Area: Low back pain [M54.5]  Neck pain [M54.2]    SUBJECTIVE  Pain Level (0-10 scale): 0/10  Any medication changes, allergies to medications, adverse drug reactions, diagnosis change, or new procedure performed?: [x]? No    []? Yes (see summary sheet for update)  Subjective functional status/changes:   []? No changes reported  Pt reported feeling better overall.      OBJECTIVE        55 min Therapeutic Exercise:  [x]? ? See flow sheet :   Rationale: increase ROM and increase strength to improve the patients ability to perform ADLs        -- min Manual Therapy: STM in left sidelying to the right lumbar paraspinals. Rationale: increase ROM and increase strength to improve the patients ability to perform ADLs                                                                  With   []?? TE   []?? TA   []?? neuro   []?? other: Patient Education: [x]? ? Review HEP    []? ? Progressed/Changed HEP based on:   []?? positioning   []? ? body mechanics   []? ? transfers   []? ? heat/ice application    []? ? other:       Other Objective/Functional Measures:      Pain Level (0-10 scale) post treatment:  0/10        ASSESSMENT/Changes in Function:   Pt tolerated session well today. Wants to start back with yoga, water aerobics and gosia classes. Advised pt to begin with chair yoga first will monitor response. Plan to progress to core press outs and walk outs next session.    Patient will continue to benefit from skilled PT services to modify and progress therapeutic interventions, address functional mobility deficits, address ROM deficits, address strength deficits, analyze and address soft tissue restrictions, analyze and cue movement patterns, analyze and modify body mechanics/ergonomics, assess and modify postural abnormalities and instruct in home and community integration to attain remaining goals. [x]? See Plan of Care  []? See progress note/recertification  []? See Discharge Summary         Progress towards goals / Updated goals:  Short Term Goals: To be accomplished in 4 weeks:              Patient will demonstrate 120 degrees of shoulder flexion bilaterallyprogressing              Patient will report a 50% reduction of stiffness in her neck upon waking up- progressing  Long Term Goals: To be accomplished in 8 weeks:              Patient will improve active cervical side bending and flexion by 10 degrees              Patient will have equivalent UE strength bilaterally as measured by MMT  Frequency / Duration: Patient to be seen 2 times per week for 8 weeks.     Patient/ Caregiver education and instruction: activity modification and exercises     PLAN  [x]? Upgrade activities as tolerated     [x]? Continue plan of care  []? Update interventions per flow sheet       []? Discharge due to:_  []?   Other:_           Zaire Morel, PTA, [facility] 1/6/2021

## 2021-01-11 ENCOUNTER — APPOINTMENT (OUTPATIENT)
Dept: PHYSICAL THERAPY | Age: 71
End: 2021-01-11
Payer: MEDICARE

## 2021-01-13 ENCOUNTER — HOSPITAL ENCOUNTER (OUTPATIENT)
Dept: PHYSICAL THERAPY | Age: 71
Discharge: HOME | End: 2021-01-13
Payer: MEDICARE

## 2021-01-13 PROCEDURE — 97140 MANUAL THERAPY 1/> REGIONS: CPT | Performed by: PHYSICAL THERAPIST

## 2021-01-13 PROCEDURE — 97110 THERAPEUTIC EXERCISES: CPT | Performed by: PHYSICAL THERAPIST

## 2021-01-13 NOTE — PROGRESS NOTES
PT DAILY TREATMENT NOTE - [facility] 2-15    Patient Name: Rhianna Perez  Date:2021  : 1950  [x]  Patient  Verified  Payor: Nan Ga / Plan: [facility] [employer] MEDICARE CHOICE PPO/PFFS / Product Type: Managed Care Medicare /    In time:11:58A  Out time: 12:51P  Total Treatment Time (min): 53  Total Timed Codes (min): 53  1:1 Treatment Time ( [address] only): 52  Visit #:  6    Treatment Area: Low back pain [M54.5]  Neck pain [M54.2]    SUBJECTIVE  Pain Level (0-10 scale): 0/10  Any medication changes, allergies to medications, adverse drug reactions, diagnosis change, or new procedure performed?: [x]? No    []? Yes (see summary sheet for update)  Subjective functional status/changes:   []? No changes reported  Pt reported feeling better overall. 90 % improvement in s/s since initial session.      OBJECTIVE        45 min Therapeutic Exercise:  [x]? ? See flow sheet :   Rationale: increase ROM and increase strength to improve the patients ability to perform ADLs        8 min Manual Therapy: STM in prone to CRESCENCIO lumbar paraspinals. Rationale: increase ROM and increase strength to improve the patients ability to perform ADLs                                                                  With   []?? TE   []?? TA   []?? neuro   []?? other: Patient Education: [x]? ? Review HEP    []? ? Progressed/Changed HEP based on:   []?? positioning   []? ? body mechanics   []? ? transfers   []? ? heat/ice application    []? ? other:       Other Objective/Functional Measures:      Pain Level (0-10 scale) post treatment:  0/10        ASSESSMENT/Changes in Function:   Pt reports 90% improvement. Progressing well with ROM, strengthening and core stabilization. Pt is compliant with HEP. Continue to progress  For 2 more session. Plan to D/C if she is doing well.    Patient will continue to benefit from skilled PT services to modify and progress therapeutic interventions, address functional mobility deficits, address ROM deficits, address strength deficits, analyze and address soft tissue restrictions, analyze and cue movement patterns, analyze and modify body mechanics/ergonomics, assess and modify postural abnormalities and instruct in home and community integration to attain remaining goals. [x]? See Plan of Care  []? See progress note/recertification  []? See Discharge Summary         Progress towards goals / Updated goals:  Short Term Goals: To be accomplished in 4 weeks:              Patient will demonstrate 120 degrees of shoulder flexion bilaterallyprogressing              Patient will report a 50% reduction of stiffness in her neck upon waking up- progressing  Long Term Goals: To be accomplished in 8 weeks:              Patient will improve active cervical side bending and flexion by 10 degrees              Patient will have equivalent UE strength bilaterally as measured by MMT  Frequency / Duration: Patient to be seen 2 times per week for 8 weeks.     Patient/ Caregiver education and instruction: activity modification and exercises     PLAN  [x]? Upgrade activities as tolerated     [x]? Continue plan of care  []? Update interventions per flow sheet       []? Discharge due to:_  []?   Other:_           Lillie Zarco, PT, DPT, OPT[facility] 1/13/2021

## 2021-01-18 ENCOUNTER — HOSPITAL ENCOUNTER (OUTPATIENT)
Dept: PHYSICAL THERAPY | Age: 71
Discharge: HOME | End: 2021-01-18
Payer: MEDICARE

## 2021-01-18 PROCEDURE — 97140 MANUAL THERAPY 1/> REGIONS: CPT | Performed by: PHYSICAL THERAPIST

## 2021-01-18 PROCEDURE — 97110 THERAPEUTIC EXERCISES: CPT | Performed by: PHYSICAL THERAPIST

## 2021-01-18 NOTE — PROGRESS NOTES
PT DAILY TREATMENT NOTE - [facility] 2-15    Patient Name: Lorenzo Gave  Date:2021  : 1950  [x]  Patient  Verified  Payor: Ikemallika Stephensbetsy / Plan: [facility] [place] MED[place] CHO[facility] PPO/PFFS / Product Type: Managed Care Medicare /    In time:148p  Out time: 250p  Total Treatment Time (min): 62  Total Timed Codes (min): 52  1:1 Treatment Time ( only): 48  Visit #:  7    Treatment Area: Low back pain [M54.5]  Neck pain [M54.2]    SUBJECTIVE  Pain Level (0-10 scale): 4/10(back)  Any medication changes, allergies to medications, adverse drug reactions, diagnosis change, or new procedure performed?: [x]? No    []? Yes (see summary sheet for update)  Subjective functional status/changes:   []? No changes reported  Pt reports unpacking boxes this weekend and feels she may have carried something up the stairs that was too heavy and her back is hurting more today. Feels sore.      OBJECTIVE     Modality rationale: decrease inflammation, decrease pain and increase tissue extensibility to improve the patients ability to perform daily activities pain free.     Min Type Additional Details       [] Estim: []Att   []Unatt    []TENS instruct                  []IFC  []Premod   []NMES                     []Other:  []w/US   []w/ice   []w/heat  Position:  Location:       []  Traction: [] Cervical       []Lumbar                       [] Prone          []Supine                       []Intermittent   []Continuous Lbs:  [] before manual  [] after manual  []w/heat    []  Ultrasound: []Continuous   [] Pulsed                       at: []1MHz   []3MHz Location:  W/cm2:    [] Paraffin         Location:   []w/heat   10 []  Ice     [x]  Heat  []  Ice massage Position:supine  Location: low back/neck    []  Laser  []  Other: Position:  Location:      []  Vasopneumatic Device Pressure:       [] lo [] med [] hi   Temperature:      [x] Skin assessment post-treatment:  [x]intact []redness- no adverse reaction    []redness - adverse reaction:    42 min Therapeutic Exercise:  [x]? ? See flow sheet :   Rationale: increase ROM and increase strength to improve the patients ability to perform ADLs        10 min Manual Therapy: STM in prone to CRESCENCIO lumbar paraspinals. Rationale: increase ROM and increase strength to improve the patients ability to perform ADLs                                                                  With   []?? TE   []?? TA   []?? neuro   []?? other: Patient Education: [x]? ? Review HEP    []? ? Progressed/Changed HEP based on:   []?? positioning   []? ? body mechanics   []? ? transfers   []? ? heat/ice application    []? ? other:       Other Objective/Functional Measures:      Pain Level (0-10 scale) post treatment:  0/10        ASSESSMENT/Changes in Function:   Pt has some increased soreness in her back this afternoon. Able to perform all exercises without increase pain and felt improvement following manual therapy. Discussed anchoring band in the doorway for home properly. One more session scheduled and will re-assess. Patient will continue to benefit from skilled PT services to modify and progress therapeutic interventions, address functional mobility deficits, address ROM deficits, address strength deficits, analyze and address soft tissue restrictions, analyze and cue movement patterns, analyze and modify body mechanics/ergonomics, assess and modify postural abnormalities and instruct in home and community integration to attain remaining goals. [x]? See Plan of Care  []? See progress note/recertification  []?   See Discharge Summary         Progress towards goals / Updated goals:  Short Term Goals: To be accomplished in 4 weeks:              Patient will demonstrate 120 degrees of shoulder flexion bilaterally MET              Patient will report a 50% reduction of stiffness in her neck upon waking up-MET  Long Term Goals: To be accomplished in 8 weeks:              Patient will improve active cervical side bending and flexion by 10 degrees MET              LGPEBLI will have equivalent UE strength bilaterally as measured by MMT  Frequency / Duration: Patient to be seen 2 times per week for 8 weeks.     Patient/ Caregiver education and instruction: activity modification and exercises     PLAN  [x]? Upgrade activities as tolerated     [x]? Continue plan of care  []? Update interventions per flow sheet       []? Discharge due to:_  []?   Other:_           Dainel Button, PT, DPT, OPTA 1/18/2021

## 2021-01-20 ENCOUNTER — HOSPITAL ENCOUNTER (OUTPATIENT)
Dept: PHYSICAL THERAPY | Age: 71
Discharge: HOME | End: 2021-01-20
Payer: MEDICARE

## 2021-01-20 PROCEDURE — 97140 MANUAL THERAPY 1/> REGIONS: CPT | Performed by: PHYSICAL THERAPIST

## 2021-01-20 PROCEDURE — 97110 THERAPEUTIC EXERCISES: CPT | Performed by: PHYSICAL THERAPIST
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
## 2021-01-20 NOTE — PROGRESS NOTES
PT DAILY TREATMENT NOTE - [facility] 2-15    Patient Name: Jaynie Spurling  Date:2021  : 1950  [x]  Patient  Verified  Payor: Juan Birmingham / Plan: [facility] [employer] MEDICARE CHOICE PPO/PFFS / Product Type: Managed Care Medicare /    In time:1247p  Out time: 135p  Total Treatment Time (min): 48  Total Timed Codes (min): 48  1:1 Treatment Time ( [address] only): 42  Visit #:  8    Treatment Area: Low back pain [M54.5]  Neck pain [M54.2]    SUBJECTIVE  Pain Level (0-10 scale): 4/10(back)  Any medication changes, allergies to medications, adverse drug reactions, diagnosis change, or new procedure performed?: [x]? No    []? Yes (see summary sheet for update)  Subjective functional status/changes:   []? No changes reported  Pt reports feeling much better compared to last session. No pain noted. Has been performing her exercises at home and this is helping.      OBJECTIVE        40 min Therapeutic Exercise:  [x]? ? See flow sheet :   Rationale: increase ROM and increase strength to improve the patients ability to perform ADLs        8 min Manual Therapy: STM in prone to CRESCENCIO lumbar paraspinals. Rationale: increase ROM and increase strength to improve the patients ability to perform ADLs                                                                  With   []?? TE   []?? TA   []?? neuro   []?? other: Patient Education: [x]? ? Review HEP    []? ? Progressed/Changed HEP based on:   []?? positioning   []? ? body mechanics   []? ? transfers   []? ? heat/ice application    []? ? other:       Other Objective/Functional Measures:                                                Cervical AROM:                                                                       R                      L                        Flexion                                     30 (22 initial)                                              Extension                                55 (32)                                              Side Bending 34 (22)             33 (28p!)                   Rotation                                   82( 72)                85   ( 80)      Pain Level (0-10 scale) post treatment:  0/10        ASSESSMENT/Changes in Function:   Pt did well today and feels comfortable transitioning to [facility]. Full cervical ROM with improvement in transfers, daily activities. Pt has MET all LTGS and will be discharged at this time. Patient will continue to benefit from skilled PT services to modify and progress therapeutic interventions, address functional mobility deficits, address ROM deficits, address strength deficits, analyze and address soft tissue restrictions, analyze and cue movement patterns, analyze and modify body mechanics/ergonomics, assess and modify postural abnormalities and instruct in home and community integration to attain remaining goals. [x]? See Plan of Care  []? See progress note/recertification  []? See Discharge Summary         Progress towards goals / Updated goals:  Short Term Goals: To be accomplished in 4 weeks:              Patient will demonstrate 120 degrees of shoulder flexion bilaterally MET              Patient will report a 50% reduction of stiffness in her neck upon waking up-MET  Long Term Goals: To be accomplished in 8 weeks:              Patient will improve active cervical side bending and flexion by 10 degrees MET              Patient will have equivalent UE strength bilaterally as measured by MMT  Frequency / Duration: Patient to be seen 2 times per week for 8 weeks.     Patient/ Caregiver education and instruction: activity modification and exercises     PLAN  [x]? Upgrade activities as tolerated     [x]? Continue plan of care  []? Update interventions per flow sheet       []? Discharge due to:_  []?   Other:_           Kenan Ramos PT, DPT, OPTA 1/20/2021

## 2021-01-26 RX ORDER — ASPIRIN 325 MG
TABLET, DELAYED RELEASE (ENTERIC COATED) ORAL
Qty: 8 CAP | Refills: 0 | Status: SHIPPED | OUTPATIENT
Start: 2021-01-26 | End: 2021-09-24

## 2021-02-01 ENCOUNTER — OFFICE VISIT (OUTPATIENT)
Dept: INTERNAL MEDICINE CLINIC | Age: 71
End: 2021-02-01
Payer: MEDICARE

## 2021-02-01 VITALS
SYSTOLIC BLOOD PRESSURE: 131 MMHG | TEMPERATURE: 98.7 F | WEIGHT: 194 LBS | HEART RATE: 54 BPM | OXYGEN SATURATION: 99 % | HEIGHT: 69 IN | DIASTOLIC BLOOD PRESSURE: 79 MMHG | BODY MASS INDEX: 28.73 KG/M2 | RESPIRATION RATE: 16 BRPM

## 2021-02-01 DIAGNOSIS — K59.09 OTHER CONSTIPATION: ICD-10-CM

## 2021-02-01 DIAGNOSIS — M54.42 ACUTE LEFT-SIDED LOW BACK PAIN WITH LEFT-SIDED SCIATICA: Primary | ICD-10-CM

## 2021-02-01 PROCEDURE — 3017F COLORECTAL CA SCREEN DOC REV: CPT | Performed by: INTERNAL MEDICINE

## 2021-02-01 PROCEDURE — G8417 CALC BMI ABV UP PARAM F/U: HCPCS | Performed by: INTERNAL MEDICINE

## 2021-02-01 PROCEDURE — 1101F PT FALLS ASSESS-DOCD LE1/YR: CPT | Performed by: INTERNAL MEDICINE

## 2021-02-01 PROCEDURE — 99214 OFFICE O/P EST MOD 30 MIN: CPT | Performed by: INTERNAL MEDICINE

## 2021-02-01 PROCEDURE — G8752 SYS BP LESS 140: HCPCS | Performed by: INTERNAL MEDICINE

## 2021-02-01 PROCEDURE — G8432 DEP SCR NOT DOC, RNG: HCPCS | Performed by: INTERNAL MEDICINE

## 2021-02-01 PROCEDURE — G8536 NO DOC ELDER MAL SCRN: HCPCS | Performed by: INTERNAL MEDICINE

## 2021-02-01 PROCEDURE — 1090F PRES/ABSN URINE INCON ASSESS: CPT | Performed by: INTERNAL MEDICINE

## 2021-02-01 PROCEDURE — G8428 CUR MEDS NOT DOCUMENT: HCPCS | Performed by: INTERNAL MEDICINE

## 2021-02-01 PROCEDURE — G8754 DIAS BP LESS 90: HCPCS | Performed by: INTERNAL MEDICINE

## 2021-02-01 PROCEDURE — G8399 PT W/DXA RESULTS DOCUMENT: HCPCS | Performed by: INTERNAL MEDICINE

## 2021-02-01 NOTE — PROGRESS NOTES
Chief Complaint   Patient presents with    Follow-up     Patient is here for a follow up. 1. Have you been to the ER, urgent care clinic since your last visit? Hospitalized since your last visit? No    2. Have you seen or consulted any other health care providers outside of the [address] [place] [address] since your last visit? Include any pap smears or colon screening.  No
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
## 2021-03-03 ENCOUNTER — VIRTUAL VISIT (OUTPATIENT)
Dept: INTERNAL MEDICINE CLINIC | Age: 71
End: 2021-03-03
Payer: MEDICARE

## 2021-03-03 DIAGNOSIS — I10 ESSENTIAL HYPERTENSION WITH GOAL BLOOD PRESSURE LESS THAN 140/90: Primary | ICD-10-CM

## 2021-03-03 PROCEDURE — 99213 OFFICE O/P EST LOW 20 MIN: CPT | Performed by: INTERNAL MEDICINE

## 2021-03-03 RX ORDER — OLMESARTAN MEDOXOMIL 20 MG/1
20 TABLET ORAL DAILY
Qty: 30 TAB | Refills: 3 | Status: SHIPPED | OUTPATIENT
Start: 2021-03-03 | End: 2021-05-27

## 2021-03-03 RX ORDER — AMLODIPINE BESYLATE 10 MG/1
10 TABLET ORAL DAILY
Qty: 30 TAB | Refills: 3 | Status: SHIPPED | OUTPATIENT
Start: 2021-03-03 | End: 2021-05-27

## 2021-03-03 NOTE — PROGRESS NOTES
Netta Barahona is a 72 y.o. female who was seen by synchronous (real-time) audio-video technology on 3/3/2021 for Medication Refill        Assessment & Plan:   Hypertension- refill amlodipine 10 mg po daily and olmesartan 20 mg po daily  Advised to  medication today  If bp does not come down or headache persists patient understands that she will need to be seen at acute care  Subjective:   Patient in today for bp follow up. She has been taking Lesli and was doing well with BP control however when insurance stopped covering it she discontinued it. She has been off of her meds and her BP has been elevated. She also is reporting headaches over the last few days    Prior to Admission medications    Medication Sig Start Date End Date Taking? Authorizing Provider   linaCLOtide (Linzess) 290 mcg cap capsule Take 1 Cap by mouth Daily (before breakfast). 2/1/21   Zaira Ko MD   cholecalciferol (VITAMIN D3) (50,000 UNITS /1250 MCG) capsule TAKE 1 CAPSULE BY MOUTH ONE TIME PER WEEK 1/26/21   Jaylon Schaefer MD   linaCLOtide (Linzess) 290 mcg cap capsule Take 1 Cap by mouth Daily (before breakfast). 12/14/20   Jaylon Schaefer MD   Trelegy Ellipta 100-62.5-25 mcg inhaler TAKE 1 PUFF BY MOUTH EVERY DAY 9/9/20   Provider, Historical   montelukast (SINGULAIR) 10 mg tablet 10 mg.    Provider, Historical   cyclobenzaprine (FLEXERIL) 10 mg tablet Take 0.5 Tabs by mouth three (3) times daily as needed for Muscle Spasm(s). 11/11/20   Jaylon Schaefer MD   amLODIPine-Olmesartan (Lesli) 10-20 mg tab TAKE 1 TABLET BY MOUTH EVERY DAY 10/27/20   Jaylon Schaefer MD   amLODIPine-Olmesartan (Lesli) 10-20 mg tab Take  by mouth.  9/1/20   Jaylon Schaefer MD   loratadine (CLARITIN) 10 mg tablet TAKE 1 TABLET BY MOUTH EVERY DAY 7/19/20   Jaylon Schaefer MD   fexofenadine (ALLEGRA) 60 mg tablet TAKE 1 TAB BY MOUTH TWO (2) TIMES A DAY. 1/3/20   Zaira Ko MD acetaminophen (TYLENOL) 325 mg tablet Take 2 Tabs by mouth every eight (8) hours. 12/2/19   Tesha Schaefer MD   fluticasone furoate (ARNUITY ELLIPTA) 200 mcg/actuation dsdv inhaler Take 1 Puff by inhalation daily. Provider, Historical   linaCLOtide (LINZESS) 145 mcg cap capsule Take 1 Cap by mouth Daily (before breakfast). 6/29/19   Yandel Thapa MD   OTHER     Provider, Historical   albuterol (PROVENTIL HFA, VENTOLIN HFA, PROAIR HFA) 90 mcg/actuation inhaler Take 1 Puff by inhalation every four (4) hours as needed for Wheezing. 1/25/19   Tesha Schaefer MD   omeprazole (PRILOSEC) 20 mg capsule TAKE ONE CAPSULE BY MOUTH EVERY DAY 10/25/17   Tesha Schaefer MD         ROS    Objective:   No flowsheet data found.      [INSTRUCTIONS:  \"[x]\" Indicates a positive item  \"[]\" Indicates a negative item  -- DELETE ALL ITEMS NOT EXAMINED]    Constitutional: [x] Appears well-developed and well-nourished [x] No apparent distress      [] Abnormal -     Mental status: [x] Alert and awake  [x] Oriented to person/place/time [x] Able to follow commands    [] Abnormal -     Eyes:   EOM    [x]  Normal    [] Abnormal -   Sclera  [x]  Normal    [] Abnormal -          Discharge [x]  None visible   [] Abnormal -     HENT: [x] Normocephalic, atraumatic  [] Abnormal -   [x] Mouth/Throat: Mucous membranes are moist    External Ears [x] Normal  [] Abnormal -    Neck: [x] No visualized mass [] Abnormal -     Pulmonary/Chest: [x] Respiratory effort normal   [x] No visualized signs of difficulty breathing or respiratory distress        [] Abnormal -      Musculoskeletal:   [x] Normal gait with no signs of ataxia         [x] Normal range of motion of neck        [] Abnormal -     Neurological:        [x] No Facial Asymmetry (Cranial nerve 7 motor function) (limited exam due to video visit)          [x] No gaze palsy        [] Abnormal -          Skin:        [x] No significant exanthematous lesions or discoloration noted on facial skin         [] Abnormal -            Psychiatric:       [x] Normal Affect [] Abnormal -        [x] No Hallucinations    Other pertinent observable physical exam findings:-        We discussed the expected course, resolution and complications of the diagnosis(es) in detail. Medication risks, benefits, costs, interactions, and alternatives were discussed as indicated. I advised her to contact the office if her condition worsens, changes or fails to improve as anticipated. She expressed understanding with the diagnosis(es) and plan. [facility], was evaluated through a synchronous (real-time) audio-video encounter. The patient (or guardian if applicable) is aware that this is a billable service. Verbal consent to proceed has been obtained within the past 12 months. The visit was conducted pursuant to the emergency declaration under the [address] [place] [address] [facility] authority and the [employer] General Act. Patient identification was verified, and a caregiver was present when appropriate. The patient was located in a state where the provider was credentialed to provide care.       Pat Rayo MD

## 2021-03-03 NOTE — PROGRESS NOTES
James Khan is a 72 y.o. female    Chief Complaint   Patient presents with    Medication Refill     1. Have you been to the ER, urgent care clinic since your last visit? Hospitalized since your last visit? No    2. Have you seen or consulted any other health care providers outside of the [address] [place] [address] since your last visit? Include any pap smears or colon screening.   No

## 2021-03-21 RX ORDER — AMLODIPINE BESYLATE AND OLMESARTAN MEDOXOMIL 10; 20 MG/1; MG/1
TABLET, FILM COATED ORAL
Qty: 90 TAB | Refills: 2 | Status: SHIPPED | OUTPATIENT
Start: 2021-03-21 | End: 2021-03-23

## 2021-03-23 RX ORDER — AMLODIPINE BESYLATE AND OLMESARTAN MEDOXOMIL 10; 20 MG/1; MG/1
TABLET, FILM COATED ORAL
Qty: 30 TAB | Refills: 8 | Status: SHIPPED | OUTPATIENT
Start: 2021-03-23 | End: 2021-09-24 | Stop reason: SDUPTHER

## 2021-03-24 ENCOUNTER — OFFICE VISIT (OUTPATIENT)
Dept: ENDOCRINOLOGY | Age: 71
End: 2021-03-24
Payer: MEDICARE

## 2021-03-24 VITALS
DIASTOLIC BLOOD PRESSURE: 74 MMHG | WEIGHT: 197 LBS | BODY MASS INDEX: 29.18 KG/M2 | OXYGEN SATURATION: 100 % | HEART RATE: 60 BPM | HEIGHT: 69 IN | TEMPERATURE: 97.3 F | SYSTOLIC BLOOD PRESSURE: 144 MMHG | RESPIRATION RATE: 18 BRPM

## 2021-03-24 DIAGNOSIS — M81.0 AGE-RELATED OSTEOPOROSIS WITHOUT CURRENT PATHOLOGICAL FRACTURE: Primary | ICD-10-CM

## 2021-03-24 DIAGNOSIS — E55.9 VITAMIN D DEFICIENCY: ICD-10-CM

## 2021-03-24 DIAGNOSIS — R53.83 FATIGUE, UNSPECIFIED TYPE: ICD-10-CM

## 2021-03-24 DIAGNOSIS — M88.9 PAGET'S DISEASE OF BONE: ICD-10-CM

## 2021-03-24 PROCEDURE — G8417 CALC BMI ABV UP PARAM F/U: HCPCS | Performed by: INTERNAL MEDICINE

## 2021-03-24 PROCEDURE — G8754 DIAS BP LESS 90: HCPCS | Performed by: INTERNAL MEDICINE

## 2021-03-24 PROCEDURE — G8536 NO DOC ELDER MAL SCRN: HCPCS | Performed by: INTERNAL MEDICINE

## 2021-03-24 PROCEDURE — 1101F PT FALLS ASSESS-DOCD LE1/YR: CPT | Performed by: INTERNAL MEDICINE

## 2021-03-24 PROCEDURE — 99204 OFFICE O/P NEW MOD 45 MIN: CPT | Performed by: INTERNAL MEDICINE

## 2021-03-24 PROCEDURE — 3017F COLORECTAL CA SCREEN DOC REV: CPT | Performed by: INTERNAL MEDICINE

## 2021-03-24 PROCEDURE — G8427 DOCREV CUR MEDS BY ELIG CLIN: HCPCS | Performed by: INTERNAL MEDICINE

## 2021-03-24 PROCEDURE — 1090F PRES/ABSN URINE INCON ASSESS: CPT | Performed by: INTERNAL MEDICINE

## 2021-03-24 PROCEDURE — G8753 SYS BP > OR = 140: HCPCS | Performed by: INTERNAL MEDICINE

## 2021-03-24 PROCEDURE — G8510 SCR DEP NEG, NO PLAN REQD: HCPCS | Performed by: INTERNAL MEDICINE

## 2021-03-24 RX ORDER — ERGOCALCIFEROL 1.25 MG/1
50000 CAPSULE ORAL
COMMUNITY
End: 2021-09-24 | Stop reason: SDUPTHER

## 2021-03-24 RX ORDER — ASCORBIC ACID 500 MG
1000 TABLET ORAL DAILY
COMMUNITY

## 2021-03-24 NOTE — PATIENT INSTRUCTIONS
Daily requirement of calcium is 1000 mg - 1200 mg and Vitamin D is 800 - 1000 Units daily (should  preferably be from food sources). Calcium rich food choices are  low fat dairy products, fortified orange juice,broccoli,salmon. Fall precautions  Weight bearing exercises helps. Excess alcohol and smoking weakens the bone and hence should be avoided. Portion calcium (mg) # of servings per week   Milk (skim, 2%, or whole) 1 cup 300    [place] Milk (Silk brand, [place] Breeze Brand)  1 cup 450    Soy milk 1 cup 300    Yogurt  1 cup 350          Tofu with calcium  ½ cup 435    Hard cheese (cheddar, parmesan, emmental, gruyere) 1 oz 240    Soft cheese (camembert, brie, mozzarella) 1 oz 120    Cottage cheese  ½ cup 130    Cream cheese 1oz 180    Calcium supplement or pill 1                 Benefits of osteoporosis medications  Medicines for osteoporosis help prevent bone loss and increase bone density, which decrease the fracturesi. The table below outlines the common osteoporosis drugs, and how much they reduce the risk of fractures. Zolendronic  yearly     Safety and side effects of osteoporosis medications    Common side effects of osteoporosis drugs      Flu-like symptoms: This is uncommon in oral osteoporosis drugs, but about 1 in 20 patients receiving the injectable osteoporosis drugs (reclast and prolia) can have flu-like symptoms for 1-3 days after the injection.  Low calcium (hypocalcemia): This is uncommon in oral osteoporosis drugs, but about 1-2 out of 100 patients receiving injectable osteoporosis drugs (reclast and prolia) can cause low calcium levels in the blood. Rare side effects of osteoporosis drugs  Osteonecrosis of the jaw (ONJ):    0.01% over 10 years. In other words, one out of every 10,000 people to take this medication for 10 years could get ONJ.    ONJ is a rare condition where the bone of the lower or upper jaw becomes exposed (usually because of tooth extractions) and does not heal properly. This is a very rare side effect, and the risk is thought to be primarily for people on high doses of medication in the setting of cancer. Atypical femoral fractures (AFF):    0.5% over 10 years.  In other words 5 out of every 1000 people to take this medication for 10 years could have an atypical femoral fracture.  An atypical femur fracture is called atypical because of the location and condition of the fracture. AFFs start as a weakening of the outer rim of the femur below the hip area. The tiny crack that occurs is a kind of stress fracture, but unlike stress fractures in people who overdo exercise training, this fracture occurs with regular life activities. An AFF is also different from more common osteoporosis fractures that happen after a single injury - like a fall; AFFs develop slowly from repeated, normal activities. In about 2 of the 3 people who get an AFF, there are warning signals that occur over many weeks to months before the bone breaks. If nothing is done about the early warning signs, the crack continues to grow and eventually the thigh bone breaks in two. The warning sign of AFF is an aching pain in the groin or thigh. The risk of side effects from osteoporosis drugs is MUCH smaller than the potential benefit         If untreated, 2,000 out of every 10,000 people with a 20% risk would have an osteoporotic fracture.  If these same 10,000 people were treated with an osteoporosis medication:  o Only 10% of treated people (1,000 out of every 10,000) would have an osteoporotic fracture. In other words, 1000 fewer people would have an osteoporotic fracture.   o 0.50% of the treated people (50 out of every 10,000) would have an atypical femoral fracture.  o 0.01% of the treated people (1 out of every 10,000) could have osteonecrosis of the jaw.      While this is just an example, it clearly shows how unlikely it is someone will have a rare side effect, and how likely it is that someone would benefit from an osteoporosis medication.

## 2021-03-24 NOTE — PROGRESS NOTES
Shirley Hicks is a 72 y.o. female here for   Chief Complaint   Patient presents with    New Patient     referred for Osteoporosis       1. Have you been to the ER, urgent care clinic since your last visit? Hospitalized since your last visit? -n/a    2. Have you seen or consulted any other health care providers outside of the [address] [place] [address] since your last visit?   Include any pap smears or colon screening.-n/a

## 2021-03-24 NOTE — LETTER
3/24/2021 Patient: Maged Murphy YOB: 1950 Date of Visit: 3/24/2021 Christian French MD 
[address] [place] [address],[address] [place] [id number] [address] [employer] Dear Christian French MD, Thank you for referring Ms. Alina Ann to [address] [place] [address] for evaluation. My notes for this consultation are attached. If you have questions, please do not hesitate to call me. I look forward to following your patient along with you. Sincerely, Artur Apple MD

## 2021-03-24 NOTE — PROGRESS NOTES
Trinidad Wiley MD        Patient Information  Date:3/24/2021  Name : Netta Barahona 72 y.o.     YOB: 1950         Referred by: Zaira Ko MD       Chief Complaint   Patient presents with    New Patient     referred for Osteoporosis       History of Present Illness: Netta Barahona is a 72 y.o. female here for evaluation and  management of osteoporosis. she was found to have osteoporosis based on the BMD in 2018, no prior osteoporosis treatment. Paget's disease which was diagnosed 30 years ago, she was on Fosamax 30 years ago which was discontinued due to GI side effects. She was not on any therapy for Paget's. Complains of diffuse arthralgia more pain in the right hip, complains of lower back pain. No history of premature menopause  No loss of height  + GERD/ PUD    Hearing loss    Prior therapy : Fosamax, 30 years ago for Pagets     Dietary calcium Intake:  Minimal amount of dairy in her diet. On calcium and vitamin D supplements. No history of fragility fractures,chronic steroid use, hyperthyroidism. No history of excess alcohol or tobacco abuse. No known history of hypercalcemia,  Kidney stones, family history of kidney stones.       Past Medical History:   Diagnosis Date    Adverse effect of anesthesia     WOKE UP 1X DURING PROCEDURE    Arthritis     Asthma     Environmental allergies     GERD (gastroesophageal reflux disease)     Heart murmur     Hypertension     Lipoma 11/23/2015    Nausea & vomiting     ONLY NAUSEA    PUD (peptic ulcer disease)      Past Surgical History:   Procedure Laterality Date    HX GI      COLONOSCOPY    HX GYN      EXPLORATORY LAP    HX OTHER SURGICAL  1988    removed lipoma of head    HX OTHER SURGICAL  1/21/16     LOU for pinched nerve in back    HX OTHER SURGICAL  1/27/16    EXCISE RECURRENT LIPOMA POSTERIOR SCALP    HX RETINAL DETACHMENT REPAIR Left      Current Outpatient Medications   Medication Sig    zinc 50 mg tab tablet Take 50 mg by mouth daily.  ascorbic acid, vitamin C, (Vitamin C) 500 mg tablet Take 1,000 mg by mouth daily.  ergocalciferol (Vitamin D2) 1,250 mcg (50,000 unit) capsule Take 50,000 Units by mouth every seven (7) days.  amLODIPine (NORVASC) 10 mg tablet Take 1 Tab by mouth daily.  olmesartan (BENICAR) 20 mg tablet Take 1 Tab by mouth daily.  cholecalciferol (VITAMIN D3) (50,000 UNITS /1250 MCG) capsule TAKE 1 CAPSULE BY MOUTH ONE TIME PER WEEK    Trelegy Ellipta 100-62.5-25 mcg inhaler TAKE 1 PUFF BY MOUTH EVERY DAY    montelukast (SINGULAIR) 10 mg tablet 10 mg.    acetaminophen (TYLENOL) 325 mg tablet Take 2 Tabs by mouth every eight (8) hours.  linaCLOtide (LINZESS) 145 mcg cap capsule Take 1 Cap by mouth Daily (before breakfast).  OTHER Beet root  Indications: Black seed oil    Lesli 10-20 mg tab TAKE 1 TABLET BY MOUTH EVERY DAY    linaCLOtide (Linzess) 290 mcg cap capsule Take 1 Cap by mouth Daily (before breakfast).  linaCLOtide (Linzess) 290 mcg cap capsule Take 1 Cap by mouth Daily (before breakfast).  cyclobenzaprine (FLEXERIL) 10 mg tablet Take 0.5 Tabs by mouth three (3) times daily as needed for Muscle Spasm(s).  amLODIPine-Olmesartan (Lesli) 10-20 mg tab TAKE 1 TABLET BY MOUTH EVERY DAY    amLODIPine-Olmesartan (Lesli) 10-20 mg tab Take  by mouth.  loratadine (CLARITIN) 10 mg tablet TAKE 1 TABLET BY MOUTH EVERY DAY    fexofenadine (ALLEGRA) 60 mg tablet TAKE 1 TAB BY MOUTH TWO (2) TIMES A DAY.  fluticasone furoate (ARNUITY ELLIPTA) 200 mcg/actuation dsdv inhaler Take 1 Puff by inhalation daily.  albuterol (PROVENTIL HFA, VENTOLIN HFA, PROAIR HFA) 90 mcg/actuation inhaler Take 1 Puff by inhalation every four (4) hours as needed for Wheezing.  omeprazole (PRILOSEC) 20 mg capsule TAKE ONE CAPSULE BY MOUTH EVERY DAY     No current facility-administered medications for this visit.       Allergies Allergen Reactions    Penicillins Hives and Swelling     SWELLING OF TONGUE         Review of Systems:  All 10 systems  reviewed and are negative other than mentioned in HPI    Physical Examination:  Blood pressure (!) 144/74, pulse 60, temperature 97.3 °F (36.3 °C), temperature source Oral, resp. rate 18, height 5' 9\" (1.753 m), weight 197 lb (89.4 kg), SpO2 100 %. Body mass index is 29.09 kg/m². - General: pleasant, no distress, good eye contact  - HEENT: no exopthalmos, no periorbital edema, no scleral/conjunctival injection, EOMI, no lid lag or stare  - Neck: supple, no thyromegaly, no supraclavicular or dorsocervical fat pads  - Cardiovascular: regular, normal rate, normal S1 and S2,   - Respiratory: clear to auscultation bilaterally  -   - Musculoskeletal: no proximal muscle weakness in upper or lower extremities  - Integumentary:  no rashes, no edema  - Neurological: Alert and oriented x3, no tremors  - Psychiatric: normal mood and affect    Data Reviewed:     Lab Results   Component Value Date/Time    Vitamin D 25-Hydroxy 24.5 (L) 11/27/2020 12:00 PM       Lab Results   Component Value Date/Time    Calcium 9.7 11/27/2020 12:00 PM     Lab Results   Component Value Date/Time    GFR est non-AA >60 11/27/2020 12:00 PM    GFR est AA >60 11/27/2020 12:00 PM    Creatinine 0.77 11/27/2020 12:00 PM    BUN 18 11/27/2020 12:00 PM    Sodium 140 11/27/2020 12:00 PM    Potassium 4.5 11/27/2020 12:00 PM    Chloride 108 11/27/2020 12:00 PM    CO2 25 11/27/2020 12:00 PM       Assessment/Plan:     1. Age-related osteoporosis without current pathological fracture    2. Paget's disease of bone    3. Vitamin D deficiency    4. Fatigue, unspecified type        Osteoporosis: Severe osteoporosis based on the T score < -3 in the spine, no history of fragility fractures    Will do basic labs to screen for secondary causes .   She could not tolerate oral bisphosphonates, history of GERD, PUD  Given Paget's she cannot take anabolic's-Forteo or Tymlos  Discussed about IV Reclast, side effects and benefits    Daily requirement of calcium is 1000 mg - 1200 mg and Vitamin D is 1000 - 2000 Units daily (should  preferably be from food sources). Calcium rich food choices are  low fat dairy products, fortified orange juice,broccoli,salmon. Fall precautions. Weight bearing exercises helps. Excess alcohol and smoking weakens the bone and hence should be avoided. Paget's disease: Bone scan to assess the extent of involvement. MRI spine showed right pelvic involvement several years ago  IV Reclast will help Paget's      Vitamin D deficiency      Thank you for allowing me to participate in the care of this patient. Nichole Barajas MD    Patient Emely Niece verbalized understanding      Patient Instructions     Daily requirement of calcium is 1000 mg - 1200 mg and Vitamin D is 800 - 1000 Units daily (should  preferably be from food sources). Calcium rich food choices are  low fat dairy products, fortified orange juice,broccoli,salmon. Fall precautions  Weight bearing exercises helps. Excess alcohol and smoking weakens the bone and hence should be avoided. Portion calcium (mg) # of servings per week   Milk (skim, 2%, or whole) 1 cup 300    [place] Milk (Silk brand, [place] Breeze Brand)  1 cup 450    Soy milk 1 cup 300    Yogurt  1 cup 350          Tofu with calcium  ½ cup 435    Hard cheese (cheddar, parmesan, emmental, gruyere) 1 oz 240    Soft cheese (camembert, brie, mozzarella) 1 oz 120    Cottage cheese  ½ cup 130    Cream cheese 1oz 180    Calcium supplement or pill 1                 Benefits of osteoporosis medications  Medicines for osteoporosis help prevent bone loss and increase bone density, which decrease the fracturesi. The table below outlines the common osteoporosis drugs, and how much they reduce the risk of fractures.     Zolendronic  yearly     Safety and side effects of osteoporosis medications    Common side effects of osteoporosis drugs      Flu-like symptoms: This is uncommon in oral osteoporosis drugs, but about 1 in 20 patients receiving the injectable osteoporosis drugs (reclast and prolia) can have flu-like symptoms for 1-3 days after the injection.  Low calcium (hypocalcemia): This is uncommon in oral osteoporosis drugs, but about 1-2 out of 100 patients receiving injectable osteoporosis drugs (reclast and prolia) can cause low calcium levels in the blood. Rare side effects of osteoporosis drugs  Osteonecrosis of the jaw (ONJ):    0.01% over 10 years. In other words, one out of every 10,000 people to take this medication for 10 years could get ONJ.  ONJ is a rare condition where the bone of the lower or upper jaw becomes exposed (usually because of tooth extractions) and does not heal properly. This is a very rare side effect, and the risk is thought to be primarily for people on high doses of medication in the setting of cancer. Atypical femoral fractures (AFF):    0.5% over 10 years.  In other words 5 out of every 1000 people to take this medication for 10 years could have an atypical femoral fracture.  An atypical femur fracture is called atypical because of the location and condition of the fracture. AFFs start as a weakening of the outer rim of the femur below the hip area. The tiny crack that occurs is a kind of stress fracture, but unlike stress fractures in people who overdo exercise training, this fracture occurs with regular life activities. An AFF is also different from more common osteoporosis fractures that happen after a single injury - like a fall; AFFs develop slowly from repeated, normal activities. In about 2 of the 3 people who get an AFF, there are warning signals that occur over many weeks to months before the bone breaks. If nothing is done about the early warning signs, the crack continues to grow and eventually the thigh bone breaks in two.  The warning sign of AFF is an aching pain in the groin or thigh. The risk of side effects from osteoporosis drugs is MUCH smaller than the potential benefit         If untreated, 2,000 out of every 10,000 people with a 20% risk would have an osteoporotic fracture.  If these same 10,000 people were treated with an osteoporosis medication:  o Only 10% of treated people (1,000 out of every 10,000) would have an osteoporotic fracture. In other words, 1000 fewer people would have an osteoporotic fracture.   o 0.50% of the treated people (50 out of every 10,000) would have an atypical femoral fracture.  o 0.01% of the treated people (1 out of every 10,000) could have osteonecrosis of the jaw. While this is just an example, it clearly shows how unlikely it is someone will have a rare side effect, and how likely it is that someone would benefit from an osteoporosis medication. Follow-up and Dispositions    · Return in about 6 months (around 9/24/2021). Voice-recognition software was used to generate this report, which may result in some phonetic-based errors in the grammar and contents. Even though attempts were made to correct all the mistakes, some may have been missed and remained in the body of the report.

## 2021-03-26 LAB
25(OH)D3 SERPL-MCNC: 50.9 NG/ML (ref 30–100)
ALBUMIN SERPL-MCNC: 4.2 G/DL (ref 3.5–5)
ALBUMIN/GLOB SERPL: 1.1 {RATIO} (ref 1.1–2.2)
ALP SERPL-CCNC: 252 U/L (ref 45–117)
ALT SERPL-CCNC: 28 U/L (ref 12–78)
ANION GAP SERPL CALC-SCNC: 9 MMOL/L (ref 5–15)
AST SERPL-CCNC: 20 U/L (ref 15–37)
BILIRUB SERPL-MCNC: 0.4 MG/DL (ref 0.2–1)
BUN SERPL-MCNC: 18 MG/DL (ref 6–20)
BUN/CREAT SERPL: 24 (ref 12–20)
CALCIUM SERPL-MCNC: 10 MG/DL (ref 8.5–10.1)
CALCIUM SERPL-MCNC: 9.8 MG/DL (ref 8.5–10.1)
CHLORIDE SERPL-SCNC: 108 MMOL/L (ref 97–108)
CO2 SERPL-SCNC: 25 MMOL/L (ref 21–32)
CREAT SERPL-MCNC: 0.74 MG/DL (ref 0.55–1.02)
GLOBULIN SER CALC-MCNC: 3.9 G/DL (ref 2–4)
GLUCOSE SERPL-MCNC: 91 MG/DL (ref 65–100)
POTASSIUM SERPL-SCNC: 4.3 MMOL/L (ref 3.5–5.1)
PROT SERPL-MCNC: 8.1 G/DL (ref 6.4–8.2)
PTH-INTACT SERPL-MCNC: 29.7 PG/ML (ref 18.4–88)
SODIUM SERPL-SCNC: 142 MMOL/L (ref 136–145)
TSH SERPL DL<=0.05 MIU/L-ACNC: 4.58 UIU/ML (ref 0.36–3.74)

## 2021-03-28 LAB — COLLAGEN CTX SERPL-MCNC: 724 PG/ML

## 2021-04-06 RX ORDER — ZOLEDRONIC ACID 5 MG/100ML
5 INJECTION, SOLUTION INTRAVENOUS ONCE
Status: COMPLETED | OUTPATIENT
Start: 2021-04-13 | End: 2021-04-13

## 2021-04-13 ENCOUNTER — HOSPITAL ENCOUNTER (OUTPATIENT)
Dept: INFUSION THERAPY | Age: 71
Discharge: HOME | End: 2021-04-13
Payer: MEDICARE

## 2021-04-13 VITALS
DIASTOLIC BLOOD PRESSURE: 68 MMHG | BODY MASS INDEX: 29.39 KG/M2 | HEART RATE: 60 BPM | SYSTOLIC BLOOD PRESSURE: 127 MMHG | WEIGHT: 199 LBS | TEMPERATURE: 97.3 F | RESPIRATION RATE: 16 BRPM

## 2021-04-13 PROCEDURE — 74011250636 HC RX REV CODE- 250/636: Performed by: INTERNAL MEDICINE

## 2021-04-13 PROCEDURE — 96374 THER/PROPH/DIAG INJ IV PUSH: CPT

## 2021-04-13 RX ADMIN — ZOLEDRONIC ACID 5 MG: 5 INJECTION, SOLUTION INTRAVENOUS at 13:51

## 2021-04-13 NOTE — PROGRESS NOTES
OUTPATIENT INFUSION CENTER    DISCHARGE INSTRUCTIONS FOR:  RECLAST (ZOLEDRONIC ACID):    1.  Be sure to inform your Dentist that you are taking this drug prior to invasive dental procedures. You should avoid major dental work for a while after you are treated with this medicine. Report any signs/symptoms of jaw pain to your physician immediately. 2.  Your doctor may order lab testing before each yearly dose of Reclast to check your calcium and kidney function. Your doctor may also prescribe Vitamin D and Calcium supplements. 3.  Make sure your doctor knows if you are taking fluid pills, arthritis medicines or antibiotics,  or if you have a history of problems with your gums, mouth or teeth. 4.  Drink some extra fluids during the 12-hour period before and after you receive Reclast.  Report any changes in urinary patterns (example: a decrease in how often you urinate). Also report rapid weight gain, swelling of the hands, ankles or feet, unusual tiredness or weakness or unusual bleeding or bruising. 5.  You may resume your normal activities after your infusion. Some people may have mild flu-like side effects from Reclast, especially with the first dose. These side effects are less common with subsequent doses. These may include:    Mild nausea, diarrhea, constipation or upset stomach; Red or irritated eyes; redness or itching at IV site;  Mild skin rash, mild numbness, tingling, or burning in extremities; Headache, dizziness, trouble sleeping, or mild muscle or joint pain, which can be relieved with a mild pain reliever such as Tyenol or Ibuprofen as directed by your physician;  Nasal congestion, runny nose, sneezing. 6.  Signs/Symptoms of an allergic reaction may require immediate medical attention. These are rare, but may include one or more of the following:     Skin - Swelling, blistering, weeping, crusting, rash, itching or;   Wheezing, cough, sore throat, chest tightness, chest pain or shortness of breath, irregular heart beat;  Swelling of the face, eyelids, lips, tongue, or throat; severe dry mouth; Severe red (bloodshot), itchy, swollen, watery or painful eyes;  Stomach pain, loss of appetite, nausea, vomiting, or bloody diarrhea;  Severe headache, sleepiness or changes in personality;  Numbness, tingling or pain around the mouth, teeth, or jaw. Contact your physician if you have questions or concerns, or experience any of the above symptoms.     [facility], Signature: ______________________________ 4/13/2021  Eriberto Valdovinos

## 2021-04-13 NOTE — PROGRESS NOTES
Outpatient Infusion Center Short Visit Progress Note    Patient admitted to [facility] for [address] ambulatory in stable condition. Assessment completed. No new concerns voiced. Discharge instructions given. Covid Screening  1. Do you have any symptoms of COVID-19? SOB, coughing, fever, or generally not feeling well ? no  2. Have you been exposed to COVID-19 recently? no  3. Have you had any recent contact with family/friend that has a pending COVID test? no    Vital Signs:  Visit Vitals  /72   Pulse 68   Temp 97.3 °F (36.3 °C)   Resp 16   Wt 90.3 kg (199 lb)   LMP  (LMP Unknown)   Breastfeeding No   BMI 29.39 kg/m²     Left arm PIV with positive blood return. Lab Results:  Drawn 3/24/21    Medications:  Medications Administered     zoledronic acid (RECLAST) IVPB 5 mg     Admin Date  04/13/2021 Action  Given Dose  5 mg Rate  400 mL/hr Route  IntraVENous Administered By  Codie Combs              Patient tolerated treatment well. Patient discharged from [place] [address] ambulatory in no distress. Patient aware of next appointment.     Kevin Rivera RN    Future Appointments   Date Time Provider Emeterio Saab   9/24/2021 11:30 AM Joselito Landry MD CDE [facility]

## 2021-04-19 ENCOUNTER — DOCUMENTATION ONLY (OUTPATIENT)
Dept: ENDOCRINOLOGY | Age: 71
End: 2021-04-19

## 2021-04-19 ENCOUNTER — VIRTUAL VISIT (OUTPATIENT)
Dept: ENDOCRINOLOGY | Age: 71
End: 2021-04-19
Payer: MEDICARE

## 2021-04-19 DIAGNOSIS — M79.10 MYALGIA: Primary | ICD-10-CM

## 2021-04-19 PROCEDURE — 99422 OL DIG E/M SVC 11-20 MIN: CPT | Performed by: INTERNAL MEDICINE

## 2021-05-27 RX ORDER — OLMESARTAN MEDOXOMIL 20 MG/1
TABLET ORAL
Qty: 90 TABLET | Refills: 1 | Status: SHIPPED | OUTPATIENT
Start: 2021-05-27 | End: 2022-02-03

## 2021-05-27 RX ORDER — AMLODIPINE BESYLATE 10 MG/1
TABLET ORAL
Qty: 90 TABLET | Refills: 1 | Status: SHIPPED | OUTPATIENT
Start: 2021-05-27 | End: 2022-02-03

## 2021-06-02 ENCOUNTER — VIRTUAL VISIT (OUTPATIENT)
Dept: INTERNAL MEDICINE CLINIC | Age: 71
End: 2021-06-02
Payer: MEDICARE

## 2021-06-02 DIAGNOSIS — Z71.85 VACCINE COUNSELING: Primary | ICD-10-CM

## 2021-06-02 PROCEDURE — 99442 PR PHYS/QHP TELEPHONE EVALUATION 11-20 MIN: CPT | Performed by: INTERNAL MEDICINE

## 2021-06-02 PROCEDURE — 1090F PRES/ABSN URINE INCON ASSESS: CPT | Performed by: INTERNAL MEDICINE

## 2021-06-02 PROCEDURE — 3017F COLORECTAL CA SCREEN DOC REV: CPT | Performed by: INTERNAL MEDICINE

## 2021-06-02 PROCEDURE — 1101F PT FALLS ASSESS-DOCD LE1/YR: CPT | Performed by: INTERNAL MEDICINE

## 2021-06-02 PROCEDURE — G8432 DEP SCR NOT DOC, RNG: HCPCS | Performed by: INTERNAL MEDICINE

## 2021-06-02 PROCEDURE — G8756 NO BP MEASURE DOC: HCPCS | Performed by: INTERNAL MEDICINE

## 2021-06-02 PROCEDURE — G8428 CUR MEDS NOT DOCUMENT: HCPCS | Performed by: INTERNAL MEDICINE

## 2021-06-02 PROCEDURE — G8399 PT W/DXA RESULTS DOCUMENT: HCPCS | Performed by: INTERNAL MEDICINE

## 2021-06-02 NOTE — PROGRESS NOTES
Chris Mishra is a 72 y.o. female, evaluated via audio-only technology on 6/2/2021 for No chief complaint on file. .    Assessment & Plan:   Diagnoses and all orders for this visit:    1. Vaccine counseling    patient with no significant allergies other than Penicillin, she does not take immunosuppressants,  chronic medical issues are stable, no residual symptoms from her likely medication reaction  Advised patient to take vaccine. patricia or Keyur Bacon   She will set up visit  I told her to contact [facility] or [place] [facility]      12  Subjective:   Patient had infusion done April 13 took Reclast- sees endocrine Dr. Cordero Bodily  Unfortunately patient had very poor reactions. Started feeling better around mid MAY  She wants to schedule COVID vaccine but informed by endocrine that she needs clearance    Has osteoporosis  Has Pagets  Has Vit D deficiency    Prior to Admission medications    Medication Sig Start Date End Date Taking? Authorizing Provider   amLODIPine (NORVASC) 10 mg tablet TAKE 1 TABLET BY MOUTH EVERY DAY 5/27/21   Heron Schaefer MD   olmesartan (BENICAR) 20 mg tablet TAKE 1 TABLET BY MOUTH EVERY DAY 5/27/21   Heron Schaefer MD   zinc 50 mg tab tablet Take 50 mg by mouth daily. Provider, Historical   ascorbic acid, vitamin C, (Vitamin C) 500 mg tablet Take 1,000 mg by mouth daily. Provider, Historical   ergocalciferol (Vitamin D2) 1,250 mcg (50,000 unit) capsule Take 50,000 Units by mouth every seven (7) days. Provider, Historical   Lesli 10-20 mg tab TAKE 1 TABLET BY MOUTH EVERY DAY 3/23/21   Heron Schaefer MD   linaCLOtide (Linzess) 290 mcg cap capsule Take 1 Cap by mouth Daily (before breakfast). 2/1/21   Ree Fernandes MD   cholecalciferol (VITAMIN D3) (50,000 UNITS /1250 MCG) capsule TAKE 1 CAPSULE BY MOUTH ONE TIME PER WEEK 1/26/21   Heron Schaefer MD   linaCLOtide (Linzess) 290 mcg cap capsule Take 1 Cap by mouth Daily (before breakfast).  12/14/20 Gemma Tompkins MD   Trelegy Ellipta 100-62.5-25 mcg inhaler TAKE 1 PUFF BY MOUTH EVERY DAY 9/9/20   Provider, Historical   montelukast (SINGULAIR) 10 mg tablet 10 mg.    Provider, Historical   cyclobenzaprine (FLEXERIL) 10 mg tablet Take 0.5 Tabs by mouth three (3) times daily as needed for Muscle Spasm(s). 11/11/20   Kim Schaefer MD   amLODIPine-Olmesartan (Lesli) 10-20 mg tab TAKE 1 TABLET BY MOUTH EVERY DAY 10/27/20   Kim Schaefer MD   amLODIPine-Olmesartan (Lesli) 10-20 mg tab Take  by mouth. 9/1/20   Kim Schaefer MD   loratadine (CLARITIN) 10 mg tablet TAKE 1 TABLET BY MOUTH EVERY DAY 7/19/20   Kim Schaefer MD   fexofenadine (ALLEGRA) 60 mg tablet TAKE 1 TAB BY MOUTH TWO (2) TIMES A DAY. 1/3/20   Kim Schaefer MD   acetaminophen (TYLENOL) 325 mg tablet Take 2 Tabs by mouth every eight (8) hours. 12/2/19   Kim Schaefer MD   fluticasone furoate (ARNUITY ELLIPTA) 200 mcg/actuation dsdv inhaler Take 1 Puff by inhalation daily. Provider, Historical   linaCLOtide (LINZESS) 145 mcg cap capsule Take 1 Cap by mouth Daily (before breakfast). 6/29/19   Kim Schaefer MD   OTHER Beet root  Indications: Black seed oil    Provider, Historical   albuterol (PROVENTIL HFA, VENTOLIN HFA, PROAIR HFA) 90 mcg/actuation inhaler Take 1 Puff by inhalation every four (4) hours as needed for Wheezing. 1/25/19   Kim Schaefer MD   omeprazole (PRILOSEC) 20 mg capsule TAKE ONE CAPSULE BY MOUTH EVERY DAY 10/25/17   Kim Schaefer MD         Review of Systems   Constitutional: Negative for weight loss. Eyes: Negative for blurred vision. Respiratory: Negative for shortness of breath. Cardiovascular: Negative for chest pain and leg swelling. Genitourinary: Negative for frequency and urgency. Musculoskeletal: Negative for joint pain. Neurological: Negative for headaches. No flowsheet data found.      Halie Funk Ann, who was evaluated through a patient-initiated, synchronous (real-time) audio only encounter, and/or her healthcare decision maker, is aware that it is a billable service, with coverage as determined by her insurance carrier. She provided verbal consent to proceed: Yes. She has not had a related appointment within my department in the past 7 days or scheduled within the next 24 hours.       Total Time: minutes: 11-20 minutes    Jovanna Loving MD

## 2021-06-02 NOTE — PROGRESS NOTES
Yan Harper is a 72 y.o. female who was seen by synchronous (real-time) audio-video technology on 6/2/2021 for No chief complaint on file. Assessment & Plan:  
{A/P PLUS DISPO PJXT:97575} {time statement optional (Optional):14258} Subjective:  
 
 
Prior to Admission medications Medication Sig Start Date End Date Taking? Authorizing Provider  
amLODIPine (NORVASC) 10 mg tablet TAKE 1 TABLET BY MOUTH EVERY DAY 5/27/21   Jania Schaefer MD  
olmesartan (BENICAR) 20 mg tablet TAKE 1 TABLET BY MOUTH EVERY DAY 5/27/21   Jania Schaefer MD  
zinc 50 mg tab tablet Take 50 mg by mouth daily. Provider, Historical  
ascorbic acid, vitamin C, (Vitamin C) 500 mg tablet Take 1,000 mg by mouth daily. Provider, Historical  
ergocalciferol (Vitamin D2) 1,250 mcg (50,000 unit) capsule Take 50,000 Units by mouth every seven (7) days. Provider, Historical  
Lesli 10-20 mg tab TAKE 1 TABLET BY MOUTH EVERY DAY 3/23/21   Jania Schaefer MD  
linaCLOtide (Linzess) 290 mcg cap capsule Take 1 Cap by mouth Daily (before breakfast). 2/1/21   Rick Manning MD  
cholecalciferol (VITAMIN D3) (50,000 UNITS /1250 MCG) capsule TAKE 1 CAPSULE BY MOUTH ONE TIME PER WEEK 1/26/21   Jania Schaefer MD  
linaCLOtide (Linzess) 290 mcg cap capsule Take 1 Cap by mouth Daily (before breakfast). 12/14/20   Jania Schaefer MD  
Trelegy Ellipta 100-62.5-25 mcg inhaler TAKE 1 PUFF BY MOUTH EVERY DAY 9/9/20   Provider, Historical  
montelukast (SINGULAIR) 10 mg tablet 10 mg.    Provider, Historical  
cyclobenzaprine (FLEXERIL) 10 mg tablet Take 0.5 Tabs by mouth three (3) times daily as needed for Muscle Spasm(s). 11/11/20   Jania Schaefer MD  
amLODIPine-Olmesartan (Lesli) 10-20 mg tab TAKE 1 TABLET BY MOUTH EVERY DAY 10/27/20   Jnaia Schaefer MD  
amLODIPine-Olmesartan (Lesli) 10-20 mg tab Take  by mouth.  9/1/20   Jania Schaefer MD  
loratadine (CLARITIN) 10 mg tablet TAKE 1 TABLET BY MOUTH EVERY DAY 7/19/20   Jania Schaefer MD  
fexofenadine (ALLEGRA) 60 mg tablet TAKE 1 TAB BY MOUTH TWO (2) TIMES A DAY. 1/3/20   Jania Schaefer MD  
acetaminophen (TYLENOL) 325 mg tablet Take 2 Tabs by mouth every eight (8) hours. 12/2/19   Jania Schaefer MD  
fluticasone furoate (ARNUITY ELLIPTA) 200 mcg/actuation dsdv inhaler Take 1 Puff by inhalation daily. Provider, Historical  
linaCLOtide (LINZESS) 145 mcg cap capsule Take 1 Cap by mouth Daily (before breakfast). 6/29/19   Jania Schaefer MD  
OTHER Beet root  Indications: Black seed oil    Provider, Historical  
albuterol (PROVENTIL HFA, VENTOLIN HFA, PROAIR HFA) 90 mcg/actuation inhaler Take 1 Puff by inhalation every four (4) hours as needed for Wheezing. 1/25/19   Jania Schaefer MD  
omeprazole (PRILOSEC) 20 mg capsule TAKE ONE CAPSULE BY MOUTH EVERY DAY 10/25/17   Jania Schaefer MD  
 
{History SmartLink choices - disappears if left unselected (Optional):22625} ROS Objective: No flowsheet data found. [INSTRUCTIONS:  \"[x]\" Indicates a positive item  \"[]\" Indicates a negative item  -- DELETE ALL ITEMS NOT EXAMINED] Constitutional: [x] Appears well-developed and well-nourished [x] No apparent distress   
  [] Abnormal - Mental status: [x] Alert and awake  [x] Oriented to person/place/time [x] Able to follow commands   
[] Abnormal - Eyes:   EOM    [x]  Normal    [] Abnormal -  
Sclera  [x]  Normal    [] Abnormal - 
        Discharge [x]  None visible   [] Abnormal - HENT: [x] Normocephalic, atraumatic  [] Abnormal -  
[x] Mouth/Throat: Mucous membranes are moist 
 
External Ears [x] Normal  [] Abnormal - Neck: [x] No visualized mass [] Abnormal - Pulmonary/Chest: [x] Respiratory effort normal   [x] No visualized signs of difficulty breathing or respiratory distress 
      [] Abnormal - Musculoskeletal:   [x] Normal gait with no signs of ataxia [x] Normal range of motion of neck [] Abnormal -  
 
Neurological:        [x] No Facial Asymmetry (Cranial nerve 7 motor function) (limited exam due to video visit) [x] No gaze palsy  
     [] Abnormal -   
     
Skin:        [x] No significant exanthematous lesions or discoloration noted on facial skin   
     [] Abnormal - Psychiatric:       [x] Normal Affect [] Abnormal -  
     [x] No Hallucinations Other pertinent observable physical exam findings:- 
 
 
 
We discussed the expected course, resolution and complications of the diagnosis(es) in detail. Medication risks, benefits, costs, interactions, and alternatives were discussed as indicated. I advised her to contact the office if her condition worsens, changes or fails to improve as anticipated. She expressed understanding with the diagnosis(es) and plan. [facility], was evaluated through a synchronous (real-time) audio-video encounter. The patient (or guardian if applicable) is aware that this is a billable service. Verbal consent to proceed has been obtained within the past 12 months. The visit was conducted pursuant to the emergency declaration under the [address] [place] [address] authority and the [employer] and [employer] Act. Patient identification was verified, and a caregiver was present when appropriate. The patient was located in a state where the provider was credentialed to provide care.  
 
 
sAhok Hinton MD

## 2021-09-24 ENCOUNTER — OFFICE VISIT (OUTPATIENT)
Dept: ENDOCRINOLOGY | Age: 71
End: 2021-09-24
Payer: MEDICARE

## 2021-09-24 VITALS
TEMPERATURE: 97.7 F | SYSTOLIC BLOOD PRESSURE: 129 MMHG | WEIGHT: 207 LBS | HEIGHT: 69 IN | DIASTOLIC BLOOD PRESSURE: 68 MMHG | OXYGEN SATURATION: 99 % | HEART RATE: 64 BPM | RESPIRATION RATE: 16 BRPM | BODY MASS INDEX: 30.66 KG/M2

## 2021-09-24 DIAGNOSIS — M81.0 AGE-RELATED OSTEOPOROSIS WITHOUT CURRENT PATHOLOGICAL FRACTURE: ICD-10-CM

## 2021-09-24 DIAGNOSIS — E55.9 VITAMIN D DEFICIENCY: ICD-10-CM

## 2021-09-24 DIAGNOSIS — M88.9 PAGET'S DISEASE OF BONE: Primary | ICD-10-CM

## 2021-09-24 LAB
25(OH)D3 SERPL-MCNC: 31.6 NG/ML (ref 30–100)
ALBUMIN SERPL-MCNC: 3.7 G/DL (ref 3.5–5)
ALBUMIN/GLOB SERPL: 0.9 {RATIO} (ref 1.1–2.2)
ALP SERPL-CCNC: 80 U/L (ref 45–117)
ALT SERPL-CCNC: 25 U/L (ref 12–78)
ANION GAP SERPL CALC-SCNC: 3 MMOL/L (ref 5–15)
AST SERPL-CCNC: 16 U/L (ref 15–37)
BILIRUB SERPL-MCNC: 0.6 MG/DL (ref 0.2–1)
BUN SERPL-MCNC: 19 MG/DL (ref 6–20)
BUN/CREAT SERPL: 23 (ref 12–20)
CALCIUM SERPL-MCNC: 9.9 MG/DL (ref 8.5–10.1)
CHLORIDE SERPL-SCNC: 107 MMOL/L (ref 97–108)
CO2 SERPL-SCNC: 28 MMOL/L (ref 21–32)
CREAT SERPL-MCNC: 0.81 MG/DL (ref 0.55–1.02)
GLOBULIN SER CALC-MCNC: 4.2 G/DL (ref 2–4)
GLUCOSE SERPL-MCNC: 89 MG/DL (ref 65–100)
POTASSIUM SERPL-SCNC: 4.5 MMOL/L (ref 3.5–5.1)
PROT SERPL-MCNC: 7.9 G/DL (ref 6.4–8.2)
SODIUM SERPL-SCNC: 138 MMOL/L (ref 136–145)

## 2021-09-24 PROCEDURE — G8510 SCR DEP NEG, NO PLAN REQD: HCPCS | Performed by: INTERNAL MEDICINE

## 2021-09-24 PROCEDURE — G8417 CALC BMI ABV UP PARAM F/U: HCPCS | Performed by: INTERNAL MEDICINE

## 2021-09-24 PROCEDURE — 3017F COLORECTAL CA SCREEN DOC REV: CPT | Performed by: INTERNAL MEDICINE

## 2021-09-24 PROCEDURE — G8536 NO DOC ELDER MAL SCRN: HCPCS | Performed by: INTERNAL MEDICINE

## 2021-09-24 PROCEDURE — G8754 DIAS BP LESS 90: HCPCS | Performed by: INTERNAL MEDICINE

## 2021-09-24 PROCEDURE — G8427 DOCREV CUR MEDS BY ELIG CLIN: HCPCS | Performed by: INTERNAL MEDICINE

## 2021-09-24 PROCEDURE — G8752 SYS BP LESS 140: HCPCS | Performed by: INTERNAL MEDICINE

## 2021-09-24 PROCEDURE — 1101F PT FALLS ASSESS-DOCD LE1/YR: CPT | Performed by: INTERNAL MEDICINE

## 2021-09-24 PROCEDURE — 99214 OFFICE O/P EST MOD 30 MIN: CPT | Performed by: INTERNAL MEDICINE

## 2021-09-24 PROCEDURE — 1090F PRES/ABSN URINE INCON ASSESS: CPT | Performed by: INTERNAL MEDICINE

## 2021-09-24 RX ORDER — CHOLECALCIFEROL (VITAMIN D3) 50 MCG
CAPSULE ORAL
COMMUNITY

## 2021-09-24 RX ORDER — BISMUTH SUBSALICYLATE 262 MG
1 TABLET,CHEWABLE ORAL DAILY
COMMUNITY

## 2021-09-24 RX ORDER — CHOLECALCIFEROL (VITAMIN D3) 125 MCG
CAPSULE ORAL
COMMUNITY

## 2021-09-24 NOTE — PROGRESS NOTES
Amrit Garcia is a 80 y.o. female here for   Chief Complaint   Patient presents with    Osteoporosis       1. Have you been to the ER, urgent care clinic since your last visit? Hospitalized since your last visit? -no    2. Have you seen or consulted any other health care providers outside of the [address] [place] [address] since your last visit?   Include any pap smears or colon screening.-no

## 2021-09-24 NOTE — PROGRESS NOTES
Adeline Patten MD        Patient Information  Date:9/24/2021  Name : Cinda Bright 80 y.o.     YOB: 1950         Referred by: Adeola Salas MD       Chief Complaint   Patient presents with    Osteoporosis       History of Present Illness: Cinda Bright is a 80 y.o. female here for follow-up of Paget's and osteoporosis    she was found to have osteoporosis based on the BMD in 2018,   Paget's disease which was diagnosed 30 years ago, she was on Fosamax 30 years ago which was discontinued due to GI side effects. Complains of diffuse arthralgia more pain in the right hip, complains of lower back pain. No history of premature menopause  No loss of height  + GERD/ PUD    Hearing loss    Prior therapy : Fosamax, 30 years ago for Pagets     Dietary calcium Intake:  Minimal amount of dairy in her diet. On calcium and vitamin D supplements. No history of fragility fractures,chronic steroid use, hyperthyroidism. No history of excess alcohol or tobacco abuse. No known history of hypercalcemia,  Kidney stones, family history of kidney stones.       Past Medical History:   Diagnosis Date    Adverse effect of anesthesia     WOKE UP 1X DURING PROCEDURE    Arthritis     Asthma     Environmental allergies     GERD (gastroesophageal reflux disease)     Heart murmur     Hypertension     Lipoma 11/23/2015    Nausea & vomiting     ONLY NAUSEA    PUD (peptic ulcer disease)      Past Surgical History:   Procedure Laterality Date    HX GI      COLONOSCOPY    HX GYN      EXPLORATORY LAP    HX OTHER SURGICAL  1988    removed lipoma of head    HX OTHER SURGICAL  1/21/16     LOU for pinched nerve in back    HX OTHER SURGICAL  1/27/16    EXCISE RECURRENT LIPOMA POSTERIOR SCALP    HX RETINAL DETACHMENT REPAIR Left      Current Outpatient Medications   Medication Sig    cholecalciferol, vitamin D3, (Vitamin D3) 50 mcg (2,000 unit) tab Take  by mouth.  elderberry fruit (ELDERBERRY PO) Take 1,000 mg by mouth daily.  multivitamin (ONE A DAY) tablet Take 1 Tablet by mouth daily.  B.infantis-B.ani-B.long-B.bifi (Probiotic 4X) 10-15 mg TbEC Take  by mouth.  COLLAGEN by Does Not Apply route daily.  amLODIPine (NORVASC) 10 mg tablet TAKE 1 TABLET BY MOUTH EVERY DAY    olmesartan (BENICAR) 20 mg tablet TAKE 1 TABLET BY MOUTH EVERY DAY    zinc 50 mg tab tablet Take 50 mg by mouth daily.  ascorbic acid, vitamin C, (Vitamin C) 500 mg tablet Take 1,000 mg by mouth daily.  montelukast (SINGULAIR) 10 mg tablet 10 mg.    fexofenadine (ALLEGRA) 60 mg tablet TAKE 1 TAB BY MOUTH TWO (2) TIMES A DAY. (Patient taking differently: Take 180 mg by mouth daily.)    linaCLOtide (LINZESS) 145 mcg cap capsule Take 1 Cap by mouth Daily (before breakfast). (Patient taking differently: Take 145 mcg by mouth as needed.)    albuterol (PROVENTIL HFA, VENTOLIN HFA, PROAIR HFA) 90 mcg/actuation inhaler Take 1 Puff by inhalation every four (4) hours as needed for Wheezing.  linaCLOtide (Linzess) 290 mcg cap capsule Take 1 Cap by mouth Daily (before breakfast). (Patient not taking: Reported on 9/24/2021)    cholecalciferol (VITAMIN D3) (50,000 UNITS /1250 MCG) capsule TAKE 1 CAPSULE BY MOUTH ONE TIME PER WEEK (Patient not taking: Reported on 9/24/2021)    Trelegy Ellipta 100-62.5-25 mcg inhaler TAKE 1 PUFF BY MOUTH EVERY DAY (Patient not taking: Reported on 9/24/2021)    cyclobenzaprine (FLEXERIL) 10 mg tablet Take 0.5 Tabs by mouth three (3) times daily as needed for Muscle Spasm(s). (Patient not taking: Reported on 9/24/2021)    amLODIPine-Olmesartan (Lesli) 10-20 mg tab TAKE 1 TABLET BY MOUTH EVERY DAY (Patient not taking: Reported on 9/24/2021)    loratadine (CLARITIN) 10 mg tablet TAKE 1 TABLET BY MOUTH EVERY DAY (Patient not taking: Reported on 9/24/2021)    acetaminophen (TYLENOL) 325 mg tablet Take 2 Tabs by mouth every eight (8) hours. (Patient not taking: Reported on 9/24/2021)    fluticasone furoate (ARNUITY ELLIPTA) 200 mcg/actuation dsdv inhaler Take 1 Puff by inhalation daily. (Patient not taking: Reported on 9/24/2021)    OTHER Beet root  Indications: Black seed oil (Patient not taking: Reported on 9/24/2021)    omeprazole (PRILOSEC) 20 mg capsule TAKE ONE CAPSULE BY MOUTH EVERY DAY (Patient not taking: Reported on 9/24/2021)     No current facility-administered medications for this visit. Allergies   Allergen Reactions    Penicillins Hives and Swelling     SWELLING OF TONGUE         Review of Systems:  All 10 systems  reviewed and are negative other than mentioned in HPI    Physical Examination:  Blood pressure 129/68, pulse 64, temperature 97.7 °F (36.5 °C), temperature source Temporal, resp. rate 16, height 5' 9\" (1.753 m), weight 207 lb (93.9 kg), SpO2 99 %. Body mass index is 30.57 kg/m².   - General: pleasant, no distress, good eye contact  - HEENT: no exopthalmos, no periorbital edema, no scleral/conjunctival injection, EOMI, no lid lag or stare  - Neck: supple, no thyromegaly, no supraclavicular or dorsocervical fat pads  - Cardiovascular: regular, normal rate, normal S1 and S2,   - Respiratory: clear to auscultation bilaterally  -   - Musculoskeletal: no proximal muscle weakness in upper or lower extremities  - Integumentary:  no rashes, no edema  - Neurological: Alert and oriented x3, no tremors  - Psychiatric: normal mood and affect    Data Reviewed:     Lab Results   Component Value Date/Time    Vitamin D 25-Hydroxy 50.9 03/24/2021 11:31 AM       Lab Results   Component Value Date/Time    Calcium 9.8 03/24/2021 11:31 AM    Calcium 10.0 03/24/2021 11:31 AM     Lab Results   Component Value Date/Time    GFR est non-AA >60 03/24/2021 11:31 AM    GFR est AA >60 03/24/2021 11:31 AM    Creatinine 0.74 03/24/2021 11:31 AM    BUN 18 03/24/2021 11:31 AM    Sodium 142 03/24/2021 11:31 AM    Potassium 4.3 03/24/2021 11:31 AM Chloride 108 03/24/2021 11:31 AM    CO2 25 03/24/2021 11:31 AM    PTH, Intact 29.7 03/24/2021 11:31 AM       Assessment/Plan:     1. Paget's disease of bone    2. Vitamin D deficiency    3. Age-related osteoporosis without current pathological fracture        Osteoporosis: Severe osteoporosis based on the T score < -3 in the spine, no history of fragility fractures  Reclast 2021 first dose, had flulike symptoms which lasted for more than a week  She could not tolerate oral bisphosphonates, history of GERD, PUD  Discussed about IV bisphosphonates versus Prolia, pros and cons discussed. Some patients may not have any symptoms with the second Reclast, could pretreat  Given Paget's she cannot take anabolic's-Forteo or Tymlos  Fall precautions. Weight bearing exercises helps. Excess alcohol and smoking weakens the bone and hence should be avoided. Paget's disease: Bone scan to assess the extent of involvement was ordered which was not completed. MRI spine showed right pelvic involvement several years ago  S/p IV Reclast April 2021, had flulike symptoms. She could not tolerate oral bisphosphonates. Bisphosphonates are better for Paget's. Usually 1 dose is sufficient and most of the situations. Vitamin D deficiency      Thank you for allowing me to participate in the care of this patient. Whit Arguello MD    Patient Rolan Klinefelter verbalized understanding      Patient Instructions   I have ordered scan/test and if you do not hear from the hospital in 3- 4 business days  please call the number [phone] to speak with the scheduling team directly. Follow-up and Dispositions    · Return in about 6 months (around 3/24/2022) for labs before next visit and follow up. Voice-recognition software was used to generate this report, which may result in some phonetic-based errors in the grammar and contents.   Even though attempts were made to correct all the mistakes, some may have been missed and remained in the body of the report.

## 2021-09-24 NOTE — PATIENT INSTRUCTIONS
I have ordered scan/test and if you do not hear from the hospital in 3- 4 business days  please call the number [phone] to speak with the scheduling team directly. Benefits of osteoporosis medications  Medicines for osteoporosis help prevent bone loss and increase bone density, which decrease the fracturesi. The table below outlines the common osteoporosis drugs, and how much they reduce the risk of fractures. Safety and side effects of osteoporosis medications    Common side effects of osteoporosis drugs      Flu-like symptoms: This is uncommon in oral oseoporosis drugs, but about 1 in 20 patients receiving the injectable osteoporosis drugs (reclast and prolia) can have flu-like symptoms for 1-3 days after the injection.  Low calcium (hypocalcemia): This is uncommon in oral osteoporosis drugs, but about 1-2 out of 100 patients receiving injectable osteoporosis drugs (reclast and prolia) can cause low calcium levels in the blood. Rare side effects of osteoporosis drugs  Osteonecrosis of the jaw (ONJ):    0.01% over 10 years. In other words, one out of every 10,000 people to take this medication for 10 years could get ONJ.  ONJ is a rare condition where the bone of the lower or upper jaw becomes exposed (usually because of tooth extractions) and does not heal properly. This is a very rare side effect, and the risk is thought to be primarily for people on high doses of medication in the setting of cancer. Atypical femoral fractures (AFF):    0.5% over 10 years.  In other words 5 out of every 1000 people to take this medication for 10 years could have an atypical femoral fracture.  An atypical femur fracture is called atypical because of the location and condition of the fracture. AFFs start as a weakening of the outer rim of the femur below the hip area.  The tiny crack that occurs is a kind of stress fracture, but unlike stress fractures in people who overdo exercise training, this fracture occurs with regular life activities. An AFF is also different from more common osteoporosis fractures that happen after a single injury - like a fall; AFFs develop slowly from repeated, normal activities. In about 2 of the 3 people who get an AFF, there are warning signals that occur over many weeks to months before the bone breaks. If nothing is done about the early warning signs, the crack continues to grow and eventually the thigh bone breaks in two. The warning sign of AFF is an aching pain in the groin or thigh. The risk of side effects from osteoporosis drugs is MUCH smaller than the potential benefit         If untreated, 2,000 out of every 10,000 people with a 20% risk would have an osteoporotic fracture.  If these same 10,000 people were treated with an osteoporosis medication:  o Only 10% of treated people (1,000 out of every 10,000) would have an osteoporotic fracture. In other words, 1000 fewer people would have an osteoporotic fracture.   o 0.50% of the treated people (50 out of every 10,000) would have an atypical femoral fracture.  o 0.01% of the treated people (1 out of every 10,000) could have osteonecrosis of the jaw. While this is just an example, it clearly shows how unlikely it is someone will have a rare side effect, and how likely it is that someone would benefit from an osteoporosis medication.

## 2021-09-24 NOTE — LETTER
9/25/2021    Patient: Luz Garzon   YOB: 1950   Date of Visit: 9/24/2021     Saima Whitman, [address] [facility]    Dear Saima Whitman MD,      Thank you for referring Ms. Alina Ann to [id number] [address] for evaluation. My notes for this consultation are attached. If you have questions, please do not hesitate to call me. I look forward to following your patient along with you.       Sincerely,    Dania Joyce MD

## 2021-11-22 ENCOUNTER — HOSPITAL ENCOUNTER (OUTPATIENT)
Dept: MAMMOGRAPHY | Age: 71
Discharge: HOME | End: 2021-11-22
Attending: INTERNAL MEDICINE
Payer: MEDICARE

## 2021-11-22 DIAGNOSIS — M81.0 AGE-RELATED OSTEOPOROSIS WITHOUT CURRENT PATHOLOGICAL FRACTURE: ICD-10-CM

## 2021-11-22 PROCEDURE — 77080 DXA BONE DENSITY AXIAL: CPT

## 2021-12-03 ENCOUNTER — OFFICE VISIT (OUTPATIENT)
Dept: INTERNAL MEDICINE CLINIC | Age: 71
End: 2021-12-03
Payer: MEDICARE

## 2021-12-03 VITALS
HEART RATE: 63 BPM | SYSTOLIC BLOOD PRESSURE: 155 MMHG | DIASTOLIC BLOOD PRESSURE: 80 MMHG | BODY MASS INDEX: 30.66 KG/M2 | OXYGEN SATURATION: 97 % | WEIGHT: 207 LBS | TEMPERATURE: 97.9 F | RESPIRATION RATE: 18 BRPM | HEIGHT: 69 IN

## 2021-12-03 DIAGNOSIS — I10 ESSENTIAL HYPERTENSION WITH GOAL BLOOD PRESSURE LESS THAN 140/90: ICD-10-CM

## 2021-12-03 DIAGNOSIS — Z13.31 SCREENING FOR DEPRESSION: ICD-10-CM

## 2021-12-03 DIAGNOSIS — Z00.00 MEDICARE ANNUAL WELLNESS VISIT, SUBSEQUENT: ICD-10-CM

## 2021-12-03 DIAGNOSIS — Z12.31 SCREENING MAMMOGRAM, ENCOUNTER FOR: Primary | ICD-10-CM

## 2021-12-03 DIAGNOSIS — Z13.39 SCREENING FOR ALCOHOLISM: ICD-10-CM

## 2021-12-03 DIAGNOSIS — G56.03 BILATERAL CARPAL TUNNEL SYNDROME: ICD-10-CM

## 2021-12-03 LAB
APPEARANCE UR: CLEAR
BASOPHILS # BLD: 0.1 K/UL (ref 0–0.1)
BASOPHILS NFR BLD: 2 % (ref 0–1)
BILIRUB UR QL: NEGATIVE
CHOLEST SERPL-MCNC: 277 MG/DL
COLOR UR: ABNORMAL
COMMENT, HOLDF: NORMAL
DIFFERENTIAL METHOD BLD: ABNORMAL
EOSINOPHIL # BLD: 0.2 K/UL (ref 0–0.4)
EOSINOPHIL NFR BLD: 5 % (ref 0–7)
ERYTHROCYTE [DISTWIDTH] IN BLOOD BY AUTOMATED COUNT: 16.8 % (ref 11.5–14.5)
EST. AVERAGE GLUCOSE BLD GHB EST-MCNC: 105 MG/DL
GLUCOSE UR STRIP.AUTO-MCNC: NEGATIVE MG/DL
HBA1C MFR BLD: 5.3 % (ref 4–5.6)
HCT VFR BLD AUTO: 37.9 % (ref 35–47)
HDLC SERPL-MCNC: 86 MG/DL
HDLC SERPL: 3.2 {RATIO} (ref 0–5)
HGB BLD-MCNC: 11.9 G/DL (ref 11.5–16)
HGB UR QL STRIP: NEGATIVE
IMM GRANULOCYTES # BLD AUTO: 0 K/UL (ref 0–0.04)
IMM GRANULOCYTES NFR BLD AUTO: 0 % (ref 0–0.5)
KETONES UR QL STRIP.AUTO: ABNORMAL MG/DL
LDLC SERPL CALC-MCNC: 173.6 MG/DL (ref 0–100)
LEUKOCYTE ESTERASE UR QL STRIP.AUTO: NEGATIVE
LYMPHOCYTES # BLD: 1.7 K/UL (ref 0.8–3.5)
LYMPHOCYTES NFR BLD: 35 % (ref 12–49)
MCH RBC QN AUTO: 28.2 PG (ref 26–34)
MCHC RBC AUTO-ENTMCNC: 31.4 G/DL (ref 30–36.5)
MCV RBC AUTO: 89.8 FL (ref 80–99)
MONOCYTES # BLD: 0.5 K/UL (ref 0–1)
MONOCYTES NFR BLD: 11 % (ref 5–13)
NEUTS SEG # BLD: 2.3 K/UL (ref 1.8–8)
NEUTS SEG NFR BLD: 47 % (ref 32–75)
NITRITE UR QL STRIP.AUTO: NEGATIVE
NRBC # BLD: 0 K/UL (ref 0–0.01)
NRBC BLD-RTO: 0 PER 100 WBC
PH UR STRIP: 6 [PH] (ref 5–8)
PLATELET # BLD AUTO: 288 K/UL (ref 150–400)
PMV BLD AUTO: 10.7 FL (ref 8.9–12.9)
PROT UR STRIP-MCNC: NEGATIVE MG/DL
RBC # BLD AUTO: 4.22 M/UL (ref 3.8–5.2)
SAMPLES BEING HELD,HOLD: NORMAL
SP GR UR REFRACTOMETRY: 1.02 (ref 1–1.03)
TRIGL SERPL-MCNC: 87 MG/DL (ref ?–150)
UROBILINOGEN UR QL STRIP.AUTO: 0.2 EU/DL (ref 0.2–1)
VLDLC SERPL CALC-MCNC: 17.4 MG/DL
WBC # BLD AUTO: 4.9 K/UL (ref 3.6–11)

## 2021-12-03 PROCEDURE — G8754 DIAS BP LESS 90: HCPCS | Performed by: INTERNAL MEDICINE

## 2021-12-03 PROCEDURE — G9899 SCRN MAM PERF RSLTS DOC: HCPCS | Performed by: INTERNAL MEDICINE

## 2021-12-03 PROCEDURE — 99213 OFFICE O/P EST LOW 20 MIN: CPT | Performed by: INTERNAL MEDICINE

## 2021-12-03 PROCEDURE — G8399 PT W/DXA RESULTS DOCUMENT: HCPCS | Performed by: INTERNAL MEDICINE

## 2021-12-03 PROCEDURE — 3017F COLORECTAL CA SCREEN DOC REV: CPT | Performed by: INTERNAL MEDICINE

## 2021-12-03 PROCEDURE — G8427 DOCREV CUR MEDS BY ELIG CLIN: HCPCS | Performed by: INTERNAL MEDICINE

## 2021-12-03 PROCEDURE — G8417 CALC BMI ABV UP PARAM F/U: HCPCS | Performed by: INTERNAL MEDICINE

## 2021-12-03 PROCEDURE — G0439 PPPS, SUBSEQ VISIT: HCPCS | Performed by: INTERNAL MEDICINE

## 2021-12-03 PROCEDURE — G8753 SYS BP > OR = 140: HCPCS | Performed by: INTERNAL MEDICINE

## 2021-12-03 PROCEDURE — 1101F PT FALLS ASSESS-DOCD LE1/YR: CPT | Performed by: INTERNAL MEDICINE

## 2021-12-03 PROCEDURE — G8510 SCR DEP NEG, NO PLAN REQD: HCPCS | Performed by: INTERNAL MEDICINE

## 2021-12-03 PROCEDURE — G8536 NO DOC ELDER MAL SCRN: HCPCS | Performed by: INTERNAL MEDICINE

## 2021-12-03 NOTE — PATIENT INSTRUCTIONS
Medicare Wellness Visit, Female     The best way to live healthy is to have a lifestyle where you eat a well-balanced diet, exercise regularly, limit alcohol use, and quit all forms of tobacco/nicotine, if applicable. Regular preventive services are another way to keep healthy. Preventive services (vaccines, screening tests, monitoring & exams) can help personalize your care plan, which helps you manage your own care. Screening tests can find health problems at the earliest stages, when they are easiest to treat. [facility] follows the current, evidence-based guidelines published by the [facility] Randal Contreras ([facility]) when recommending preventive services for our patients. Because we follow these guidelines, sometimes recommendations change over time as research supports it. (For example, mammograms used to be recommended annually. Even though Medicare will still pay for an annual mammogram, the newer guidelines recommend a mammogram every two years for women of average risk). Of course, you and your doctor may decide to screen more often for some diseases, based on your risk and your co-morbidities (chronic disease you are already diagnosed with). Preventive services for you include:  - Medicare offers their members a free annual wellness visit, which is time for you and your primary care provider to discuss and plan for your preventive service needs. Take advantage of this benefit every year!  -All adults over the age of 72 should receive the recommended pneumonia vaccines. Current USPSTF guidelines recommend a series of two vaccines for the best pneumonia protection.   -All adults should have a flu vaccine yearly and a tetanus vaccine every 10 years.   -All adults age 48 and older should receive the shingles vaccines (series of two vaccines).       -All adults age 38-68 who are overweight should have a diabetes screening test once every three years.   -All adults born between [birth year] and 1964 should be screened once for Hepatitis C.  -Other screening tests and preventive services for persons with diabetes include: an eye exam to screen for diabetic retinopathy, a kidney function test, a foot exam, and stricter control over your cholesterol.   -Cardiovascular screening for adults with routine risk involves an electrocardiogram (ECG) at intervals determined by your doctor.   -Colorectal cancer screenings should be done for adults age 54-65 with no increased risk factors for colorectal cancer. There are a number of acceptable methods of screening for this type of cancer. Each test has its own benefits and drawbacks. Discuss with your doctor what is most appropriate for you during your annual wellness visit. The different tests include: colonoscopy (considered the best screening method), a fecal occult blood test, a fecal DNA test, and sigmoidoscopy.    -A bone mass density test is recommended when a woman turns 65 to screen for osteoporosis. This test is only recommended one time, as a screening. Some providers will use this same test as a disease monitoring tool if you already have osteoporosis. -Breast cancer screenings are recommended every other year for women of normal risk, age 54-69.  -Cervical cancer screenings for women over age 72 are only recommended with certain risk factors.      Here is a list of your current Health Maintenance items (your personalized list of preventive services) with a due date:  Health Maintenance Due   Topic Date Due    Hepatitis C Test  Never done    COVID-19 Vaccine (1) Never done    DTaP/Tdap/Td  (1 - Tdap) Never done    Colorectal Screening  Never done    Shingles Vaccine (1 of 2) Never done    Mammogram  11/16/2017    Yearly Flu Vaccine (1) 09/01/2021

## 2021-12-03 NOTE — PROGRESS NOTES
Chief Complaint   Patient presents with    Complete Physical     1. Have you been to the ER, urgent care clinic since your last visit? Hospitalized since your last visit? NO    2. Have you seen or consulted any other health care providers outside of the [address] [place] [address] [place] since your last visit? Include any pap smears or colon screening. No    This is the Subsequent Medicare Annual Wellness Exam, performed 12 months or more after the Initial AWV or the last Subsequent AWV    I have reviewed the patient's medical history in detail and updated the computerized patient record. Assessment/Plan   Education and counseling provided:  Are appropriate based on today's review and evaluation    1. Medicare annual wellness visit, subsequent  -     ME ANNUAL ALCOHOL SCREEN 15 MIN  2. Screening for alcoholism  3. Screening for depression  -     DEPRESSION SCREEN ANNUAL       Depression Risk Factor Screening     3 most recent PHQ Screens 12/3/2021   Little interest or pleasure in doing things Not at all   Feeling down, depressed, irritable, or hopeless Not at all   Total Score PHQ 2 0       Alcohol Risk Screen    Do you average more than 1 drink per night or more than 7 drinks a week:  No    On any one occasion in the past three months have you have had more than 3 drinks containing alcohol:  No        Functional Ability and Level of Safety    Hearing: Hearing is good. Activities of Daily Living: The home contains: no safety equipment. Patient does total self care      Ambulation: with no difficulty     Fall Risk:  Fall Risk Assessment, last 12 mths 12/3/2021   Able to walk? Yes   Fall in past 12 months? 0   Do you feel unsteady? 1   Are you worried about falling 1   Is TUG test greater than 12 seconds? 0   Is the gait abnormal? 0   Number of falls in past 12 months -   Fall with injury?  -      Abuse Screen:  Patient is not abused       Cognitive Screening    Has your family/caregiver stated any concerns about your memory: no     Cognitive Screening: Normal - MMSE (Mini Mental Status Exam)    Health Maintenance Due     Health Maintenance Due   Topic Date Due    Hepatitis C Screening  Never done    COVID-19 Vaccine (1) Never done    DTaP/Tdap/Td series (1 - Tdap) Never done    Colorectal Cancer Screening Combo  Never done    Shingrix Vaccine Age 50> (1 of 2) Never done    Breast Cancer Screen Mammogram  11/16/2017    Flu Vaccine (1) 09/01/2021       Patient Care Team   Patient Care Team:  Fabienne Saldaña MD as PCP - General (Internal Medicine)  Fabienne Saldaña MD as PCP - [facility] Provider  Zacarias Medina MD as Physician (Sleep Medicine)    History     Patient Active Problem List   Diagnosis Code    Lipoma D17.9    Essential hypertension with goal blood pressure less than 140/90 I10    Allergic rhinitis J30.9     Past Medical History:   Diagnosis Date    Adverse effect of anesthesia     WOKE UP 1X DURING PROCEDURE    Arthritis     Asthma     Environmental allergies     GERD (gastroesophageal reflux disease)     Heart murmur     Hypertension     Lipoma 11/23/2015    Nausea & vomiting     ONLY NAUSEA    PUD (peptic ulcer disease)       Past Surgical History:   Procedure Laterality Date    HX GI      COLONOSCOPY    HX GYN      EXPLORATORY LAP    HX OTHER SURGICAL  1988    removed lipoma of head    HX OTHER SURGICAL  1/21/16     LOU for pinched nerve in back    HX OTHER SURGICAL  1/27/16    EXCISE RECURRENT LIPOMA POSTERIOR SCALP    HX RETINAL DETACHMENT REPAIR Left      Current Outpatient Medications   Medication Sig Dispense Refill    cholecalciferol, vitamin D3, (Vitamin D3) 50 mcg (2,000 unit) tab Take  by mouth.  elderberry fruit (ELDERBERRY PO) Take 1,000 mg by mouth daily.  multivitamin (ONE A DAY) tablet Take 1 Tablet by mouth daily.  B.infantis-B.ani-B.long-B.bifi (Probiotic 4X) 10-15 mg TbEC Take  by mouth.       COLLAGEN by Does Not Apply route daily.      amLODIPine (NORVASC) 10 mg tablet TAKE 1 TABLET BY MOUTH EVERY DAY 90 Tablet 1    olmesartan (BENICAR) 20 mg tablet TAKE 1 TABLET BY MOUTH EVERY DAY 90 Tablet 1    zinc 50 mg tab tablet Take 50 mg by mouth daily.  ascorbic acid, vitamin C, (Vitamin C) 500 mg tablet Take 1,000 mg by mouth daily.  montelukast (SINGULAIR) 10 mg tablet 10 mg.      fexofenadine (ALLEGRA) 60 mg tablet TAKE 1 TAB BY MOUTH TWO (2) TIMES A DAY. (Patient taking differently: Take 180 mg by mouth daily.) 60 Tab 0    linaCLOtide (LINZESS) 145 mcg cap capsule Take 1 Cap by mouth Daily (before breakfast). (Patient taking differently: Take 145 mcg by mouth as needed.) 30 Cap 3    albuterol (PROVENTIL HFA, VENTOLIN HFA, PROAIR HFA) 90 mcg/actuation inhaler Take 1 Puff by inhalation every four (4) hours as needed for Wheezing. 1 Inhaler 2     Allergies   Allergen Reactions    Penicillins Hives and Swelling     SWELLING OF TONGUE       Family History   Problem Relation Age of Onset    Hypertension Mother     Kidney Disease Mother         WAS ON DIALYSIS    Cancer Father         leukemia    Hypertension Sister     Other Brother         BRAIN ANEURYSM    Hypertension Brother     Anesth Problems Neg Hx      Social History     Tobacco Use    Smoking status: Never Smoker    Smokeless tobacco: Never Used   Substance Use Topics    Alcohol use:  Yes     Alcohol/week: 4.0 standard drinks     Types: 4 Glasses of wine per week     Comment: social Francine Hanson

## 2021-12-03 NOTE — PROGRESS NOTES
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      2019 pap negative     mammo  Neg  Nov 19 2021    DEXA  per endocrine  11/22/21 shows osteoporosis    Colon  UTD  Follow up every 10 years    Dentist UTD    Ophthalmologist UTD    Tobacco none    Diet/exercise  Has gained 8 lbs over last several months  Has been more sedentary    Vaccines-  tdap'  today  Flu scheduled for  Dec 27  shingrix  pending  Pneumonia 2016  prevnar pending  covid vaccine x2 UTD  covid booster scheduled Dec 27      Chronic back pain - taking tylenol 650  Fingers feel numb- consider carpal tunnel digits 1,2,3 on each hand  Prediabetes  Hypertension  Pagets  hyperlpidemia  Osteoporosis    Patient Active Problem List    Diagnosis Date Noted    Essential hypertension with goal blood pressure less than 140/90 05/27/2016    Allergic rhinitis 05/27/2016    Lipoma 11/23/2015     Current Outpatient Medications   Medication Sig Dispense Refill    cholecalciferol, vitamin D3, (Vitamin D3) 50 mcg (2,000 unit) tab Take  by mouth.  elderberry fruit (ELDERBERRY PO) Take 1,000 mg by mouth daily.  multivitamin (ONE A DAY) tablet Take 1 Tablet by mouth daily.  B.infantis-B.ani-B.long-B.bifi (Probiotic 4X) 10-15 mg TbEC Take  by mouth.  COLLAGEN by Does Not Apply route daily.  amLODIPine (NORVASC) 10 mg tablet TAKE 1 TABLET BY MOUTH EVERY DAY 90 Tablet 1    olmesartan (BENICAR) 20 mg tablet TAKE 1 TABLET BY MOUTH EVERY DAY 90 Tablet 1    zinc 50 mg tab tablet Take 50 mg by mouth daily.  ascorbic acid, vitamin C, (Vitamin C) 500 mg tablet Take 1,000 mg by mouth daily.  montelukast (SINGULAIR) 10 mg tablet 10 mg.      fexofenadine (ALLEGRA) 60 mg tablet TAKE 1 TAB BY MOUTH TWO (2) TIMES A DAY. (Patient taking differently: Take 180 mg by mouth daily.) 60 Tab 0    linaCLOtide (LINZESS) 145 mcg cap capsule Take 1 Cap by mouth Daily (before breakfast).  (Patient taking differently: Take 145 mcg by mouth as needed.) 30 Cap 3    albuterol (PROVENTIL HFA, VENTOLIN HFA, PROAIR HFA) 90 mcg/actuation inhaler Take 1 Puff by inhalation every four (4) hours as needed for Wheezing. 1 Inhaler 2     Allergies   Allergen Reactions    Penicillins Hives and Swelling     SWELLING OF TONGUE     Past Medical History:   Diagnosis Date    Adverse effect of anesthesia     WOKE UP 1X DURING PROCEDURE    Arthritis     Asthma     Environmental allergies     GERD (gastroesophageal reflux disease)     Heart murmur     Hypertension     Lipoma 11/23/2015    Nausea & vomiting     ONLY NAUSEA    PUD (peptic ulcer disease)      Past Surgical History:   Procedure Laterality Date    HX GI      COLONOSCOPY    HX GYN      EXPLORATORY LAP    HX OTHER SURGICAL  1988    removed lipoma of head    HX OTHER SURGICAL  1/21/16     LOU for pinched nerve in back    HX OTHER SURGICAL  1/27/16    EXCISE RECURRENT LIPOMA POSTERIOR SCALP    HX RETINAL DETACHMENT REPAIR Left      Family History   Problem Relation Age of Onset    Hypertension Mother     Kidney Disease Mother         WAS ON DIALYSIS    Cancer Father         leukemia    Hypertension Sister     Other Brother         BRAIN ANEURYSM    Hypertension Brother     Anesth Problems Neg Hx      Social History     Tobacco Use    Smoking status: Never Smoker    Smokeless tobacco: Never Used   Substance Use Topics    Alcohol use: Yes     Alcohol/week: 4.0 standard drinks     Types: 4 Glasses of wine per week     Comment: social        Review of Systems    A comprehensive review of systems was negative except for that written in the HPI. Objective:     Visit Vitals  BP (!) 155/80   Pulse 63   Temp 97.9 °F (36.6 °C) (Oral)   Resp 18   Ht 5' 9\" (1.753 m)   Wt 207 lb (93.9 kg)   LMP  (LMP Unknown)   SpO2 97%   BMI 30.57 kg/m²     General:  Alert, cooperative, no distress, appears stated age.    Head:  Normocephalic, without obvious abnormality, atraumatic. Eyes:  Conjunctivae/corneas clear. PERRL, EOMs intact. Ears:  Normal TMs and external ear canals both ears. Nose: Nares normal. Septum midline. Mucosa normal. No drainage or sinus tenderness. Throat: Lips, mucosa, and tongue normal. Teeth and gums normal.   Neck: Supple, symmetrical, trachea midline, no adenopathy, thyroid: no enlargement/tenderness/nodules, no carotid bruit and no JVD. Back:   Symmetric, no curvature. ROM normal. No CVA tenderness. Lungs:   Clear to auscultation bilaterally. Chest wall:  No tenderness or deformity. Heart:  Regular rate and rhythm, S1, S2 normal, no murmur, click, rub or gallop. Abdomen:   Soft, non-tender. Bowel sounds normal. No masses,  No organomegaly. Extremities: Extremities normal, atraumatic, no cyanosis or edema. Pulses: 2+ and symmetric all extremities. Skin: Skin color, texture, turgor normal. No rashes or lesions. Lymph nodes: Cervical, supraclavicular, and axillary nodes normal.   Neurologic: CNII-XII intact. Normal strength, sensation and reflexes throughout. Assessment/Plan:       ICD-10-CM ICD-9-CM    1. Screening mammogram, encounter for  Z12.31 V76.12 AIMEE MAMMO BI SCREENING INCL CAD   2. Medicare annual wellness visit, subsequent  Z00.00 V70.0 [place] ANNUAL ALCOHOL SCREEN 15 MIN      CBC WITH AUTOMATED DIFF      HEMOGLOBIN A1C WITH EAG      LIPID PANEL      URINALYSIS W/ RFLX MICROSCOPIC      URINALYSIS W/ RFLX MICROSCOPIC      LIPID PANEL      HEMOGLOBIN A1C WITH EAG      CBC WITH AUTOMATED DIFF   3. Screening for alcoholism  Z13.39 V79.1    4. Screening for depression  Z13.31 V79.0 [place] [address]   5. Bilateral carpal tunnel syndrome  G56.03 354.0 REFERRAL TO ORTHOPEDICS   6. Essential hypertension with goal blood pressure less than 140/90  I10 401.9 Continue current management  monitor   7.  BMI 30.0-30.9,adult  Z68.30 V85.30 Discussed diet, exercise, weight loss       Follow-up and Dispositions    · Return in about 1 year (around 12/3/2022), or if symptoms worsen or fail to improve. Advised her to call back or return to office if symptoms worsen/change/persist.  Discussed expected course/resolution/complications of diagnosis in detail with patient. Medication risks/benefits/costs/interactions/alternatives discussed with patient. She was given an after visit summary which includes diagnoses, current medications, & vitals. She expressed understanding with the diagnosis and plan.

## 2022-02-03 RX ORDER — OLMESARTAN MEDOXOMIL 20 MG/1
TABLET ORAL
Qty: 90 TABLET | Refills: 1 | Status: SHIPPED | OUTPATIENT
Start: 2022-02-03 | End: 2022-08-12 | Stop reason: SDUPTHER

## 2022-02-03 RX ORDER — AMLODIPINE BESYLATE 10 MG/1
TABLET ORAL
Qty: 90 TABLET | Refills: 1 | Status: SHIPPED | OUTPATIENT
Start: 2022-02-03 | End: 2022-08-02

## 2022-02-04 ENCOUNTER — TELEPHONE (OUTPATIENT)
Dept: INTERNAL MEDICINE CLINIC | Age: 72
End: 2022-02-04

## 2022-02-04 NOTE — TELEPHONE ENCOUNTER
----- Message from Grant Danielson sent at 2/4/2022  3:36 PM EST -----  Subject: Referral Request    QUESTIONS   Reason for referral request? pt is needing a referral for her foot. the   doc the pcp sent her to was just hand and knee doctor. she needs to see   someone for her foot (heel)   Has the physician seen you for this condition before? No   Preferred Specialist (if applicable)? Do you already have an appointment scheduled? No  Additional Information for Provider? wants a doc in [place] or   [place]   ---------------------------------------------------------------------------  --------------  [address]  What is the best way for the office to contact you? OK to leave message on   voicemail  Preferred Call Back Phone Number?  [phone]

## 2022-03-18 ENCOUNTER — OFFICE VISIT (OUTPATIENT)
Dept: ENDOCRINOLOGY | Age: 72
End: 2022-03-18
Payer: MEDICARE

## 2022-03-18 VITALS
HEART RATE: 62 BPM | HEIGHT: 69 IN | WEIGHT: 202 LBS | SYSTOLIC BLOOD PRESSURE: 126 MMHG | TEMPERATURE: 98.3 F | OXYGEN SATURATION: 99 % | BODY MASS INDEX: 29.92 KG/M2 | RESPIRATION RATE: 16 BRPM | DIASTOLIC BLOOD PRESSURE: 68 MMHG

## 2022-03-18 DIAGNOSIS — E55.9 VITAMIN D DEFICIENCY: ICD-10-CM

## 2022-03-18 DIAGNOSIS — M88.9 PAGET'S DISEASE OF BONE: Primary | ICD-10-CM

## 2022-03-18 DIAGNOSIS — M81.0 AGE-RELATED OSTEOPOROSIS WITHOUT CURRENT PATHOLOGICAL FRACTURE: ICD-10-CM

## 2022-03-18 PROCEDURE — 1101F PT FALLS ASSESS-DOCD LE1/YR: CPT | Performed by: INTERNAL MEDICINE

## 2022-03-18 PROCEDURE — G8417 CALC BMI ABV UP PARAM F/U: HCPCS | Performed by: INTERNAL MEDICINE

## 2022-03-18 PROCEDURE — G8427 DOCREV CUR MEDS BY ELIG CLIN: HCPCS | Performed by: INTERNAL MEDICINE

## 2022-03-18 PROCEDURE — G8754 DIAS BP LESS 90: HCPCS | Performed by: INTERNAL MEDICINE

## 2022-03-18 PROCEDURE — 1090F PRES/ABSN URINE INCON ASSESS: CPT | Performed by: INTERNAL MEDICINE

## 2022-03-18 PROCEDURE — G8752 SYS BP LESS 140: HCPCS | Performed by: INTERNAL MEDICINE

## 2022-03-18 PROCEDURE — G8510 SCR DEP NEG, NO PLAN REQD: HCPCS | Performed by: INTERNAL MEDICINE

## 2022-03-18 PROCEDURE — 3017F COLORECTAL CA SCREEN DOC REV: CPT | Performed by: INTERNAL MEDICINE

## 2022-03-18 PROCEDURE — G9899 SCRN MAM PERF RSLTS DOC: HCPCS | Performed by: INTERNAL MEDICINE

## 2022-03-18 PROCEDURE — G8536 NO DOC ELDER MAL SCRN: HCPCS | Performed by: INTERNAL MEDICINE

## 2022-03-18 PROCEDURE — 99214 OFFICE O/P EST MOD 30 MIN: CPT | Performed by: INTERNAL MEDICINE

## 2022-03-18 RX ORDER — PREDNISONE 10 MG/1
TABLET ORAL
COMMUNITY
Start: 2022-03-14

## 2022-03-18 RX ORDER — MELOXICAM 7.5 MG/1
TABLET ORAL
COMMUNITY
Start: 2022-03-03

## 2022-03-18 RX ORDER — PANTOPRAZOLE SODIUM 40 MG/1
TABLET, DELAYED RELEASE ORAL
COMMUNITY
Start: 2022-02-23

## 2022-03-18 NOTE — LETTER
3/18/2022    Patient: Marisela Delcid   YOB: 1950   Date of Visit: 3/18/2022     Bong Carney, [address] [facility]    Dear Bong Carney MD,      Thank you for referring Ms. Alina Ann to [address] [address] for evaluation. My notes for this consultation are attached. If you have questions, please do not hesitate to call me. I look forward to following your patient along with you.       Sincerely,    Miriam Delatorre MD

## 2022-03-18 NOTE — PROGRESS NOTES
Ravinder Ugalde MD        Patient Information  Date:3/18/2022  Name : James Bowman 80 y.o.     YOB: 1950         Referred by: Simeon Chamorro MD       Chief Complaint   Patient presents with    Osteoporosis    Vitamin D Deficiency    Other     Pagets       History of Present Illness: James Bowman is a 80 y.o. female here for follow-up of Paget's and osteoporosis    she was found to have osteoporosis based on the BMD in 2018,   Paget's disease which was diagnosed 30 years ago, she was on Fosamax 30 years ago which was discontinued due to GI side effects.  + Arthralgia right hip, intermittent, diagnosed with plantar fasciitis recently  No history of premature menopause  No loss of height  + GERD/ PUD    Hearing loss    Prior therapy : Fosamax, 30 years ago for Pagets     Dietary calcium Intake:  Minimal amount of dairy in her diet. On calcium and vitamin D supplements. No history of fragility fractures,chronic steroid use, hyperthyroidism. No history of excess alcohol or tobacco abuse. No known history of hypercalcemia,  Kidney stones, family history of kidney stones.       Past Medical History:   Diagnosis Date    Adverse effect of anesthesia     WOKE UP 1X DURING PROCEDURE    Arthritis     Asthma     Environmental allergies     GERD (gastroesophageal reflux disease)     Heart murmur     Hypertension     Lipoma 11/23/2015    Nausea & vomiting     ONLY NAUSEA    PUD (peptic ulcer disease)      Past Surgical History:   Procedure Laterality Date    HX GI      COLONOSCOPY    HX GYN      EXPLORATORY LAP    HX OTHER SURGICAL  1988    removed lipoma of head    HX OTHER SURGICAL  1/21/16     LOU for pinched nerve in back    HX OTHER SURGICAL  1/27/16    EXCISE RECURRENT LIPOMA POSTERIOR SCALP    HX RETINAL DETACHMENT REPAIR Left      Current Outpatient Medications   Medication Sig    predniSONE (DELTASONE) 10 mg tablet Taper dose    pantoprazole (PROTONIX) 40 mg tablet TAKE 1 TABLET BY MOUTH IN THE MORNING, TAKE 30 MINS BEFORE BREAKFAST    meloxicam (MOBIC) 7.5 mg tablet TAKE 1 TO 2 TABLETS AS NEEDED FOR PAIN NO OTHER NSAIDS OR ASPIRIN MONITOR FOR ALLERGY    olmesartan (BENICAR) 20 mg tablet TAKE 1 TABLET BY MOUTH EVERY DAY    amLODIPine (NORVASC) 10 mg tablet TAKE 1 TABLET BY MOUTH EVERY DAY    cholecalciferol, vitamin D3, (Vitamin D3) 50 mcg (2,000 unit) tab Take  by mouth.  elderberry fruit (ELDERBERRY PO) Take 1,000 mg by mouth daily.  multivitamin (ONE A DAY) tablet Take 1 Tablet by mouth daily.  ascorbic acid, vitamin C, (Vitamin C) 500 mg tablet Take 1,000 mg by mouth daily.  albuterol (PROVENTIL HFA, VENTOLIN HFA, PROAIR HFA) 90 mcg/actuation inhaler Take 1 Puff by inhalation every four (4) hours as needed for Wheezing.  B.infantis-B.ani-B.long-B.bifi (Probiotic 4X) 10-15 mg TbEC Take  by mouth. (Patient not taking: Reported on 3/18/2022)    COLLAGEN by Does Not Apply route daily. (Patient not taking: Reported on 3/18/2022)    zinc 50 mg tab tablet Take 50 mg by mouth daily. (Patient not taking: Reported on 3/18/2022)    montelukast (SINGULAIR) 10 mg tablet 10 mg. (Patient not taking: Reported on 3/18/2022)    fexofenadine (ALLEGRA) 60 mg tablet TAKE 1 TAB BY MOUTH TWO (2) TIMES A DAY. (Patient not taking: Reported on 3/18/2022)    linaCLOtide (LINZESS) 145 mcg cap capsule Take 1 Cap by mouth Daily (before breakfast). (Patient not taking: Reported on 3/18/2022)     No current facility-administered medications for this visit. Allergies   Allergen Reactions    Penicillins Hives and Swelling     SWELLING OF TONGUE         Review of Systems: Per HPI    Physical Examination:  Blood pressure 126/68, pulse 62, temperature 98.3 °F (36.8 °C), temperature source Temporal, resp. rate 16, height 5' 9\" (1.753 m), weight 202 lb (91.6 kg), SpO2 99 %. Body mass index is 29.83 kg/m².   - General: pleasant, no distress, good eye contact  - HEENT: no exopthalmos, no periorbital edema, no scleral/conjunctival injection, EOMI, no lid lag or stare  - Neck: supple, no thyromegaly,   - Cardiovascular: regular, normal rate, normal S1 and S2,   - Respiratory: clear to auscultation bilaterally  -   - Musculoskeletal: no proximal muscle weakness in upper or lower extremities  - Integumentary:  no rashes, no edema  - Neurological: Alert and oriented x3  - Psychiatric: normal mood and affect    Data Reviewed:     Lab Results   Component Value Date/Time    Vitamin D 25-Hydroxy 35.8 03/11/2022 11:15 AM       Lab Results   Component Value Date/Time    Calcium 9.7 03/11/2022 11:15 AM     Lab Results   Component Value Date/Time    GFR est non-AA >60 03/11/2022 11:15 AM    GFR est AA >60 03/11/2022 11:15 AM    Creatinine 0.86 03/11/2022 11:15 AM    BUN 16 03/11/2022 11:15 AM    Sodium 140 03/11/2022 11:15 AM    Potassium 4.9 03/11/2022 11:15 AM    Chloride 108 03/11/2022 11:15 AM    CO2 28 03/11/2022 11:15 AM    PTH, Intact 29.7 03/24/2021 11:31 AM       Assessment/Plan:     1. Paget's disease of bone    2. Age-related osteoporosis without current pathological fracture    3. Vitamin D deficiency        Osteoporosis: Severe osteoporosis based on the T score < -3 in the spine, no history of fragility fractures  Reclast 2021 first dose, had flulike symptoms which lasted for more than a week  She could not tolerate oral bisphosphonates, history of GERD, PUD  Discussed about IV bisphosphonates versus Prolia, pros and cons discussed. Some patients may not have any symptoms with the second Reclast, could pretreat  Given Paget's she cannot take anabolic's-Forteo or Tymlos  Fall precautions. Weight bearing exercises helps. Excess alcohol and smoking weakens the bone and hence should be avoided. Reclast April 2022    Paget's disease: Bone scan to assess the extent of involvement was ordered which was not completed.   MRI spine showed right pelvic involvement several years ago  S/p IV Reclast April 2021, had flulike symptoms. She could not tolerate oral bisphosphonates. Bisphosphonates are better for Paget's. Usually 1 dose is sufficient and most of the situations. But for osteoporosis she needs ongoing antiresorptive's      Vitamin D deficiency      Thank you for allowing me to participate in the care of this patient. Shahnaz Bloom MD    Patient Izzy Pablo verbalized understanding      Patient Instructions   Reclast or Prolia         Follow-up and Dispositions    · Return in about 1 year (around 3/18/2023) for labs before next visit and follow up. Voice-recognition software was used to generate this report, which may result in some phonetic-based errors in the grammar and contents. Even though attempts were made to correct all the mistakes, some may have been missed and remained in the body of the report.

## 2022-04-06 NOTE — PROGRESS NOTES
Called and spoke with pt have reviewed results and she has voiced an understanding and copy has been sent to address on file.  Yaya Kay LPN WEAKNESS Detail Level: Simple Detail Level: Detailed Detail Level: Zone

## 2022-04-22 RX ORDER — ZOLEDRONIC ACID 5 MG/100ML
5 INJECTION, SOLUTION INTRAVENOUS ONCE
Status: COMPLETED | OUTPATIENT
Start: 2022-04-29 | End: 2022-04-29

## 2022-04-29 ENCOUNTER — HOSPITAL ENCOUNTER (OUTPATIENT)
Dept: INFUSION THERAPY | Age: 72
Discharge: HOME | End: 2022-04-29
Payer: MEDICARE

## 2022-04-29 VITALS
OXYGEN SATURATION: 97 % | BODY MASS INDEX: 30.79 KG/M2 | WEIGHT: 207.9 LBS | RESPIRATION RATE: 16 BRPM | HEIGHT: 69 IN | HEART RATE: 61 BPM | DIASTOLIC BLOOD PRESSURE: 67 MMHG | SYSTOLIC BLOOD PRESSURE: 137 MMHG | TEMPERATURE: 97.9 F

## 2022-04-29 LAB
ANION GAP BLD CALC-SCNC: 11 MMOL/L (ref 10–20)
CA-I BLD-MCNC: 1.28 MMOL/L (ref 1.12–1.32)
CHLORIDE BLD-SCNC: 108 MMOL/L (ref 98–107)
CO2 BLD-SCNC: 26.1 MMOL/L (ref 21–32)
CREAT BLD-MCNC: 0.73 MG/DL (ref 0.6–1.3)
GLUCOSE BLD-MCNC: 87 MG/DL (ref 65–100)
POTASSIUM BLD-SCNC: 3.9 MMOL/L (ref 3.5–5.1)
SERVICE CMNT-IMP: ABNORMAL
SODIUM BLD-SCNC: 144 MMOL/L (ref 136–145)

## 2022-04-29 PROCEDURE — 96374 THER/PROPH/DIAG INJ IV PUSH: CPT

## 2022-04-29 PROCEDURE — 80047 BASIC METABLC PNL IONIZED CA: CPT

## 2022-04-29 PROCEDURE — 74011250636 HC RX REV CODE- 250/636

## 2022-04-29 RX ADMIN — ZOLEDRONIC ACID 5 MG: 5 INJECTION, SOLUTION INTRAVENOUS at 15:36

## 2022-04-29 NOTE — PROGRESS NOTES
[facility] Progress Note    Date: 2022    Name: Saud Galo    MRN: [id number]         : 1950    Ms. Ann Arrived ambulatory and in no distress for Reclast Infusion. Assessment was completed, no acute issues at this time, no new complaints voiced. 24 G PIV established to left arm, + blood return. Ms. Rani Mendes vitals were reviewed. Visit Vitals  /67   Pulse 61   Temp 97.9 °F (36.6 °C)   Resp 16   Ht 5' 9\" (1.753 m)   Wt 94.3 kg (207 lb 14.4 oz)   SpO2 97%   Breastfeeding No   BMI 30.70 kg/m²       Lab results were obtained and reviewed. Recent Results (from the past 12 hour(s))   POC CHEM8    Collection Time: 22  3:30 PM   Result Value Ref Range    Calcium, ionized (POC) 1.28 1.12 - 1.32 mmol/L    Sodium (POC) 144 136 - 145 mmol/L    Potassium (POC) 3.9 3.5 - 5.1 mmol/L    Chloride (POC) 108 (H) 98 - 107 mmol/L    CO2 (POC) 26.1 21 - 32 mmol/L    Anion gap (POC) 11 10 - 20 mmol/L    Glucose (POC) 87 65 - 100 mg/dL    Creatinine (POC) 0.73 0.6 - 1.3 mg/dL    GFRAA, POC >60 >60 ml/min/1.73m2    GFRNA, POC >60 >60 ml/min/1.73m2    Comment Comment Not Indicated. Medications:  Medications Administered     zoledronic acid (RECLAST) IVPB 5 mg     Admin Date  2022 Action  New Bag Dose  5 mg Rate  400 mL/hr Route  IntraVENous Administered By  Marcellus Rios, [facility] tolerated treatment well and was discharged from [place] [address] in stable condition at 1610. PIV flushed & removed. She is to follow up to schedule  her next appointment.     [facility]  2022 hard copy

## 2022-07-06 ENCOUNTER — OFFICE VISIT (OUTPATIENT)
Dept: PRIMARY CARE CLINIC | Age: 72
End: 2022-07-06
Payer: MEDICARE

## 2022-07-06 VITALS
TEMPERATURE: 97.4 F | OXYGEN SATURATION: 98 % | HEIGHT: 69 IN | BODY MASS INDEX: 30.51 KG/M2 | RESPIRATION RATE: 20 BRPM | SYSTOLIC BLOOD PRESSURE: 138 MMHG | DIASTOLIC BLOOD PRESSURE: 78 MMHG | WEIGHT: 206 LBS

## 2022-07-06 DIAGNOSIS — J45.40 MODERATE PERSISTENT ASTHMA WITHOUT COMPLICATION: ICD-10-CM

## 2022-07-06 DIAGNOSIS — R73.03 PREDIABETES: ICD-10-CM

## 2022-07-06 DIAGNOSIS — M81.8 OTHER OSTEOPOROSIS WITHOUT CURRENT PATHOLOGICAL FRACTURE: ICD-10-CM

## 2022-07-06 DIAGNOSIS — J30.9 ALLERGIC RHINITIS, UNSPECIFIED SEASONALITY, UNSPECIFIED TRIGGER: ICD-10-CM

## 2022-07-06 DIAGNOSIS — I10 ESSENTIAL HYPERTENSION: Primary | ICD-10-CM

## 2022-07-06 DIAGNOSIS — M88.9 PAGET'S DISEASE OF BONE: ICD-10-CM

## 2022-07-06 DIAGNOSIS — Z11.59 NEED FOR HEPATITIS C SCREENING TEST: ICD-10-CM

## 2022-07-06 DIAGNOSIS — K21.9 GASTROESOPHAGEAL REFLUX DISEASE WITHOUT ESOPHAGITIS: ICD-10-CM

## 2022-07-06 PROCEDURE — G8427 DOCREV CUR MEDS BY ELIG CLIN: HCPCS | Performed by: FAMILY MEDICINE

## 2022-07-06 PROCEDURE — 1090F PRES/ABSN URINE INCON ASSESS: CPT | Performed by: FAMILY MEDICINE

## 2022-07-06 PROCEDURE — G8754 DIAS BP LESS 90: HCPCS | Performed by: FAMILY MEDICINE

## 2022-07-06 PROCEDURE — 1123F ACP DISCUSS/DSCN MKR DOCD: CPT | Performed by: FAMILY MEDICINE

## 2022-07-06 PROCEDURE — G8752 SYS BP LESS 140: HCPCS | Performed by: FAMILY MEDICINE

## 2022-07-06 PROCEDURE — 1101F PT FALLS ASSESS-DOCD LE1/YR: CPT | Performed by: FAMILY MEDICINE

## 2022-07-06 PROCEDURE — G9899 SCRN MAM PERF RSLTS DOC: HCPCS | Performed by: FAMILY MEDICINE

## 2022-07-06 PROCEDURE — G8510 SCR DEP NEG, NO PLAN REQD: HCPCS | Performed by: FAMILY MEDICINE

## 2022-07-06 PROCEDURE — G8536 NO DOC ELDER MAL SCRN: HCPCS | Performed by: FAMILY MEDICINE

## 2022-07-06 PROCEDURE — G8417 CALC BMI ABV UP PARAM F/U: HCPCS | Performed by: FAMILY MEDICINE

## 2022-07-06 PROCEDURE — 3017F COLORECTAL CA SCREEN DOC REV: CPT | Performed by: FAMILY MEDICINE

## 2022-07-06 PROCEDURE — 99203 OFFICE O/P NEW LOW 30 MIN: CPT | Performed by: FAMILY MEDICINE

## 2022-07-06 RX ORDER — AMLODIPINE AND OLMESARTAN MEDOXOMIL 10; 20 MG/1; MG/1
1 TABLET ORAL
COMMUNITY

## 2022-07-06 RX ORDER — FLUTICASONE PROPIONATE 50 MCG
SPRAY, SUSPENSION (ML) NASAL
COMMUNITY
Start: 2022-05-07

## 2022-07-06 NOTE — PROGRESS NOTES
HPI     Chief Complaint:   Chief Complaint   Patient presents with   Mesha 27 is an 71 y.o. female. Patient was previously receiving care with Dr. Ivy Warren. Medical history significant for:   Patient Active Problem List   Diagnosis Code    Lipoma D17.9    Essential hypertension I10    Allergic rhinitis J30.9    Paget's disease of bone M88.9    Prediabetes R73.03    Moderate persistent asthma without complication C31.29    Gastroesophageal reflux disease without esophagitis K21.9    Other osteoporosis without current pathological fracture M81.8       Concerns for today's visit:    HTN: compliant with amlodipine-olmesartan 10/20mg. Denies leg swelling or palpitations. Prediabetes: A1c fluctuates. Recently WNL. No polydipsia reported. Lab Results   Component Value Date/Time    Hemoglobin A1c 5.3 12/03/2021 11:39 AM    Hemoglobin A1c (POC) 5.4 01/28/2019 08:14 AM        GERD: s/p cholecystectomy. Compliant with protonix daily. Has nighttime wheeze. Has followed with GI but does not recall their name. Denies melena. Paget's Disease of bone: diagnosed in 1995. Osteoperosis: was on fosamax but caused GI side effects. Patient currently on reclast.     Moderate persistent asthma: is on Trelegy and follows with an Allergist.     Allergic rhinitis: was on singulair but discontinued that. Allergies - reviewed:    Allergies   Allergen Reactions    Penicillins Hives and Swelling     SWELLING OF TONGUE       Past Medical History - reviewed:  Past Medical History:   Diagnosis Date    Adverse effect of anesthesia     WOKE UP 1X DURING PROCEDURE    Arthritis     Asthma     Environmental allergies     GERD (gastroesophageal reflux disease)     Heart murmur     Hypertension     Lipoma 11/23/2015    Nausea & vomiting     ONLY NAUSEA    PUD (peptic ulcer disease)        Past Surgical History - reviewed:  Past Surgical History:   Procedure Laterality Date    HX GI      COLONOSCOPY    HX GYN      EXPLORATORY LAP    HX OTHER SURGICAL  1988    removed lipoma of head    HX OTHER SURGICAL  1/21/16     LOU for pinched nerve in back    HX OTHER SURGICAL  1/27/16    EXCISE RECURRENT LIPOMA POSTERIOR SCALP    HX RETINAL DETACHMENT REPAIR Left          Immunizations - reviewed:   Immunization History   Administered Date(s) Administered    COVID-19, PFIZER PURPLE top, DILUTE for use, (age 15 y+), IM, 30mcg/0.3mL 06/13/2021, 06/24/2021, 12/27/2021    Influenza Vaccine 10/08/2018, 09/24/2019    Influenza Vaccine (>6 mo Afluria QUAD Vial 43668 (0.25 mL) / 62884 (0.5 mL)) 10/21/2015    Pneumococcal Polysaccharide (PPSV-23) 12/07/2016       Review of Systems   Review of Systems   Constitutional: Negative for chills and fever. Respiratory: Negative for cough and shortness of breath. Cardiovascular: Negative for chest pain and palpitations. Psychiatric/Behavioral: Negative for hallucinations and substance abuse. Reviewed PmHx, FmHx, SocHx as well as meds and allergies, updated and dated in the chart. Objective     Visit Vitals  /78 (BP 1 Location: Right upper arm, BP Patient Position: Sitting, BP Cuff Size: Adult)   Temp 97.4 °F (36.3 °C) (Temporal)   Resp 20   Ht 5' 9\" (1.753 m)   Wt 206 lb (93.4 kg)   LMP  (LMP Unknown)   SpO2 98%   BMI 30.42 kg/m²       Physical Exam  Vitals and nursing note reviewed. Constitutional:       General: She is not in acute distress. HENT:      Head: Normocephalic and atraumatic. Cardiovascular:      Rate and Rhythm: Normal rate and regular rhythm. Pulmonary:      Effort: Pulmonary effort is normal. No respiratory distress. Breath sounds: Normal breath sounds. Abdominal:      General: Bowel sounds are normal. There is no distension. Palpations: Abdomen is soft. Tenderness: There is no abdominal tenderness. There is no guarding or rebound. Skin:     General: Skin is warm.       Coloration: Skin is not jaundiced. Neurological:      Mental Status: She is alert. Assessment and Plan     Diagnoses and all orders for this visit:    1. Essential hypertension  Comments: At goal. Continue current regimen. Orders:  -     HEPATITIS C AB  -     CBC WITH AUTOMATED DIFF  -     METABOLIC PANEL, COMPREHENSIVE  -     LIPID PANEL    2. Prediabetes  Comments:  Monitor on labs. Orders:  -     METABOLIC PANEL, COMPREHENSIVE  -     LIPID PANEL    3. Paget's disease of bone  Comments: Followed by Endocrinology, Dr. Saintclair Griffith. 4. Other osteoporosis without current pathological fracture  Comments: Followed by Endocrinology. Is on reclast.     5. Allergic rhinitis, unspecified seasonality, unspecified trigger  Comments: Follows with Allergist. Patient to have previous records sent to office. 6. Moderate persistent asthma without complication  Comments:  Stable. Follows with Allergist.     7. Gastroesophageal reflux disease without esophagitis  Comments:  Stable on PPI. Follows with GI.     8. Need for hepatitis C screening test         Follow-up and Dispositions    · Return in about 6 months (around 1/6/2023) for [facility] and chronic care visit. I discussed the aforementioned diagnoses with the patient as well as the plan of care. All questions were answered and an AVS was provided.        Brandie Cortes MD  [facility]  [address], [place], [address] [place] [address]

## 2022-07-06 NOTE — PROGRESS NOTES
1. \"Have you been to the ER, urgent care clinic since your last visit? Hospitalized since your last visit? \" No    2. \"Have you seen or consulted any other health care providers outside of the [address] [place] [address] since your last visit? \" No     3. For patients aged 39-70: Has the patient had a colonoscopy / FIT/ Cologuard? No      If the patient is female:    4. For patients aged 41-77: Has the patient had a mammogram within the past 2 years? Yes - Care Gap present. Most recent result on file      5. For patients aged 21-65: Has the patient had a pap smear?  NA - based on age or sex  Visit Vitals  BP (!) 143/87 (BP 1 Location: Left upper arm, BP Patient Position: Sitting, BP Cuff Size: Large adult)   Temp 97.4 °F (36.3 °C) (Temporal)   Resp 20   Ht 5' 9\" (1.753 m)   Wt 206 lb (93.4 kg)   LMP  (LMP Unknown)   SpO2 98%   BMI 30.42 kg/m²     Chief Complaint   Patient presents with   [address]

## 2022-07-07 DIAGNOSIS — E78.5 DYSLIPIDEMIA: ICD-10-CM

## 2022-07-07 LAB
ALBUMIN SERPL-MCNC: 4.6 G/DL (ref 3.7–4.7)
ALBUMIN/GLOB SERPL: 1.6 {RATIO} (ref 1.2–2.2)
ALP SERPL-CCNC: 71 IU/L (ref 44–121)
ALT SERPL-CCNC: 14 IU/L (ref 0–32)
AST SERPL-CCNC: 18 IU/L (ref 0–40)
BASOPHILS # BLD AUTO: 0.1 X10E3/UL (ref 0–0.2)
BASOPHILS NFR BLD AUTO: 1 %
BILIRUB SERPL-MCNC: 0.3 MG/DL (ref 0–1.2)
BUN SERPL-MCNC: 15 MG/DL (ref 8–27)
BUN/CREAT SERPL: 21 (ref 12–28)
CALCIUM SERPL-MCNC: 9.4 MG/DL (ref 8.7–10.3)
CHLORIDE SERPL-SCNC: 104 MMOL/L (ref 96–106)
CHOLEST SERPL-MCNC: 244 MG/DL (ref 100–199)
CO2 SERPL-SCNC: 21 MMOL/L (ref 20–29)
CREAT SERPL-MCNC: 0.72 MG/DL (ref 0.57–1)
EGFR: 89 ML/MIN/1.73
EOSINOPHIL # BLD AUTO: 0.3 X10E3/UL (ref 0–0.4)
EOSINOPHIL NFR BLD AUTO: 7 %
ERYTHROCYTE [DISTWIDTH] IN BLOOD BY AUTOMATED COUNT: 14.7 % (ref 11.7–15.4)
GLOBULIN SER CALC-MCNC: 2.8 G/DL (ref 1.5–4.5)
GLUCOSE SERPL-MCNC: 96 MG/DL (ref 65–99)
HCT VFR BLD AUTO: 37.4 % (ref 34–46.6)
HCV AB S/CO SERPL IA: 0.1 S/CO RATIO (ref 0–0.9)
HDLC SERPL-MCNC: 76 MG/DL
HGB BLD-MCNC: 12.1 G/DL (ref 11.1–15.9)
IMM GRANULOCYTES # BLD AUTO: 0 X10E3/UL (ref 0–0.1)
IMM GRANULOCYTES NFR BLD AUTO: 0 %
LDLC SERPL CALC-MCNC: 155 MG/DL (ref 0–99)
LYMPHOCYTES # BLD AUTO: 1.6 X10E3/UL (ref 0.7–3.1)
LYMPHOCYTES NFR BLD AUTO: 38 %
MCH RBC QN AUTO: 27.9 PG (ref 26.6–33)
MCHC RBC AUTO-ENTMCNC: 32.4 G/DL (ref 31.5–35.7)
MCV RBC AUTO: 86 FL (ref 79–97)
MONOCYTES # BLD AUTO: 0.5 X10E3/UL (ref 0.1–0.9)
MONOCYTES NFR BLD AUTO: 12 %
NEUTROPHILS # BLD AUTO: 1.7 X10E3/UL (ref 1.4–7)
NEUTROPHILS NFR BLD AUTO: 42 %
PLATELET # BLD AUTO: 290 X10E3/UL (ref 150–450)
POTASSIUM SERPL-SCNC: 4 MMOL/L (ref 3.5–5.2)
PROT SERPL-MCNC: 7.4 G/DL (ref 6–8.5)
RBC # BLD AUTO: 4.34 X10E6/UL (ref 3.77–5.28)
SODIUM SERPL-SCNC: 140 MMOL/L (ref 134–144)
TRIGL SERPL-MCNC: 79 MG/DL (ref 0–149)
VLDLC SERPL CALC-MCNC: 13 MG/DL (ref 5–40)
WBC # BLD AUTO: 4.1 X10E3/UL (ref 3.4–10.8)

## 2022-07-07 RX ORDER — ATORVASTATIN CALCIUM 20 MG/1
20 TABLET, FILM COATED ORAL DAILY
Qty: 30 TABLET | Refills: 2 | Status: SHIPPED | OUTPATIENT
Start: 2022-07-07

## 2022-07-07 NOTE — PROGRESS NOTES
Pls call patient. Cholesterol is elevated and her 10 year risk of a heart attack of stroke is over 17%. Medication is recommended if this number is over 7.5%. Given this and her other risk factors, I recommend we start a cholesterol medication, atorvastatin 20mg is being called in to the pharmacy. She is to take it nightly. Often tolerated well but call with any side effects, most notably muscle aches. Other labs including blood counts, kidney and liver function, electrolytes normal. Hepatitis C screening negative.     Dr. Blanche López.  ----------------------  The 10-year ASCVD risk score (Alberta Raphael, et al., 2013) is: 17.8%

## 2022-07-08 NOTE — PROGRESS NOTES
Called and spoke with patient regarding bw results. At this time patient does not want to start medication, will work on diet and exercise.

## 2022-08-02 RX ORDER — AMLODIPINE BESYLATE 10 MG/1
TABLET ORAL
Qty: 90 TABLET | Refills: 1 | Status: SHIPPED | OUTPATIENT
Start: 2022-08-02

## 2022-08-12 RX ORDER — OLMESARTAN MEDOXOMIL 20 MG/1
20 TABLET ORAL DAILY
Qty: 90 TABLET | Refills: 1 | Status: SHIPPED | OUTPATIENT
Start: 2022-08-12

## 2022-11-18 LAB — MAMMOGRAPHY, EXTERNAL: NORMAL

## 2022-12-07 ENCOUNTER — OFFICE VISIT (OUTPATIENT)
Dept: PRIMARY CARE CLINIC | Age: 72
End: 2022-12-07
Payer: MEDICARE

## 2022-12-07 VITALS
BODY MASS INDEX: 30.9 KG/M2 | WEIGHT: 208.6 LBS | OXYGEN SATURATION: 98 % | HEART RATE: 63 BPM | DIASTOLIC BLOOD PRESSURE: 78 MMHG | SYSTOLIC BLOOD PRESSURE: 136 MMHG | HEIGHT: 69 IN | TEMPERATURE: 97.2 F | RESPIRATION RATE: 20 BRPM

## 2022-12-07 DIAGNOSIS — J45.40 MODERATE PERSISTENT ASTHMA WITHOUT COMPLICATION: ICD-10-CM

## 2022-12-07 DIAGNOSIS — M81.8 OTHER OSTEOPOROSIS WITHOUT CURRENT PATHOLOGICAL FRACTURE: ICD-10-CM

## 2022-12-07 DIAGNOSIS — E66.9 OBESITY (BMI 30-39.9): ICD-10-CM

## 2022-12-07 DIAGNOSIS — R73.03 PREDIABETES: ICD-10-CM

## 2022-12-07 DIAGNOSIS — I10 ESSENTIAL HYPERTENSION: ICD-10-CM

## 2022-12-07 DIAGNOSIS — Z00.00 MEDICARE ANNUAL WELLNESS VISIT, SUBSEQUENT: Primary | ICD-10-CM

## 2022-12-07 DIAGNOSIS — Z23 ENCOUNTER FOR IMMUNIZATION: ICD-10-CM

## 2022-12-07 DIAGNOSIS — E78.5 DYSLIPIDEMIA: ICD-10-CM

## 2022-12-07 DIAGNOSIS — Z12.11 ENCOUNTER FOR SCREENING FOR MALIGNANT NEOPLASM OF COLON: ICD-10-CM

## 2022-12-07 PROCEDURE — G9899 SCRN MAM PERF RSLTS DOC: HCPCS | Performed by: FAMILY MEDICINE

## 2022-12-07 PROCEDURE — 3074F SYST BP LT 130 MM HG: CPT | Performed by: FAMILY MEDICINE

## 2022-12-07 PROCEDURE — G8752 SYS BP LESS 140: HCPCS | Performed by: FAMILY MEDICINE

## 2022-12-07 PROCEDURE — G8754 DIAS BP LESS 90: HCPCS | Performed by: FAMILY MEDICINE

## 2022-12-07 PROCEDURE — 90715 TDAP VACCINE 7 YRS/> IM: CPT | Performed by: FAMILY MEDICINE

## 2022-12-07 PROCEDURE — G8427 DOCREV CUR MEDS BY ELIG CLIN: HCPCS | Performed by: FAMILY MEDICINE

## 2022-12-07 PROCEDURE — 1123F ACP DISCUSS/DSCN MKR DOCD: CPT | Performed by: FAMILY MEDICINE

## 2022-12-07 PROCEDURE — G8417 CALC BMI ABV UP PARAM F/U: HCPCS | Performed by: FAMILY MEDICINE

## 2022-12-07 PROCEDURE — G0439 PPPS, SUBSEQ VISIT: HCPCS | Performed by: FAMILY MEDICINE

## 2022-12-07 PROCEDURE — 1090F PRES/ABSN URINE INCON ASSESS: CPT | Performed by: FAMILY MEDICINE

## 2022-12-07 PROCEDURE — G8510 SCR DEP NEG, NO PLAN REQD: HCPCS | Performed by: FAMILY MEDICINE

## 2022-12-07 PROCEDURE — 99213 OFFICE O/P EST LOW 20 MIN: CPT | Performed by: FAMILY MEDICINE

## 2022-12-07 PROCEDURE — 1101F PT FALLS ASSESS-DOCD LE1/YR: CPT | Performed by: FAMILY MEDICINE

## 2022-12-07 PROCEDURE — 3078F DIAST BP <80 MM HG: CPT | Performed by: FAMILY MEDICINE

## 2022-12-07 PROCEDURE — 3017F COLORECTAL CA SCREEN DOC REV: CPT | Performed by: FAMILY MEDICINE

## 2022-12-07 PROCEDURE — G8536 NO DOC ELDER MAL SCRN: HCPCS | Performed by: FAMILY MEDICINE

## 2022-12-07 RX ORDER — ZOSTER VACCINE RECOMBINANT, ADJUVANTED 50 MCG/0.5
0.5 KIT INTRAMUSCULAR ONCE
Qty: 0.5 ML | Refills: 1 | Status: SHIPPED | OUTPATIENT
Start: 2022-12-07 | End: 2022-12-07

## 2022-12-07 RX ORDER — ATORVASTATIN CALCIUM 20 MG/1
20 TABLET, FILM COATED ORAL DAILY
Qty: 30 TABLET | Refills: 2 | Status: SHIPPED | OUTPATIENT
Start: 2022-12-07

## 2022-12-07 NOTE — PROGRESS NOTES
1. \"Have you been to the ER, urgent care clinic since your last visit? Hospitalized since your last visit? \" No    2. \"Have you seen or consulted any other health care providers outside of the [address] [place] [address] since your last visit? \" No     3. For patients aged 39-70: Has the patient had a colonoscopy / FIT/ Cologuard? No      If the patient is female:    4. For patients aged 41-77: Has the patient had a mammogram within the past 2 years? Yes - Care Gap present. Most recent result on file      5. For patients aged 21-65: Has the patient had a pap smear?  NA - based on age or sex  Chief Complaint   Patient presents with    Annual Wellness Visit     Visit Vitals  /78 (BP 1 Location: Left upper arm, BP Patient Position: Sitting, BP Cuff Size: Large adult)   Pulse 63   Temp 97.2 °F (36.2 °C) (Temporal)   Resp 20   Ht 5' 9\" (1.753 m)   Wt 208 lb 9.6 oz (94.6 kg)   LMP  (LMP Unknown)   SpO2 98%   BMI 30.80 kg/m²

## 2022-12-07 NOTE — PROGRESS NOTES
HPI     Chief Complaint   Patient presents with    Annual Wellness Visit        HPI:  Jono Caicedo is a 71 y.o. female who has a history of HTN, Prediabetes, GERD, Paget's Disease, Osteoperosis, Asthma, Allergic Rhinitis. Obesity: patient discouraged by weight. She exercises 3 times a week, walks and tries to eat healthy. Weight continues to increase. She is open to medication options. She denies hx of thyroid disease. Weight Metrics 12/7/2022 7/6/2022 4/29/2022 3/18/2022 12/3/2021 9/24/2021 4/13/2021   Weight 208 lb 9.6 oz 206 lb 207 lb 14.4 oz 202 lb 207 lb 207 lb 199 lb   BMI 30.8 kg/m2 30.42 kg/m2 30.7 kg/m2 29.83 kg/m2 30.57 kg/m2 30.57 kg/m2 29.39 kg/m2       HTN: compliant with amlodipine 10mg and olmesartan 20mg (Insurance stopped covering 1 [employer]). Denies leg swelling or palpitations. Prediabetes: A1c fluctuates. Recently WNL. No polydipsia reported. Lab Results   Component Value Date/Time    Hemoglobin A1c 5.3 12/03/2021 11:39 AM    Hemoglobin A1c (POC) 5.4 01/28/2019 08:14 AM         GERD: s/p cholecystectomy. Compliant with protonix daily. Has nighttime wheeze. Has followed with GI but does not recall their name. Denies melena. Paget's Disease of bone: diagnosed in 1995. Osteoperosis: was on fosamax but caused GI side effects. Patient currently on reclast.      Moderate persistent asthma: is on Trelegy and follows with an Allergist.          Allergies   Allergen Reactions    Penicillins Hives and Swelling     SWELLING OF TONGUE       Current Outpatient Medications   Medication Sig    varicella-zoster recombinant, PF, (Shingrix, PF,) 50 mcg/0.5 mL susr injection 0.5 mL by IntraMUSCular route once for 1 dose.  atorvastatin (LIPITOR) 20 mg tablet Take 1 Tablet by mouth daily.  semaglutide (OZEMPIC) 0.25 mg or 0.5 mg/dose (2 mg/1.5 ml) subq pen 0.25 mg by SubCUTAneous route every seven (7) days.  olmesartan (BENICAR) 20 mg tablet Take 1 Tablet by mouth in the morning.     amLODIPine (NORVASC) 10 mg tablet TAKE 1 TABLET BY MOUTH EVERY DAY    fluticasone-umeclidinium-vilanterol (TRELEGY ELLIPTA) 100-62.5-25 mcg inhaler DAILY    fluticasone propionate (FLONASE) 50 mcg/actuation nasal spray USE 2 (TWO) SPRAY EACH NOSTRIL DAILY    pantoprazole (PROTONIX) 40 mg tablet TAKE 1 TABLET BY MOUTH IN THE MORNING, TAKE 30 MINS BEFORE BREAKFAST    cholecalciferol, vitamin D3, 50 mcg (2,000 unit) tab Take  by mouth.  multivitamin (ONE A DAY) tablet Take 1 Tablet by mouth daily.  albuterol (PROVENTIL HFA, VENTOLIN HFA, PROAIR HFA) 90 mcg/actuation inhaler Take 1 Puff by inhalation every four (4) hours as needed for Wheezing. No current facility-administered medications for this visit. Review of Systems   Constitutional:  Negative for chills and fever. Respiratory:  Negative for cough and shortness of breath. Cardiovascular:  Negative for chest pain and palpitations. Psychiatric/Behavioral:  Negative for hallucinations and substance abuse. Reviewed PmHx, FmHx, SocHx as well as meds and allergies, updated and dated in the chart. Objective     Visit Vitals  /78 (BP 1 Location: Left upper arm, BP Patient Position: Sitting, BP Cuff Size: Large adult)   Pulse 63   Temp 97.2 °F (36.2 °C) (Temporal)   Resp 20   Ht 5' 9\" (1.753 m)   Wt 208 lb 9.6 oz (94.6 kg)   SpO2 98%   BMI 30.80 kg/m²     Physical Exam  Vitals and nursing note reviewed. Constitutional:       Appearance: Normal appearance. HENT:      Head: Normocephalic and atraumatic. Eyes:      Extraocular Movements: Extraocular movements intact. Conjunctiva/sclera: Conjunctivae normal.   Cardiovascular:      Rate and Rhythm: Normal rate and regular rhythm. Heart sounds: Normal heart sounds. Pulmonary:      Effort: Pulmonary effort is normal. No respiratory distress. Breath sounds: Normal breath sounds. Musculoskeletal:      Cervical back: Normal range of motion and neck supple.  No tenderness. Neurological:      General: No focal deficit present. Mental Status: She is alert and oriented to person, place, and time. Assessment and Plan     Diagnoses and all orders for this visit:    1. Medicare annual wellness visit, subsequent  Comments:  Age appropriate guidance given. HM updated. TDaP today in-office. Shingrix sent to pharmacy. Orders:  -     REFERRAL TO [facility] CLINICAL SPECIALIST    2. Prediabetes  Comments:  Checking A1c today. Encouraged to avoid high-glycemic foods. Orders:  -     HEMOGLOBIN A1C WITH EAG  -     semaglutide (OZEMPIC) 0.25 mg or 0.5 mg/dose (2 mg/1.5 ml) subq pen; 0.25 mg by SubCUTAneous route every seven (7) days. 3. Obesity (BMI 30-39. 9)  Comments:  Increase exercise to goal of 120-150 minutes of moderate intensity weekly. Start ozempic 0.25mg weekly. Orders:  -     METABOLIC PANEL, COMPREHENSIVE  -     LIPID PANEL  -     semaglutide (OZEMPIC) 0.25 mg or 0.5 mg/dose (2 mg/1.5 ml) subq pen; 0.25 mg by SubCUTAneous route every seven (7) days. 4. Other osteoporosis without current pathological fracture  Comments: Follows with Dr. Tim Montesinos. Continue reclast. Has upcoming DeXA scan. 5. Essential hypertension  Comments: At goal. Continue current regimen. Orders:  -     CBC W/O DIFF  -     METABOLIC PANEL, COMPREHENSIVE    6. Dyslipidemia  Comments:  Continue atorvastatin 20mg nightly. Orders:  -     METABOLIC PANEL, COMPREHENSIVE  -     atorvastatin (LIPITOR) 20 mg tablet; Take 1 Tablet by mouth daily.  -     LIPID PANEL    7. Moderate persistent asthma without complication  Comments:  Stable on Trelegy, continue. 8. Encounter for immunization  -     varicella-zoster recombinant, PF, (Shingrix, PF,) 50 mcg/0.5 mL susr injection; 0.5 mL by IntraMUSCular route once for 1 dose. -     TDAP, BOOSTRIX, (AGE 10 YRS+), IM    9.  Encounter for screening for malignant neoplasm of colon  -     REFERRAL TO GASTROENTEROLOGY         As applicable:  Medication Side Effects and Warnings were discussed with patient. Patient Labs were reviewed and or requested. Patient Past Records were reviewed and or requested. Follow-up and Dispositions    Return in about 3 months (around 3/7/2023) for Weight management. I have discussed the diagnosis with the patient and the intended plan as seen in the above orders. The patient has received an after-visit summary and questions were answered concerning future plans. I have discussed medication side effects and warnings with the patient as well.       Lata Mir MD  [address] [place] [address]

## 2022-12-07 NOTE — PROGRESS NOTES
Macie  [address] [facility], [address] [place] [address]  [phone]    Date of visit: 12/7/2022       This is a Subsequent Medicare Annual Wellness Visit (AWV), (Performed more than 12 months after effective date of Medicare Part B enrollment and 12 months after last preventive visit.)    I have reviewed the patient's medical history in detail and updated the computerized patient record. History obtained from: the patient. female  71 y.o. BLACK/  Depression Risk Factor Screening:     3 most recent PHQ Screens 12/7/2022   Little interest or pleasure in doing things Not at all   Feeling down, depressed, irritable, or hopeless Not at all   Total Score PHQ 2 0     C-SSRS [place] Suicide Severity Rating Scale 4/13/2021   1) Within the past month, have you wished you were dead or wished you could go to sleep and not wake up? No   2) Have you actually had any thoughts of killing yourself? No   6) Have you ever done anything, started to do anything, or prepared to do anything to end your life? No       Fall Risk Factor Screening:     Fall Risk Assessment, last 12 mths 12/7/2022   Able to walk? Yes   Fall in past 12 months? 0   Do you feel unsteady? 0   Are you worried about falling 0   Is TUG test greater than 12 seconds? -   Is the gait abnormal? -   Number of falls in past 12 months -   Fall with injury? -       Alcohol Risk Screen    Do you average more than 1 drink per night or more than 7 drinks a week:  No    On any one occasion in the past three months have you have had more than 3 drinks containing alcohol:  No        Functional Ability and Level of Safety:    Hearing: Hearing is good. Activities of Daily Living: The home contains: no safety equipment.   Patient does total self care      Ambulation: with no difficulty      Abuse Screen:  Patient is not abused         Histories     Reviewed PmHx, FmHx, SocHx as well as meds and allergies, updated and dated in the chart. Patient Active Problem List   Diagnosis Code    Lipoma D17.9    Essential hypertension I10    Allergic rhinitis J30.9    Paget's disease of bone M88.9    Prediabetes R73.03    Moderate persistent asthma without complication G33.19    Gastroesophageal reflux disease without esophagitis K21.9    Other osteoporosis without current pathological fracture M81.8    Dyslipidemia E78.5    Obesity (BMI 30-39. 9) E66.9     Past Medical History:   Diagnosis Date    Adverse effect of anesthesia     WOKE UP 1X DURING PROCEDURE    Arthritis     Asthma     Environmental allergies     GERD (gastroesophageal reflux disease)     Heart murmur     Hypertension     Lipoma 11/23/2015    Nausea & vomiting     ONLY NAUSEA    PUD (peptic ulcer disease)       Past Surgical History:   Procedure Laterality Date    HX GI      COLONOSCOPY    HX GYN      EXPLORATORY LAP    HX OTHER SURGICAL  1988    removed lipoma of head    HX OTHER SURGICAL  1/21/16     LOU for pinched nerve in back    HX OTHER SURGICAL  1/27/16    EXCISE RECURRENT LIPOMA POSTERIOR SCALP    HX RETINAL DETACHMENT REPAIR Left      Allergies   Allergen Reactions    Penicillins Hives and Swelling     SWELLING OF TONGUE     Current Outpatient Medications   Medication Sig Dispense Refill    varicella-zoster recombinant, PF, (Shingrix, PF,) 50 mcg/0.5 mL susr injection 0.5 mL by IntraMUSCular route once for 1 dose. 0.5 mL 1    atorvastatin (LIPITOR) 20 mg tablet Take 1 Tablet by mouth daily. 30 Tablet 2    semaglutide (OZEMPIC) 0.25 mg or 0.5 mg/dose (2 mg/1.5 ml) subq pen 0.25 mg by SubCUTAneous route every seven (7) days. 1 Box 0    olmesartan (BENICAR) 20 mg tablet Take 1 Tablet by mouth in the morning.  90 Tablet 1    amLODIPine (NORVASC) 10 mg tablet TAKE 1 TABLET BY MOUTH EVERY DAY 90 Tablet 1    fluticasone-umeclidinium-vilanterol (TRELEGY ELLIPTA) 100-62.5-25 mcg inhaler DAILY      fluticasone propionate (FLONASE) 50 mcg/actuation nasal spray USE 2 (TWO) SPRAY EACH NOSTRIL DAILY      pantoprazole (PROTONIX) 40 mg tablet TAKE 1 TABLET BY MOUTH IN THE MORNING, TAKE 30 MINS BEFORE BREAKFAST      cholecalciferol, vitamin D3, 50 mcg (2,000 unit) tab Take  by mouth.      multivitamin (ONE A DAY) tablet Take 1 Tablet by mouth daily. albuterol (PROVENTIL HFA, VENTOLIN HFA, PROAIR HFA) 90 mcg/actuation inhaler Take 1 Puff by inhalation every four (4) hours as needed for Wheezing. 1 Inhaler 2     Family History   Problem Relation Age of Onset    Hypertension Mother     Kidney Disease Mother         WAS ON DIALYSIS    Cancer Father         leukemia    Hypertension Sister     Other Brother         BRAIN ANEURYSM    Hypertension Brother     Anesth Problems Neg Hx      Social History     Tobacco Use    Smoking status: Never    Smokeless tobacco: Never   Substance Use Topics    Alcohol use: Yes     Alcohol/week: 4.0 standard drinks     Types: 4 Glasses of wine per week       Current Complaints and Pertinent Exam   If patient is due for chronic medical conditions and/or has acute concerns in addition to the medicare wellness visit, they have been informed there may be a copay for the additional services provided, and agree to do both. ROS & Physical Exam: please see acute note if applicable      Diet and Exercise: Exercises 3 days a week and tries to eat healthy. Only eats about twice a day. BP Readings from Last 3 Encounters:   12/07/22 136/78   07/06/22 138/78   04/29/22 137/67      Wt Readings from Last 3 Encounters:   12/07/22 208 lb 9.6 oz (94.6 kg)   07/06/22 206 lb (93.4 kg)   04/29/22 207 lb 14.4 oz (94.3 kg)     Body mass index is 30.8 kg/m². No results found. Was the patient's timed Up & Go test unsteady or longer than 30 seconds? no    Evaluation of Cognitive Function   Mood/affect:  neutral  Orientation: Person, Place, Time, and Situation  Appearance: age appropriate and casually dressed  Family member/caregiver input: n/a    Specialists/Care Team   [employer]. Seth has established care with the following healthcare providers:  Patient Care Team:  Rae Carmona MD as PCP - General (Family Medicine)  Rae Carmona MD as PCP [facility]  Merla Meigs, MD as Physician (Sleep Medicine Physician)     [address] [facility] Maintenance Topics with due status: Overdue       Topic Date Due    DTaP/Tdap/Td series Never done    Colorectal Cancer Screening Combo Never done    Shingrix Vaccine Age 50> Never done    COVID-19 Vaccine 02/21/2022    A1C test (Diabetic or Prediabetic) 12/03/2022     Health Maintenance Topics with due status: Not Due       Topic Last Completion Date    Depression Screen 07/06/2022    Lipid Screen 07/06/2022    Breast Cancer Screen Mammogram 11/18/2022    Medicare Yearly Exam 12/07/2022     Health Maintenance Topics with due status: Completed       Topic Last Completion Date    Pneumococcal 65+ years 12/07/2016    Bone Densitometry (Dexa) Screening 11/22/2021    Hepatitis C Screening 07/06/2022    Flu Vaccine 09/01/2022         Colon cancer:  Recommendation: Colonoscopy every 10y or annual FIT test from 50-75 or every 3 year stool DNA based test with consideration of ongoing screening from 76-85. and Due, ordered colonoscopy    Lung cancer (LDCT): Recommendation: Yearly LDCT for pts 55-77 w 30-pack year hx and currently smoke or quit <15 yr ago. Hepatitis C:  Recommendation: One time screening for all patient's aged 18-79. Diabetes:   Recommendation: USPSTF recommends screening ages 38-69 y/o if overweight or obese.  Medicare covers screening in those patients who are overweight, obese, have HTN or dyslipiemia, a personal history of gestational diabetes or prior elevated blood sugar, a family hx of DM, or any patient over age 72    Lipids:   Recommendation: screening for hyperlipidemia every 5 years after age 39        PREVENTIVE CARE - FEMALE SCREENINGS     Cervical cancer: Recommendation: Every 3 yr from 21-29 and every 5 yr from 33-67, with Pap and HPV testing. and Not Indicated    Breast cancer: Recommendation:  USPSTF recommends screening mammography every 2 yr from 54-69. The decision to start screening mammography in women prior to age 48 years should be an individual one. Women who place a higher value on the potential benefit than the potential harms may choose to begin biennial screening between the ages of 36 and 52 years. Medicare covers annual screening mammography and USPSTF also recommends women with a personal or family history of breast, ovarian, tubal, or peritoneal cancer or who have an ancestry ([address]) associated with breast cancer susceptibility 1 and 2 (BRCA1/2) gene mutations with an appropriate brief familial risk assessment tool.  Women with a positive result on the risk assessment tool should receive genetic counseling and, if indicated after counseling, genetic testing    Osteoporosis: Recommendation: Screen all women at 72, earlier if elevated risk    AAA:   Recommendation: One-time screening if family history of AAA and Not Indicated      IMMUNIZATIONS     Immunization History   Administered Date(s) Administered    COVID-19, PFIZER PURPLE top, DILUTE for use, (age 15 y+), IM, 30mcg/0.3mL 06/13/2021, 06/24/2021, 12/27/2021    Influenza Vaccine 10/08/2018, 09/24/2019, 09/01/2022    Influenza, AFLURIA (age 11-32 mo), IM, MDV, 0.25 mL, Fluzone (age 10 mo+), AFLURIA (age 1 y+), IM, MDV, 0.5mL 10/21/2015    Pneumococcal Polysaccharide (PPSV-23) 12/07/2016       Pneumovax:   Recommendation: PPSV23 once for all >65 and high risk <65     Prevnar:   Recommendation: PCV13 only if >65 and immunocompromised or residing in a nursing home, or in areas of low childhood Pneumococcal vaccination and Not Indicated    Influenza:   Recommendation: Vaccination annually, high dose if 65 or older    Shingrix:  Recommendation: Vaccination 2 shots 2-6 months apart for all age >47 and Due, prescription sent to patient's preferred pharmacy    TDaP:    Recommendation: Vaccination Booster with TDaP every 10 yr. and due given today. Discussion of Advance Directive   Discussed with Olena Del Castillo. Ann her ability to prepare and advance directive in the case that an injury or illness causes her to be unable to make health care decisions. Date of ACP Conversation: 12/07/22  Persons included in Conversation:  patient  Length of ACP Conversation in minutes:  <16 minutes (Non-Billable)    Authorized Decision Maker (if patient is incapable of making informed decisions): This person is:   Named in [facility] documents. For Patients with Decision Making Capacity:   Values/Goals: Exploration of values, goals, and preferences if recovery is not expected, even with continued medical treatment in the event of:  Imminent death  Severe, permanent brain injury  \"In these circumstances, what matters most to you? \"  Care focused more on comfort or quality of life. Conversation Outcomes / Follow-Up Plan:   ACP incomplete - refer to ACP Clinical Specialist    Assessment/Plan     Diagnoses and all orders for this visit:    1. Medicare annual wellness visit, subsequent  Comments:  Age appropriate guidance given.  updated. TDaP today in-office. Shingrix sent to pharmacy. Orders:  -     REFERRAL TO ACP CLINICAL SPECIALIST    2. Prediabetes  Comments:  Checking A1c today. Encouraged to avoid high-glycemic foods. Orders:  -     HEMOGLOBIN A1C WITH EAG  -     semaglutide (OZEMPIC) 0.25 mg or 0.5 mg/dose (2 mg/1.5 ml) subq pen; 0.25 mg by SubCUTAneous route every seven (7) days. 3. Obesity (BMI 30-39. 9)  Comments:  Increase exercise to goal of 120-150 minutes of moderate intensity weekly. Start ozempic 0.25mg weekly. Orders:  -     METABOLIC PANEL, COMPREHENSIVE  -     LIPID PANEL  -     semaglutide (OZEMPIC) 0.25 mg or 0.5 mg/dose (2 mg/1.5 ml) subq pen; 0.25 mg by SubCUTAneous route every seven (7) days.     4. Other osteoporosis without current pathological fracture  Comments: Follows with Dr. Jamie Vicente. Continue reclast. Has upcoming DeXA scan. 5. Essential hypertension  Comments: At goal. Continue current regimen. Orders:  -     CBC W/O DIFF  -     METABOLIC PANEL, COMPREHENSIVE    6. Dyslipidemia  Comments:  Continue atorvastatin 20mg nightly. Orders:  -     METABOLIC PANEL, COMPREHENSIVE  -     atorvastatin (LIPITOR) 20 mg tablet; Take 1 Tablet by mouth daily.  -     LIPID PANEL    7. Moderate persistent asthma without complication  Comments:  Stable on Trelegy, continue. 8. Encounter for immunization  -     varicella-zoster recombinant, PF, (Shingrix, PF,) 50 mcg/0.5 mL susr injection; 0.5 mL by IntraMUSCular route once for 1 dose. -     TDAP, BOOSTRIX, (AGE 10 YRS+), IM    9. Encounter for screening for malignant neoplasm of colon  -     REFERRAL TO GASTROENTEROLOGY        Follow-up and Dispositions    Return in about 3 months (around 3/7/2023) for Weight management.          Erick Republic, MD  [address] [place] [address]

## 2022-12-08 ENCOUNTER — PATIENT OUTREACH (OUTPATIENT)
Dept: CASE MANAGEMENT | Age: 72
End: 2022-12-08

## 2022-12-08 NOTE — ACP (ADVANCE CARE PLANNING)
Advance Care Planning   Ambulatory ACP Specialist Patient Outreach    Date:  12/8/2022    ACP Specialist:  Yung Burciaga    Outreach call to patient in follow-up to ACP Specialist referral from:  Candi Sim MD    [x] PCP  [] Provider   [] Ambulatory Care Management [] Other     For:                  [x] Advance Directive Assistance              [] Complete Portable DNR order              [] Complete POST/MOST              [x] Code Status Discussion             [x] Discuss Goals of Care             [] Early ACP Decision-Making              [] Other (Specify)    Date Referral Received:  12/7/2022    Today's Outreach:  [x] First   [] Second  [] Third       Third outreach made by: [] Phone  [] Email / mail    [] "[employer]hart     Intervention:  [] Spoke with Patient   [x] Left VM requesting return call      Outcome:  Attempted ACP outreach - no answer. Left VM offering a conversation with an ACP Specialist for assistance with advance care planning. Will attempt outreach again in one week if return call not received. Next Step:   [] ACP scheduled conversation  [x] Outreach again in one week               [] Email / Mail ACP Info Sheets  [] Email / Mail Advance Directive   [] Closing referral.  Routing closure to referring provider/staff and to ACP Specialist . [] Closure letter mailed to patient with invitation to contact ACP Specialist if / when ready.   Thank you for this referral.

## 2022-12-09 LAB
ALBUMIN SERPL-MCNC: 4.5 G/DL (ref 3.7–4.7)
ALBUMIN/GLOB SERPL: 1.5 {RATIO} (ref 1.2–2.2)
ALP SERPL-CCNC: 69 IU/L (ref 44–121)
ALT SERPL-CCNC: 14 IU/L (ref 0–32)
AST SERPL-CCNC: 18 IU/L (ref 0–40)
BILIRUB SERPL-MCNC: 0.4 MG/DL (ref 0–1.2)
BUN SERPL-MCNC: 11 MG/DL (ref 8–27)
BUN/CREAT SERPL: 14 (ref 12–28)
CALCIUM SERPL-MCNC: 9.9 MG/DL (ref 8.7–10.3)
CHLORIDE SERPL-SCNC: 104 MMOL/L (ref 96–106)
CHOLEST SERPL-MCNC: 230 MG/DL (ref 100–199)
CO2 SERPL-SCNC: 22 MMOL/L (ref 20–29)
CREAT SERPL-MCNC: 0.76 MG/DL (ref 0.57–1)
EGFR: 84 ML/MIN/1.73
ERYTHROCYTE [DISTWIDTH] IN BLOOD BY AUTOMATED COUNT: 14.3 % (ref 11.7–15.4)
EST. AVERAGE GLUCOSE BLD GHB EST-MCNC: 114 MG/DL
GLOBULIN SER CALC-MCNC: 3 G/DL (ref 1.5–4.5)
GLUCOSE SERPL-MCNC: 88 MG/DL (ref 70–99)
HBA1C MFR BLD: 5.6 % (ref 4.8–5.6)
HCT VFR BLD AUTO: 37.6 % (ref 34–46.6)
HDLC SERPL-MCNC: 77 MG/DL
HGB BLD-MCNC: 12.2 G/DL (ref 11.1–15.9)
LDLC SERPL CALC-MCNC: 137 MG/DL (ref 0–99)
MCH RBC QN AUTO: 27.9 PG (ref 26.6–33)
MCHC RBC AUTO-ENTMCNC: 32.4 G/DL (ref 31.5–35.7)
MCV RBC AUTO: 86 FL (ref 79–97)
PLATELET # BLD AUTO: 295 X10E3/UL (ref 150–450)
POTASSIUM SERPL-SCNC: 4.2 MMOL/L (ref 3.5–5.2)
PROT SERPL-MCNC: 7.5 G/DL (ref 6–8.5)
RBC # BLD AUTO: 4.38 X10E6/UL (ref 3.77–5.28)
SODIUM SERPL-SCNC: 142 MMOL/L (ref 134–144)
TRIGL SERPL-MCNC: 91 MG/DL (ref 0–149)
VLDLC SERPL CALC-MCNC: 16 MG/DL (ref 5–40)
WBC # BLD AUTO: 4.7 X10E3/UL (ref 3.4–10.8)

## 2022-12-09 NOTE — PROGRESS NOTES
Please call patient. Cholesterol levels. Increased risk for heart attack or stroke within the next 10 years. Her risk is over 16%. Medication is recommended when this risk is greater than 7.5%. I recommend we start atorvastatin 20 mg nightly. Please let me know if she is amenable to this. Is suppressing cause side effects that is upset stomach and muscle aches. If this occurs please have her stop the medication and contact me. Remaining labs including blood sugars, blood counts, kidney function, liver function, electrolytes within normal limits.     Dr. HOPKINS [facility]  --------------  The 10-year ASCVD risk score (Erin GUARDADO, et al., 2019) is: 16.6%

## 2022-12-15 ENCOUNTER — PATIENT OUTREACH (OUTPATIENT)
Dept: CASE MANAGEMENT | Age: 72
End: 2022-12-15

## 2022-12-15 NOTE — ACP (ADVANCE CARE PLANNING)
Advance Care Planning   Ambulatory ACP Specialist Patient Outreach    Date:  12/15/2022    ACP Specialist:  Real Esquivel    Outreach call to patient in follow-up to ACP Specialist referral from:  Marissa Leonardo MD    [x] PCP  [] Provider   [] Ambulatory Care Management [] Other     For:                  [x] Advance Directive Assistance              [] Complete Portable DNR order              [] Complete POST/MOST              [x] Code Status Discussion             [x] Discuss Goals of Care             [] Early ACP Decision-Making              [] Other (Specify)    Date Referral Received:  12/7/2022    Today's Outreach:  [] First   [x] Second  [] Third       Third outreach made by: [] Phone  [] Email / mail    [] [employer]     Intervention:  [] Spoke with Patient   [x] Left VM requesting return call      Outcome:  Second outreach attempted to offer Pt conversation with ACP Specialist for advance care planning assistance - no answer. Left VM detailing reason for call and requested return call. ADDENDUM 12/15/22:  Patient returned outreach call and declined assistance from ACP Specialist reporting she will be completing AMD through another source. Next Step:   [] ACP scheduled conversation  [x] Outreach again in one week               [] Email / Mail ACP Info Sheets  [] Email / Mail Advance Directive   [] Closing referral.  Routing closure to referring provider/staff and to ACP Specialist . [] Closure letter mailed to patient with invitation to contact ACP Specialist if / when ready.   Thank you for this referral.

## 2023-01-01 DIAGNOSIS — E66.9 OBESITY (BMI 30-39.9): ICD-10-CM

## 2023-01-01 DIAGNOSIS — R73.03 PREDIABETES: ICD-10-CM

## 2023-01-03 RX ORDER — SEMAGLUTIDE 1.34 MG/ML
INJECTION, SOLUTION SUBCUTANEOUS
Qty: 4 PEN | Refills: 2 | Status: SHIPPED | OUTPATIENT
Start: 2023-01-03

## 2023-02-22 RX ORDER — AMLODIPINE BESYLATE 10 MG/1
10 TABLET ORAL DAILY
Qty: 90 TABLET | Refills: 1 | Status: SHIPPED | OUTPATIENT
Start: 2023-02-22

## 2023-02-22 RX ORDER — OLMESARTAN MEDOXOMIL 20 MG/1
20 TABLET ORAL DAILY
Qty: 90 TABLET | Refills: 1 | Status: SHIPPED | OUTPATIENT
Start: 2023-02-22

## 2023-03-01 ENCOUNTER — TELEPHONE (OUTPATIENT)
Dept: ENDOCRINOLOGY | Age: 73
End: 2023-03-01

## 2023-03-01 DIAGNOSIS — M81.0 AGE-RELATED OSTEOPOROSIS WITHOUT CURRENT PATHOLOGICAL FRACTURE: Primary | ICD-10-CM

## 2023-03-01 DIAGNOSIS — E55.9 VITAMIN D DEFICIENCY: ICD-10-CM

## 2023-03-01 DIAGNOSIS — M88.9 PAGET'S DISEASE OF BONE: ICD-10-CM

## 2023-03-01 NOTE — TELEPHONE ENCOUNTER
Patient concern about having infusion and needs an appt sooner than may. Please call back.  Thank you

## 2023-03-02 NOTE — TELEPHONE ENCOUNTER
was trying to move pt to May and pt is concerned about getting Reclast infusion too late. Advised pt that her last infusion was 4/29/22 so she wouldn't be due until 4/30/23. She would be ok she doesn't get the reclast right at the one year angus. Pt wants to know when she should be seen and have labs.

## 2023-03-02 NOTE — TELEPHONE ENCOUNTER
Gaye Hoff: She is not due for the Reclast until May 2023  Appointment in May, labs a week before      Emerita: Vitamin D BMP

## 2023-03-08 ENCOUNTER — VIRTUAL VISIT (OUTPATIENT)
Dept: PRIMARY CARE CLINIC | Age: 73
End: 2023-03-08
Payer: MEDICARE

## 2023-03-08 VITALS — BODY MASS INDEX: 28.8 KG/M2 | WEIGHT: 195 LBS

## 2023-03-08 DIAGNOSIS — E66.9 OBESITY (BMI 30-39.9): Primary | ICD-10-CM

## 2023-03-08 DIAGNOSIS — E78.5 DYSLIPIDEMIA: ICD-10-CM

## 2023-03-08 PROCEDURE — G8432 DEP SCR NOT DOC, RNG: HCPCS | Performed by: FAMILY MEDICINE

## 2023-03-08 PROCEDURE — 3017F COLORECTAL CA SCREEN DOC REV: CPT | Performed by: FAMILY MEDICINE

## 2023-03-08 PROCEDURE — 1123F ACP DISCUSS/DSCN MKR DOCD: CPT | Performed by: FAMILY MEDICINE

## 2023-03-08 PROCEDURE — G8427 DOCREV CUR MEDS BY ELIG CLIN: HCPCS | Performed by: FAMILY MEDICINE

## 2023-03-08 PROCEDURE — 99213 OFFICE O/P EST LOW 20 MIN: CPT | Performed by: FAMILY MEDICINE

## 2023-03-08 PROCEDURE — 1101F PT FALLS ASSESS-DOCD LE1/YR: CPT | Performed by: FAMILY MEDICINE

## 2023-03-08 PROCEDURE — 1090F PRES/ABSN URINE INCON ASSESS: CPT | Performed by: FAMILY MEDICINE

## 2023-03-08 PROCEDURE — G9899 SCRN MAM PERF RSLTS DOC: HCPCS | Performed by: FAMILY MEDICINE

## 2023-03-08 RX ORDER — ATORVASTATIN CALCIUM 20 MG/1
20 TABLET, FILM COATED ORAL
Qty: 90 TABLET | Refills: 1 | Status: SHIPPED | OUTPATIENT
Start: 2023-03-08

## 2023-03-08 NOTE — PROGRESS NOTES
Identified Patient with two Patient identifiers(name and ). 1. \"Have you been to the ER, urgent care clinic since your last visit? Hospitalized since your last visit? \" No    2. \"Have you seen or consulted any other health care providers outside of the [address] [place] [address] since your last visit? \" No     3. For patients aged 39-70: Has the patient had a colonoscopy / FIT/ Cologuard? Yes - Care Gap present. Most recent result on file      If the patient is female:    4. For patients aged 41-77: Has the patient had a mammogram within the past 2 years? Yes - Care Gap present. Most recent result on file      5. For patients aged 21-65: Has the patient had a pap smear? NA - based on age or sex     Visit Vitals  LMP  (LMP Unknown)       No chief complaint on file.       Health Maintenance Due   Topic Date Due    Colorectal Cancer Screening Combo  Never done    Shingles Vaccine (1 of 2) Never done    COVID-19 Vaccine (4 - Booster) 2022

## 2023-03-08 NOTE — PROGRESS NOTES
Gilda Mullins (: 1950) is a 71 y.o. female, established patient, here for evaluation of the following chief complaint(s):   Follow-up       ASSESSMENT/PLAN:  Below is the assessment and plan developed based on review of pertinent history, labs, studies, and medications. 1. Obesity (BMI 30-39. 9)  Comments:  Continue ozempic at 0.25mg. Pt advised I do not want her to lose much more weight as BMI close to normal. Continue exercise and add strength training. 2. Dyslipidemia  Comments:  Continue atorvastatin 20mg nightly. Orders:  -     atorvastatin (LIPITOR) 20 mg tablet; Take 1 Tablet by mouth nightly., Normal, Disp-90 Tablet, R-1    Return in about 5 months (around 2023) for Chronic: PreDiabetes, HTN, HLD. SUBJECTIVE/OBJECTIVE:  Obesity: last visit we were started on ozempic 0.25mg weekly. Patient tolerates well without vomiting. She's lost 14lb in 3 months. She is exercising three times a week. Review of Systems   Respiratory:  Negative for cough and shortness of breath. Gastrointestinal:  Negative for abdominal pain and vomiting. No data recorded     Physical Exam  Vitals and nursing note reviewed. Constitutional:       General: She is not in acute distress. Pulmonary:      Effort: Pulmonary effort is normal. No respiratory distress. Neurological:      Mental Status: She is alert. Gilda Mullins, was evaluated through a synchronous (real-time) audio-video encounter. The patient (or guardian if applicable) is aware that this is a billable service, which includes applicable co-pays. This Virtual Visit was conducted with patient's (and/or legal guardian's) consent. The visit was conducted pursuant to the emergency declaration under the [address], [address] [place] [address] authority and the [employer] and [employer] General Act. Patient identification was verified, and a caregiver was present when appropriate.   The patient was located at: Home: [address]  The provider was [facility]       An electronic signature was used to authenticate this note.   -- Brandie Hightower MD

## 2023-03-08 NOTE — PROGRESS NOTES
Ketto message sent:    Hi Ms. Ann,    I am in receipt of your last mammogram which was negative for evidence of cancer. This is good news. We will repeat it in one year.     Dr. Sherron Casanova

## 2023-04-22 DIAGNOSIS — M81.0 AGE-RELATED OSTEOPOROSIS WITHOUT CURRENT PATHOLOGICAL FRACTURE: Primary | ICD-10-CM

## 2023-04-22 DIAGNOSIS — E55.9 VITAMIN D DEFICIENCY: ICD-10-CM

## 2023-04-22 DIAGNOSIS — M88.9 PAGET'S DISEASE OF BONE: ICD-10-CM

## 2023-05-06 RX ORDER — FLUTICASONE PROPIONATE 50 MCG
SPRAY, SUSPENSION (ML) NASAL
COMMUNITY
Start: 2022-05-07

## 2023-05-06 RX ORDER — AMLODIPINE BESYLATE 10 MG/1
TABLET ORAL DAILY
COMMUNITY
Start: 2023-02-22

## 2023-05-06 RX ORDER — OLMESARTAN MEDOXOMIL 20 MG/1
TABLET ORAL DAILY
COMMUNITY
Start: 2023-02-22

## 2023-05-06 RX ORDER — SEMAGLUTIDE 1.34 MG/ML
INJECTION, SOLUTION SUBCUTANEOUS
COMMUNITY
Start: 2023-01-03

## 2023-05-06 RX ORDER — PANTOPRAZOLE SODIUM 40 MG/1
TABLET, DELAYED RELEASE ORAL
COMMUNITY
Start: 2022-02-23

## 2023-05-06 RX ORDER — ALBUTEROL SULFATE 90 UG/1
1 AEROSOL, METERED RESPIRATORY (INHALATION) EVERY 4 HOURS PRN
COMMUNITY
Start: 2019-01-25

## 2023-05-06 RX ORDER — ATORVASTATIN CALCIUM 20 MG/1
TABLET, FILM COATED ORAL
COMMUNITY
Start: 2023-03-08

## 2023-06-08 ENCOUNTER — OFFICE VISIT (OUTPATIENT)
Age: 73
End: 2023-06-08
Payer: MEDICARE

## 2023-06-08 VITALS
HEART RATE: 68 BPM | SYSTOLIC BLOOD PRESSURE: 129 MMHG | WEIGHT: 185.7 LBS | DIASTOLIC BLOOD PRESSURE: 71 MMHG | TEMPERATURE: 97.7 F | HEIGHT: 69 IN | BODY MASS INDEX: 27.5 KG/M2 | RESPIRATION RATE: 18 BRPM | OXYGEN SATURATION: 98 %

## 2023-06-08 DIAGNOSIS — E55.9 VITAMIN D DEFICIENCY: ICD-10-CM

## 2023-06-08 DIAGNOSIS — M88.9 PAGET'S DISEASE OF BONE: ICD-10-CM

## 2023-06-08 DIAGNOSIS — M81.0 OSTEOPOROSIS WITHOUT PATHOLOGICAL FRACTURE: ICD-10-CM

## 2023-06-08 DIAGNOSIS — M81.0 AGE-RELATED OSTEOPOROSIS WITHOUT CURRENT PATHOLOGICAL FRACTURE: Primary | ICD-10-CM

## 2023-06-08 DIAGNOSIS — M25.551 PAIN OF RIGHT HIP: ICD-10-CM

## 2023-06-08 PROCEDURE — 1036F TOBACCO NON-USER: CPT | Performed by: INTERNAL MEDICINE

## 2023-06-08 PROCEDURE — G8427 DOCREV CUR MEDS BY ELIG CLIN: HCPCS | Performed by: INTERNAL MEDICINE

## 2023-06-08 PROCEDURE — G8419 CALC BMI OUT NRM PARAM NOF/U: HCPCS | Performed by: INTERNAL MEDICINE

## 2023-06-08 PROCEDURE — 99214 OFFICE O/P EST MOD 30 MIN: CPT | Performed by: INTERNAL MEDICINE

## 2023-06-08 PROCEDURE — 3017F COLORECTAL CA SCREEN DOC REV: CPT | Performed by: INTERNAL MEDICINE

## 2023-06-08 PROCEDURE — G8399 PT W/DXA RESULTS DOCUMENT: HCPCS | Performed by: INTERNAL MEDICINE

## 2023-06-08 PROCEDURE — 3074F SYST BP LT 130 MM HG: CPT | Performed by: INTERNAL MEDICINE

## 2023-06-08 PROCEDURE — 1090F PRES/ABSN URINE INCON ASSESS: CPT | Performed by: INTERNAL MEDICINE

## 2023-06-08 PROCEDURE — 3078F DIAST BP <80 MM HG: CPT | Performed by: INTERNAL MEDICINE

## 2023-06-08 PROCEDURE — 1123F ACP DISCUSS/DSCN MKR DOCD: CPT | Performed by: INTERNAL MEDICINE

## 2023-06-08 NOTE — PROGRESS NOTES
Quinn Benito is a 71 y.o. female here for   Chief Complaint   Patient presents with    Osteoporosis     Osteitis deformans        1. Have you been to the ER or an urgent care clinic since your last visit?  - no     2. Have you been hospitalized since your last visit? - no     3. Have you seen or consulted any other health care providers outside of the [address] [place] [address] since your last visit?   Include any pap smears or colon screening.- no
flulike symptoms which lasted for more than a week  She could not tolerate oral bisphosphonates, history of GERD, PUD  Discussed about IV bisphosphonates versus Prolia, pros and cons discussed. Some patients may not have any symptoms with the second Reclast, could pretreat  Given Paget's she cannot take anabolic's-Forteo or Tymlos. Fall precautions. Weight bearing exercises helps. Excess alcohol and smoking weakens the bone and hence should be avoided. Reclast April 2022, continue Reclast     Paget's disease: Bone scan to assess the extent of involvement was ordered which was not completed. MRI spine showed right pelvic involvement several years ago  S/p IV Reclast April 2021, had flulike symptoms. She could not tolerate oral bisphosphonates. Bisphosphonates are better for Paget's. Usually 1 dose is sufficient and most of the situations. But for osteoporosis she needs ongoing antiresorptive's     Vitamin D deficiency     Right thigh pain: X-rays, hairline fracture, atypical fracture    Thank you for allowing me to participate in the care of this patient. Keerthi Lewis MD     Patient Eliu Shaffer verbalized understanding     Voice-recognition software was used to generate this report, which may result in some phonetic-based errors in the grammar and contents. Even though attempts were made to correct  all the mistakes, some may have been missed and remained in the body of the report.

## 2023-06-09 ENCOUNTER — TELEPHONE (OUTPATIENT)
Age: 73
End: 2023-06-09

## 2023-06-09 NOTE — TELEPHONE ENCOUNTER
Patient is anxious to get results from xrays advised to watch on her my chart though results have not yet been released or reviewed at this time

## 2023-06-09 NOTE — TELEPHONE ENCOUNTER
Called pt to reassure her that once results were in we would call sometime next week. Pt gave a verbal understanding with no further concerns.

## 2023-07-14 DIAGNOSIS — M81.8 OTHER OSTEOPOROSIS WITHOUT CURRENT PATHOLOGICAL FRACTURE: Primary | ICD-10-CM

## 2023-07-14 RX ORDER — EPINEPHRINE 1 MG/ML
0.3 INJECTION, SOLUTION, CONCENTRATE INTRAVENOUS PRN
Status: CANCELLED | OUTPATIENT
Start: 2023-07-21

## 2023-07-14 RX ORDER — SODIUM CHLORIDE 9 MG/ML
5-250 INJECTION, SOLUTION INTRAVENOUS PRN
Status: CANCELLED | OUTPATIENT
Start: 2023-07-21

## 2023-07-14 RX ORDER — ONDANSETRON 2 MG/ML
8 INJECTION INTRAMUSCULAR; INTRAVENOUS
Status: CANCELLED | OUTPATIENT
Start: 2023-07-21

## 2023-07-14 RX ORDER — HEPARIN SODIUM (PORCINE) LOCK FLUSH IV SOLN 100 UNIT/ML 100 UNIT/ML
500 SOLUTION INTRAVENOUS PRN
Status: CANCELLED | OUTPATIENT
Start: 2023-07-21

## 2023-07-14 RX ORDER — SODIUM CHLORIDE 0.9 % (FLUSH) 0.9 %
5-40 SYRINGE (ML) INJECTION PRN
Status: CANCELLED | OUTPATIENT
Start: 2023-07-21

## 2023-07-14 RX ORDER — ZOLEDRONIC ACID 5 MG/100ML
5 INJECTION, SOLUTION INTRAVENOUS ONCE
Status: CANCELLED | OUTPATIENT
Start: 2023-07-21 | End: 2023-07-21

## 2023-07-14 RX ORDER — DIPHENHYDRAMINE HYDROCHLORIDE 50 MG/ML
50 INJECTION INTRAMUSCULAR; INTRAVENOUS
Status: CANCELLED | OUTPATIENT
Start: 2023-07-21

## 2023-07-14 RX ORDER — FAMOTIDINE 10 MG/ML
20 INJECTION, SOLUTION INTRAVENOUS
Status: CANCELLED | OUTPATIENT
Start: 2023-07-21

## 2023-07-14 RX ORDER — ACETAMINOPHEN 325 MG/1
650 TABLET ORAL
Status: CANCELLED | OUTPATIENT
Start: 2023-07-21

## 2023-07-14 RX ORDER — SODIUM CHLORIDE 9 MG/ML
INJECTION, SOLUTION INTRAVENOUS CONTINUOUS
Status: CANCELLED | OUTPATIENT
Start: 2023-07-21

## 2023-07-14 RX ORDER — ALBUTEROL SULFATE 90 UG/1
4 AEROSOL, METERED RESPIRATORY (INHALATION) PRN
Status: CANCELLED | OUTPATIENT
Start: 2023-07-21

## 2023-07-21 ENCOUNTER — HOSPITAL ENCOUNTER (OUTPATIENT)
Facility: HOSPITAL | Age: 73
Setting detail: INFUSION SERIES
End: 2023-07-21
Payer: MEDICARE

## 2023-07-21 VITALS
HEIGHT: 69 IN | DIASTOLIC BLOOD PRESSURE: 61 MMHG | HEART RATE: 69 BPM | RESPIRATION RATE: 18 BRPM | OXYGEN SATURATION: 96 % | BODY MASS INDEX: 27.89 KG/M2 | TEMPERATURE: 97.9 F | WEIGHT: 188.3 LBS | SYSTOLIC BLOOD PRESSURE: 120 MMHG

## 2023-07-21 DIAGNOSIS — M81.8 OTHER OSTEOPOROSIS WITHOUT CURRENT PATHOLOGICAL FRACTURE: Primary | ICD-10-CM

## 2023-07-21 LAB
ANION GAP BLD CALC-SCNC: 9 MMOL/L (ref 10–20)
CA-I BLD-MCNC: 1.24 MMOL/L (ref 1.12–1.32)
CHLORIDE BLD-SCNC: 106 MMOL/L (ref 98–107)
CO2 BLD-SCNC: 25.6 MMOL/L (ref 21–32)
CREAT BLD-MCNC: 0.89 MG/DL (ref 0.6–1.3)
GLUCOSE BLD-MCNC: 92 MG/DL (ref 65–100)
POTASSIUM BLD-SCNC: 4.4 MMOL/L (ref 3.5–5.1)
SERVICE CMNT-IMP: ABNORMAL
SODIUM BLD-SCNC: 140 MMOL/L (ref 136–145)

## 2023-07-21 PROCEDURE — 80047 BASIC METABLC PNL IONIZED CA: CPT

## 2023-07-21 PROCEDURE — 2580000003 HC RX 258: Performed by: INTERNAL MEDICINE

## 2023-07-21 PROCEDURE — 96374 THER/PROPH/DIAG INJ IV PUSH: CPT

## 2023-07-21 PROCEDURE — 6360000002 HC RX W HCPCS: Performed by: INTERNAL MEDICINE

## 2023-07-21 RX ORDER — ONDANSETRON 2 MG/ML
8 INJECTION INTRAMUSCULAR; INTRAVENOUS
OUTPATIENT
Start: 2024-07-19

## 2023-07-21 RX ORDER — ZOLEDRONIC ACID 5 MG/100ML
5 INJECTION, SOLUTION INTRAVENOUS ONCE
OUTPATIENT
Start: 2024-07-19 | End: 2024-07-19

## 2023-07-21 RX ORDER — EPINEPHRINE 1 MG/ML
0.3 INJECTION, SOLUTION, CONCENTRATE INTRAVENOUS PRN
OUTPATIENT
Start: 2024-07-19

## 2023-07-21 RX ORDER — ALBUTEROL SULFATE 90 UG/1
4 AEROSOL, METERED RESPIRATORY (INHALATION) PRN
OUTPATIENT
Start: 2024-07-19

## 2023-07-21 RX ORDER — DIPHENHYDRAMINE HYDROCHLORIDE 50 MG/ML
50 INJECTION INTRAMUSCULAR; INTRAVENOUS
OUTPATIENT
Start: 2024-07-19

## 2023-07-21 RX ORDER — HEPARIN 100 UNIT/ML
500 SYRINGE INTRAVENOUS PRN
OUTPATIENT
Start: 2024-07-19

## 2023-07-21 RX ORDER — SODIUM CHLORIDE 9 MG/ML
5-250 INJECTION, SOLUTION INTRAVENOUS PRN
OUTPATIENT
Start: 2024-07-19

## 2023-07-21 RX ORDER — ACETAMINOPHEN 325 MG/1
650 TABLET ORAL
OUTPATIENT
Start: 2024-07-19

## 2023-07-21 RX ORDER — SODIUM CHLORIDE 9 MG/ML
5-250 INJECTION, SOLUTION INTRAVENOUS PRN
Status: DISCONTINUED | OUTPATIENT
Start: 2023-07-21 | End: 2023-07-22 | Stop reason: HOSPADM

## 2023-07-21 RX ORDER — SODIUM CHLORIDE 9 MG/ML
INJECTION, SOLUTION INTRAVENOUS CONTINUOUS
OUTPATIENT
Start: 2024-07-19

## 2023-07-21 RX ORDER — SODIUM CHLORIDE 0.9 % (FLUSH) 0.9 %
5-40 SYRINGE (ML) INJECTION PRN
OUTPATIENT
Start: 2024-07-19

## 2023-07-21 RX ORDER — ZOLEDRONIC ACID 5 MG/100ML
5 INJECTION, SOLUTION INTRAVENOUS ONCE
Status: COMPLETED | OUTPATIENT
Start: 2023-07-21 | End: 2023-07-21

## 2023-07-21 RX ADMIN — SODIUM CHLORIDE 25 ML/HR: 9 INJECTION, SOLUTION INTRAVENOUS at 16:32

## 2023-07-21 RX ADMIN — ZOLEDRONIC ACID 5 MG: 0.05 INJECTION, SOLUTION INTRAVENOUS at 16:43

## 2023-07-21 ASSESSMENT — PAIN SCALES - GENERAL: PAINLEVEL_OUTOF10: 0

## 2023-09-01 NOTE — TELEPHONE ENCOUNTER
[address] is requesting a refill on atorvastatin and amlodipine.      Patient's last appointment was 3/8/2023  Next visit is scheduled for 9/7/2023   Please send medication to:   [facility] [address], [address] [facility]  [address] [facility] [address]  Phone: [phone] Fax: [phone]

## 2023-09-05 RX ORDER — ATORVASTATIN CALCIUM 20 MG/1
20 TABLET, FILM COATED ORAL DAILY
Qty: 30 TABLET | Refills: 2 | Status: SHIPPED | OUTPATIENT
Start: 2023-09-05

## 2023-09-05 RX ORDER — AMLODIPINE BESYLATE 10 MG/1
10 TABLET ORAL DAILY
Qty: 30 TABLET | Refills: 2 | Status: SHIPPED | OUTPATIENT
Start: 2023-09-05 | End: 2023-09-07 | Stop reason: SDUPTHER

## 2023-09-07 ENCOUNTER — OFFICE VISIT (OUTPATIENT)
Dept: PRIMARY CARE CLINIC | Facility: CLINIC | Age: 73
End: 2023-09-07
Payer: MEDICARE

## 2023-09-07 VITALS
HEIGHT: 69 IN | BODY MASS INDEX: 28.47 KG/M2 | WEIGHT: 192.2 LBS | SYSTOLIC BLOOD PRESSURE: 146 MMHG | OXYGEN SATURATION: 98 % | HEART RATE: 85 BPM | TEMPERATURE: 98.8 F | DIASTOLIC BLOOD PRESSURE: 86 MMHG | RESPIRATION RATE: 16 BRPM

## 2023-09-07 DIAGNOSIS — E78.2 MIXED HYPERLIPIDEMIA: ICD-10-CM

## 2023-09-07 DIAGNOSIS — E55.9 VITAMIN D DEFICIENCY: ICD-10-CM

## 2023-09-07 DIAGNOSIS — M88.9 PAGET DISEASE OF BONE: ICD-10-CM

## 2023-09-07 DIAGNOSIS — I10 ESSENTIAL (PRIMARY) HYPERTENSION: Primary | ICD-10-CM

## 2023-09-07 DIAGNOSIS — R73.03 PREDIABETES: ICD-10-CM

## 2023-09-07 PROCEDURE — 3079F DIAST BP 80-89 MM HG: CPT | Performed by: NURSE PRACTITIONER

## 2023-09-07 PROCEDURE — 3077F SYST BP >= 140 MM HG: CPT | Performed by: NURSE PRACTITIONER

## 2023-09-07 PROCEDURE — 1123F ACP DISCUSS/DSCN MKR DOCD: CPT | Performed by: NURSE PRACTITIONER

## 2023-09-07 PROCEDURE — 99214 OFFICE O/P EST MOD 30 MIN: CPT | Performed by: NURSE PRACTITIONER

## 2023-09-07 RX ORDER — OLMESARTAN MEDOXOMIL 20 MG/1
20 TABLET ORAL DAILY
Qty: 90 TABLET | Refills: 1 | Status: SHIPPED | OUTPATIENT
Start: 2023-09-07

## 2023-09-07 RX ORDER — AMLODIPINE BESYLATE 10 MG/1
10 TABLET ORAL DAILY
Qty: 90 TABLET | Refills: 1 | Status: SHIPPED | OUTPATIENT
Start: 2023-09-07

## 2023-09-07 SDOH — ECONOMIC STABILITY: INCOME INSECURITY: HOW HARD IS IT FOR YOU TO PAY FOR THE VERY BASICS LIKE FOOD, HOUSING, MEDICAL CARE, AND HEATING?: NOT VERY HARD

## 2023-09-07 SDOH — ECONOMIC STABILITY: HOUSING INSECURITY
IN THE LAST 12 MONTHS, WAS THERE A TIME WHEN YOU DID NOT HAVE A STEADY PLACE TO SLEEP OR SLEPT IN A SHELTER (INCLUDING NOW)?: NO

## 2023-09-07 SDOH — ECONOMIC STABILITY: FOOD INSECURITY: WITHIN THE PAST 12 MONTHS, THE FOOD YOU BOUGHT JUST DIDN'T LAST AND YOU DIDN'T HAVE MONEY TO GET MORE.: NEVER TRUE

## 2023-09-07 SDOH — ECONOMIC STABILITY: FOOD INSECURITY: WITHIN THE PAST 12 MONTHS, YOU WORRIED THAT YOUR FOOD WOULD RUN OUT BEFORE YOU GOT MONEY TO BUY MORE.: NEVER TRUE

## 2023-09-07 ASSESSMENT — ENCOUNTER SYMPTOMS
RESPIRATORY NEGATIVE: 1
GASTROINTESTINAL NEGATIVE: 1

## 2023-09-07 NOTE — PROGRESS NOTES
Identified Patient with two Patient identifiers(name and ). 1. Have you been to the ER, urgent care clinic since your last visit? Hospitalized since your last visit? No    2. Have you seen or consulted any other health care providers outside of the [address] [place] [address] since your last visit? No     3. For patients aged 43-73: Has the patient had a colonoscopy / FIT/ Cologuard? Yes-No Care Gap Present    If the patient is female:    4. For patients aged 43-66: Has the patient had a mammogram within the past 2 years? Yes-No Care Gap Present      5. For patients aged 21-65: Has the patient had a pap smear? No   There were no vitals taken for this visit. Chief Complaint   Patient presents with    Follow-up     Lab/ Medication follow up       Health Maintenance Due   Topic Date Due    Colorectal Cancer Screen  Never done    Shingles vaccine (1 of 2) Never done    COVID-19 Vaccine (3 - Booster for Pfizer series) 2022    Flu vaccine (1) 2023          No questionnaires available.
BRAIN ANEURYSM    Anesth Problems Neg Hx     Hypertension Brother      Social History     Tobacco Use    Smoking status: Never    Smokeless tobacco: Never   Substance Use Topics    Alcohol use: Yes     Alcohol/week: 4.0 standard drinks           Review of Systems   Constitutional: Negative. HENT: Negative. Respiratory: Negative. Cardiovascular: Negative. Gastrointestinal: Negative. Genitourinary: Negative. Musculoskeletal: Negative. Skin: Negative. Neurological: Negative. Psychiatric/Behavioral: Negative. Physical Exam  Constitutional:       General: She is not in acute distress. Appearance: Normal appearance. HENT:      Head: Normocephalic. Right Ear: External ear normal.      Left Ear: External ear normal.      Nose: Nose normal.   Eyes:      Extraocular Movements: Extraocular movements intact. Conjunctiva/sclera: Conjunctivae normal.   Cardiovascular:      Rate and Rhythm: Normal rate and regular rhythm. Heart sounds: Normal heart sounds. Pulmonary:      Effort: Pulmonary effort is normal.      Breath sounds: Normal breath sounds. Abdominal:      General: Bowel sounds are normal.   Skin:     General: Skin is warm. Neurological:      Mental Status: She is alert and oriented to person, place, and time. Mental status is at baseline.    Psychiatric:         Mood and Affect: Mood normal.         Behavior: Behavior normal.             Dotty Becerra, APRN - CNP

## 2023-09-08 LAB
25(OH)D3+25(OH)D2 SERPL-MCNC: 22.5 NG/ML (ref 30–100)
ALBUMIN SERPL-MCNC: 4.3 G/DL (ref 3.8–4.8)
ALBUMIN/GLOB SERPL: 1.5 {RATIO} (ref 1.2–2.2)
ALP SERPL-CCNC: 62 IU/L (ref 44–121)
ALT SERPL-CCNC: 16 IU/L (ref 0–32)
AST SERPL-CCNC: 21 IU/L (ref 0–40)
BILIRUB SERPL-MCNC: 0.4 MG/DL (ref 0–1.2)
BUN SERPL-MCNC: 15 MG/DL (ref 8–27)
BUN/CREAT SERPL: 19 (ref 12–28)
CALCIUM SERPL-MCNC: 9.8 MG/DL (ref 8.7–10.3)
CHLORIDE SERPL-SCNC: 102 MMOL/L (ref 96–106)
CHOLEST SERPL-MCNC: 230 MG/DL (ref 100–199)
CO2 SERPL-SCNC: 21 MMOL/L (ref 20–29)
CREAT SERPL-MCNC: 0.79 MG/DL (ref 0.57–1)
EGFRCR SERPLBLD CKD-EPI 2021: 79 ML/MIN/1.73
ERYTHROCYTE [DISTWIDTH] IN BLOOD BY AUTOMATED COUNT: 14.6 % (ref 11.7–15.4)
GLOBULIN SER CALC-MCNC: 2.8 G/DL (ref 1.5–4.5)
GLUCOSE SERPL-MCNC: 84 MG/DL (ref 70–99)
HBA1C MFR BLD: 5.8 % (ref 4.8–5.6)
HCT VFR BLD AUTO: 36.5 % (ref 34–46.6)
HDLC SERPL-MCNC: 72 MG/DL
HGB BLD-MCNC: 11.9 G/DL (ref 11.1–15.9)
LDLC SERPL CALC-MCNC: 129 MG/DL (ref 0–99)
MCH RBC QN AUTO: 28.1 PG (ref 26.6–33)
MCHC RBC AUTO-ENTMCNC: 32.6 G/DL (ref 31.5–35.7)
MCV RBC AUTO: 86 FL (ref 79–97)
PLATELET # BLD AUTO: 280 X10E3/UL (ref 150–450)
POTASSIUM SERPL-SCNC: 4.7 MMOL/L (ref 3.5–5.2)
PROT SERPL-MCNC: 7.1 G/DL (ref 6–8.5)
RBC # BLD AUTO: 4.24 X10E6/UL (ref 3.77–5.28)
SODIUM SERPL-SCNC: 141 MMOL/L (ref 134–144)
TRIGL SERPL-MCNC: 167 MG/DL (ref 0–149)
VLDLC SERPL CALC-MCNC: 29 MG/DL (ref 5–40)
WBC # BLD AUTO: 5 X10E3/UL (ref 3.4–10.8)

## 2023-10-27 ENCOUNTER — TELEPHONE (OUTPATIENT)
Dept: PRIMARY CARE CLINIC | Facility: CLINIC | Age: 73
End: 2023-10-27

## 2023-10-27 NOTE — TELEPHONE ENCOUNTER
Camryn Nevarez is requesting a refill on ozempic.      Patient's last appointment was 9/7/2023  Next visit is scheduled for 12/21/2023   Please send medication to:   [facility] [address], [address] [facility]  [address] [facility] [place] [address]  Phone: [phone] Fax: [phone]

## 2023-10-30 ENCOUNTER — TELEPHONE (OUTPATIENT)
Dept: PRIMARY CARE CLINIC | Facility: CLINIC | Age: 73
End: 2023-10-30

## 2023-10-30 DIAGNOSIS — R73.03 PREDIABETES: Primary | ICD-10-CM

## 2023-10-30 RX ORDER — SEMAGLUTIDE 1.34 MG/ML
INJECTION, SOLUTION SUBCUTANEOUS
Qty: 1.5 ML | Refills: 1 | Status: SHIPPED | OUTPATIENT
Start: 2023-10-30 | End: 2023-10-31 | Stop reason: SDUPTHER

## 2023-10-31 DIAGNOSIS — R73.03 PREDIABETES: ICD-10-CM

## 2023-10-31 RX ORDER — SEMAGLUTIDE 1.34 MG/ML
INJECTION, SOLUTION SUBCUTANEOUS
Qty: 3 ML | Refills: 1 | Status: SHIPPED | OUTPATIENT
Start: 2023-10-31

## 2023-11-01 ENCOUNTER — NURSE TRIAGE (OUTPATIENT)
Dept: OTHER | Facility: CLINIC | Age: 73
End: 2023-11-01

## 2023-11-01 ENCOUNTER — TELEPHONE (OUTPATIENT)
Dept: PRIMARY CARE CLINIC | Facility: CLINIC | Age: 73
End: 2023-11-01

## 2023-11-01 NOTE — TELEPHONE ENCOUNTER
Location of patient: [address] [facility] [place] call from [place] at [facility] with [email or web address]. Subjective: Caller states     Current Symptoms:   Tingling in right leg  after a fall that was addressed at ED 1 week ago  Tingling started 1 day ago side of knee and behind knee  Pain in right leg  Tingling comes and goes  Painful when walking     Onset:  1 day ago    Denies:  Swelling   Redness  Warmth to touch  Bruising  numbness    Pain Severity: 7/10    Temperature:  denies    What has been tried: tylenol, ice, and heat, compression wrap     Recommended disposition: See PCP within 3 Days    Care advice provided, patient verbalizes understanding; denies any other questions or concerns; instructed to call back for any new or worsening symptoms. Patient/caller agrees to follow-up with PCP     Attention Provider: Thank you for allowing me to participate in the care of your patient. The patient was connected to triage in response to information provided to the ECC/PSC. Please do not respond through this encounter as the response is not directed to a shared pool.     Reason for Disposition   [1] MODERATE pain (e.g., interferes with normal activities, limping) AND [2] present > 3 days    Protocols used: Leg Pain-ADULT-AH

## 2023-11-01 NOTE — TELEPHONE ENCOUNTER
Patient called and stated that she had fallen about a week ago and injured her leg. Patient went to [facility] and is still in pain, asking if she can be seen for the leg pain.

## 2023-11-08 ENCOUNTER — OFFICE VISIT (OUTPATIENT)
Dept: PRIMARY CARE CLINIC | Facility: CLINIC | Age: 73
End: 2023-11-08
Payer: MEDICARE

## 2023-11-08 VITALS
OXYGEN SATURATION: 98 % | RESPIRATION RATE: 16 BRPM | WEIGHT: 192.4 LBS | TEMPERATURE: 98.3 F | HEIGHT: 68 IN | DIASTOLIC BLOOD PRESSURE: 79 MMHG | HEART RATE: 66 BPM | SYSTOLIC BLOOD PRESSURE: 135 MMHG | BODY MASS INDEX: 29.16 KG/M2

## 2023-11-08 DIAGNOSIS — M25.561 ACUTE PAIN OF RIGHT KNEE: Primary | ICD-10-CM

## 2023-11-08 PROCEDURE — 3078F DIAST BP <80 MM HG: CPT | Performed by: NURSE PRACTITIONER

## 2023-11-08 PROCEDURE — 3075F SYST BP GE 130 - 139MM HG: CPT | Performed by: NURSE PRACTITIONER

## 2023-11-08 PROCEDURE — 1123F ACP DISCUSS/DSCN MKR DOCD: CPT | Performed by: NURSE PRACTITIONER

## 2023-11-08 PROCEDURE — 99213 OFFICE O/P EST LOW 20 MIN: CPT | Performed by: NURSE PRACTITIONER

## 2023-11-08 RX ORDER — IBUPROFEN 800 MG/1
800 TABLET ORAL EVERY 8 HOURS PRN
Qty: 30 TABLET | Refills: 1 | Status: SHIPPED | OUTPATIENT
Start: 2023-11-08

## 2023-12-20 LAB — MAMMOGRAPHY, EXTERNAL: NORMAL

## 2024-01-12 DIAGNOSIS — R73.03 PREDIABETES: ICD-10-CM

## 2024-01-15 RX ORDER — SEMAGLUTIDE 0.68 MG/ML
INJECTION, SOLUTION SUBCUTANEOUS
Qty: 3 ML | Refills: 1 | Status: SHIPPED | OUTPATIENT
Start: 2024-01-15

## 2024-01-26 ENCOUNTER — TELEPHONE (OUTPATIENT)
Dept: PRIMARY CARE CLINIC | Facility: CLINIC | Age: 74
End: 2024-01-26

## 2024-01-26 NOTE — TELEPHONE ENCOUNTER
Pt called in ref to Ozempic PA/ states that she has been on medication for 1 year, advised that PA will be done with [facility]   Wait for Determination  Please wait for [employer] NCPDP 2017 to return a determination.

## 2024-02-06 ENCOUNTER — OFFICE VISIT (OUTPATIENT)
Dept: PRIMARY CARE CLINIC | Facility: CLINIC | Age: 74
End: 2024-02-06
Payer: MEDICARE

## 2024-02-06 VITALS
WEIGHT: 194.4 LBS | TEMPERATURE: 98.5 F | BODY MASS INDEX: 28.79 KG/M2 | HEART RATE: 69 BPM | SYSTOLIC BLOOD PRESSURE: 139 MMHG | HEIGHT: 69 IN | DIASTOLIC BLOOD PRESSURE: 75 MMHG

## 2024-02-06 DIAGNOSIS — G89.29 CHRONIC PAIN OF RIGHT KNEE: ICD-10-CM

## 2024-02-06 DIAGNOSIS — R73.03 PREDIABETES: ICD-10-CM

## 2024-02-06 DIAGNOSIS — R22.40 MASS OF SHIN: ICD-10-CM

## 2024-02-06 DIAGNOSIS — M25.561 CHRONIC PAIN OF RIGHT KNEE: ICD-10-CM

## 2024-02-06 DIAGNOSIS — Z00.00 MEDICARE ANNUAL WELLNESS VISIT, SUBSEQUENT: Primary | ICD-10-CM

## 2024-02-06 DIAGNOSIS — L85.3 DRY SKIN: ICD-10-CM

## 2024-02-06 PROCEDURE — 3078F DIAST BP <80 MM HG: CPT | Performed by: NURSE PRACTITIONER

## 2024-02-06 PROCEDURE — G8399 PT W/DXA RESULTS DOCUMENT: HCPCS | Performed by: NURSE PRACTITIONER

## 2024-02-06 PROCEDURE — 99213 OFFICE O/P EST LOW 20 MIN: CPT | Performed by: NURSE PRACTITIONER

## 2024-02-06 PROCEDURE — G0439 PPPS, SUBSEQ VISIT: HCPCS | Performed by: NURSE PRACTITIONER

## 2024-02-06 PROCEDURE — 3075F SYST BP GE 130 - 139MM HG: CPT | Performed by: NURSE PRACTITIONER

## 2024-02-06 PROCEDURE — 1090F PRES/ABSN URINE INCON ASSESS: CPT | Performed by: NURSE PRACTITIONER

## 2024-02-06 PROCEDURE — G8484 FLU IMMUNIZE NO ADMIN: HCPCS | Performed by: NURSE PRACTITIONER

## 2024-02-06 PROCEDURE — 1036F TOBACCO NON-USER: CPT | Performed by: NURSE PRACTITIONER

## 2024-02-06 PROCEDURE — 1123F ACP DISCUSS/DSCN MKR DOCD: CPT | Performed by: NURSE PRACTITIONER

## 2024-02-06 PROCEDURE — 3017F COLORECTAL CA SCREEN DOC REV: CPT | Performed by: NURSE PRACTITIONER

## 2024-02-06 PROCEDURE — G8427 DOCREV CUR MEDS BY ELIG CLIN: HCPCS | Performed by: NURSE PRACTITIONER

## 2024-02-06 PROCEDURE — G8419 CALC BMI OUT NRM PARAM NOF/U: HCPCS | Performed by: NURSE PRACTITIONER

## 2024-02-06 RX ORDER — TRIAMCINOLONE ACETONIDE 1 MG/G
OINTMENT TOPICAL
Qty: 80 G | Refills: 0 | Status: SHIPPED | OUTPATIENT
Start: 2024-02-06

## 2024-02-06 RX ORDER — SYRING-NEEDL,DISP,INSUL,0.3 ML 30 GX5/16"
1 SYRINGE, EMPTY DISPOSABLE MISCELLANEOUS ONCE
Qty: 100 EACH | Refills: 3 | Status: SHIPPED | OUTPATIENT
Start: 2024-02-06 | End: 2024-02-06

## 2024-02-06 RX ORDER — SEMAGLUTIDE 0.68 MG/ML
INJECTION, SOLUTION SUBCUTANEOUS
Qty: 3 ML | Refills: 1 | Status: SHIPPED | OUTPATIENT
Start: 2024-02-06

## 2024-02-06 RX ORDER — BLOOD-GLUCOSE METER
1 KIT MISCELLANEOUS DAILY
Qty: 1 KIT | Refills: 0 | Status: SHIPPED | OUTPATIENT
Start: 2024-02-06

## 2024-02-06 RX ORDER — GLUCOSAMINE HCL/CHONDROITIN SU 500-400 MG
CAPSULE ORAL
Qty: 100 STRIP | Refills: 3 | Status: SHIPPED | OUTPATIENT
Start: 2024-02-06

## 2024-02-06 SDOH — ECONOMIC STABILITY: FOOD INSECURITY: WITHIN THE PAST 12 MONTHS, THE FOOD YOU BOUGHT JUST DIDN'T LAST AND YOU DIDN'T HAVE MONEY TO GET MORE.: NEVER TRUE

## 2024-02-06 SDOH — ECONOMIC STABILITY: FOOD INSECURITY: WITHIN THE PAST 12 MONTHS, YOU WORRIED THAT YOUR FOOD WOULD RUN OUT BEFORE YOU GOT MONEY TO BUY MORE.: NEVER TRUE

## 2024-02-06 SDOH — ECONOMIC STABILITY: INCOME INSECURITY: HOW HARD IS IT FOR YOU TO PAY FOR THE VERY BASICS LIKE FOOD, HOUSING, MEDICAL CARE, AND HEATING?: NOT HARD AT ALL

## 2024-02-06 ASSESSMENT — LIFESTYLE VARIABLES
HAVE YOU OR SOMEONE ELSE BEEN INJURED AS A RESULT OF YOUR DRINKING: 0
HOW OFTEN DURING THE LAST YEAR HAVE YOU NEEDED AN ALCOHOLIC DRINK FIRST THING IN THE MORNING TO GET YOURSELF GOING AFTER A NIGHT OF HEAVY DRINKING: 0
HOW OFTEN DURING THE LAST YEAR HAVE YOU HAD A FEELING OF GUILT OR REMORSE AFTER DRINKING: 0
HOW OFTEN DURING THE LAST YEAR HAVE YOU FAILED TO DO WHAT WAS NORMALLY EXPECTED FROM YOU BECAUSE OF DRINKING: 0
HOW OFTEN DO YOU HAVE A DRINK CONTAINING ALCOHOL: 2-3 TIMES A WEEK
HOW OFTEN DURING THE LAST YEAR HAVE YOU BEEN UNABLE TO REMEMBER WHAT HAPPENED THE NIGHT BEFORE BECAUSE YOU HAD BEEN DRINKING: 0
HAS A RELATIVE, FRIEND, DOCTOR, OR ANOTHER HEALTH PROFESSIONAL EXPRESSED CONCERN ABOUT YOUR DRINKING OR SUGGESTED YOU CUT DOWN: 0
HOW MANY STANDARD DRINKS CONTAINING ALCOHOL DO YOU HAVE ON A TYPICAL DAY: 3 OR 4
HOW OFTEN DURING THE LAST YEAR HAVE YOU FOUND THAT YOU WERE NOT ABLE TO STOP DRINKING ONCE YOU HAD STARTED: 0

## 2024-02-06 ASSESSMENT — PATIENT HEALTH QUESTIONNAIRE - PHQ9
1. LITTLE INTEREST OR PLEASURE IN DOING THINGS: 0
2. FEELING DOWN, DEPRESSED OR HOPELESS: 0
SUM OF ALL RESPONSES TO PHQ9 QUESTIONS 1 & 2: 0
SUM OF ALL RESPONSES TO PHQ QUESTIONS 1-9: 0

## 2024-02-06 NOTE — PATIENT INSTRUCTIONS
9 Ways to Cut Back on Drinking  Maybe you've found yourself drinking more alcohol than you'd prefer. If you want to cut back, here are some ideas to try.    Think before you drink.  Do you really want a drink, or is it just a habit? If you're used to having a drink at a certain time, try doing something else then.     Look for substitutes.  Find some no-alcohol drinks that you enjoy, like flavored seltzer water, tea with honey, or tonic with a slice of lime. Or try alcohol-free beer or \"virgin\" cocktails (without the alcohol).     Drink more water.  Use water to quench your thirst. Drink a glass of water before you have any alcohol. Have another glass along with every drink or between drinks.     Shrink your drink.  For example, have a bottle of beer instead of a pint. Use a smaller glass for wine. Choose drinks with lower alcohol content (ABV%). Or use less liquor and more mixer in cocktails.     Slow down.  It's easy to drink quickly and without thinking about it. Pay attention, and make each drink last longer.     Do the math.  Total up how much you spend on alcohol each month. How much is that a year? If you cut back, what could you do with the money you save?     Take a break.  Choose a day or two each week when you won't drink at all. Notice how you feel on those days, physically and emotionally. How did you sleep? Do you feel better? Over time, add more break days.     Count calories.  Would you like to lose some weight? For some people that's a good motivator for cutting back. Figure out how many calories are in each drink. How many does that add up to in a day? In a week? In a month?     Practice saying no.  Be ready when someone offers you a drink. Try: \"Thanks, I've had enough.\" Or \"Thanks, but I'm cutting back.\" Or \"No, thanks. I feel better when I drink less.\"   Current as of: March 21, 2023               Content Version: 13.9  © 7341-3999 [employer], Incorporated.   Care instructions adapted under

## 2024-02-06 NOTE — PROGRESS NOTES
Identified Patient with two Patient identifiers(name and ).     1. Have you been to the ER, urgent care clinic since your last visit?  Hospitalized since your last visit?No    2. Have you seen or consulted any other health care providers outside of the [facility] since your last visit?   No     3. For patients aged 45-75: Has the patient had a colonoscopy / FIT/ Cologuard? No    If the patient is female:    4. For patients aged 40-74: Has the patient had a mammogram within the past 2 years?  Yes-No Care Gap Present      5. For patients aged 21-65: Has the patient had a pap smear?  No   There were no vitals taken for this visit.    Chief Complaint   Patient presents with    Other     Spot on back hurts a little feels like something is pressing. Knot on left leg(shin) it was soft but now getting hard.        Health Maintenance Due   Topic Date Due    Colorectal Cancer Screen  Never done    Shingles vaccine (1 of 2) Never done    Respiratory Syncytial Virus (RSV) Pregnant or age 60 yrs+ (1 - 1-dose 60+ series) Never done    Pneumococcal 65+ years Vaccine (2 - PCV) 2017    Flu vaccine (1) 2023    COVID-19 Vaccine (4 - -24 season) 2023    Annual Wellness Visit (Medicare Advantage)  2024          No questionnaires available.                        
Hypertension     Lipoma 11/23/2015    Nausea & vomiting     ONLY NAUSEA    PUD (peptic ulcer disease)      Past Surgical History:   Procedure Laterality Date    GI      COLONOSCOPY    GYN      EXPLORATORY LAP    OTHER SURGICAL HISTORY  1/21/16     CHRISTIANO for pinched nerve in back    OTHER SURGICAL HISTORY  1988    removed lipoma of head    OTHER SURGICAL HISTORY  1/27/16    EXCISE RECURRENT LIPOMA POSTERIOR SCALP    RETINAL DETACHMENT SURGERY Left      Family History   Problem Relation Age of Onset    Hypertension Sister     Cancer Father         leukemia    Kidney Disease Mother         WAS ON DIALYSIS    Hypertension Mother     Other Brother         BRAIN ANEURYSM    Anesth Problems Neg Hx     Hypertension Brother      Social History     Tobacco Use    Smoking status: Never    Smokeless tobacco: Never   Substance Use Topics    Alcohol use: Yes     Alcohol/week: 4.0 standard drinks of alcohol           Review of Systems   Musculoskeletal:  Positive for arthralgias.   Skin:         Knot to left shin         Physical Exam  Constitutional:       General: She is not in acute distress.     Appearance: Normal appearance.   HENT:      Head: Normocephalic.      Right Ear: External ear normal.      Left Ear: External ear normal.   Cardiovascular:      Rate and Rhythm: Normal rate and regular rhythm.      Heart sounds: Normal heart sounds.   Pulmonary:      Effort: Pulmonary effort is normal.      Breath sounds: Normal breath sounds.   Musculoskeletal:        Legs:       Comments: 1cm firm, slightly tender knot to left shin. No erythema or bruising.   Skin:            Comments: Itchy, dry skin to right side of back. No bumps/lesions noted.   Neurological:      Mental Status: She is alert and oriented to person, place, and time. Mental status is at baseline.   Psychiatric:         Mood and Affect: Mood normal.         Behavior: Behavior normal.

## 2024-02-28 ENCOUNTER — TELEPHONE (OUTPATIENT)
Dept: PRIMARY CARE CLINIC | Facility: CLINIC | Age: 74
End: 2024-02-28

## 2024-02-28 DIAGNOSIS — I10 ESSENTIAL (PRIMARY) HYPERTENSION: ICD-10-CM

## 2024-02-28 NOTE — TELEPHONE ENCOUNTER
Patient called requesting a referral to go see an endocrinologist to discuss her diabetes. Stated she found one named Alisha Watson, fax number [phone]. Patient can be reached at [phone]

## 2024-02-29 RX ORDER — OLMESARTAN MEDOXOMIL 20 MG/1
20 TABLET ORAL DAILY
Qty: 90 TABLET | Refills: 1 | Status: SHIPPED | OUTPATIENT
Start: 2024-02-29

## 2024-04-10 ENCOUNTER — OFFICE VISIT (OUTPATIENT)
Dept: PRIMARY CARE CLINIC | Facility: CLINIC | Age: 74
End: 2024-04-10
Payer: MEDICARE

## 2024-04-10 VITALS
HEART RATE: 73 BPM | OXYGEN SATURATION: 98 % | RESPIRATION RATE: 16 BRPM | HEIGHT: 69 IN | WEIGHT: 199 LBS | BODY MASS INDEX: 29.47 KG/M2 | DIASTOLIC BLOOD PRESSURE: 65 MMHG | SYSTOLIC BLOOD PRESSURE: 120 MMHG | TEMPERATURE: 97.8 F

## 2024-04-10 DIAGNOSIS — K04.7 DENTAL ABSCESS: Primary | ICD-10-CM

## 2024-04-10 PROCEDURE — G8419 CALC BMI OUT NRM PARAM NOF/U: HCPCS | Performed by: NURSE PRACTITIONER

## 2024-04-10 PROCEDURE — 1090F PRES/ABSN URINE INCON ASSESS: CPT | Performed by: NURSE PRACTITIONER

## 2024-04-10 PROCEDURE — 3078F DIAST BP <80 MM HG: CPT | Performed by: NURSE PRACTITIONER

## 2024-04-10 PROCEDURE — 99213 OFFICE O/P EST LOW 20 MIN: CPT | Performed by: NURSE PRACTITIONER

## 2024-04-10 PROCEDURE — G8399 PT W/DXA RESULTS DOCUMENT: HCPCS | Performed by: NURSE PRACTITIONER

## 2024-04-10 PROCEDURE — 3017F COLORECTAL CA SCREEN DOC REV: CPT | Performed by: NURSE PRACTITIONER

## 2024-04-10 PROCEDURE — G8427 DOCREV CUR MEDS BY ELIG CLIN: HCPCS | Performed by: NURSE PRACTITIONER

## 2024-04-10 PROCEDURE — 1123F ACP DISCUSS/DSCN MKR DOCD: CPT | Performed by: NURSE PRACTITIONER

## 2024-04-10 PROCEDURE — 3074F SYST BP LT 130 MM HG: CPT | Performed by: NURSE PRACTITIONER

## 2024-04-10 PROCEDURE — 1036F TOBACCO NON-USER: CPT | Performed by: NURSE PRACTITIONER

## 2024-04-10 RX ORDER — BUDESONIDE 0.5 MG/2ML
INHALANT ORAL
COMMUNITY
Start: 2023-01-05

## 2024-04-10 RX ORDER — IPRATROPIUM BROMIDE AND ALBUTEROL SULFATE 2.5; .5 MG/3ML; MG/3ML
SOLUTION RESPIRATORY (INHALATION)
COMMUNITY
Start: 2023-02-22 | End: 2024-04-10

## 2024-04-10 RX ORDER — LIDOCAINE HYDROCHLORIDE 20 MG/ML
15 SOLUTION OROPHARYNGEAL PRN
Qty: 20 ML | Refills: 0 | Status: SHIPPED | OUTPATIENT
Start: 2024-04-10

## 2024-04-10 RX ORDER — FLUTICASONE FUROATE, UMECLIDINIUM BROMIDE AND VILANTEROL TRIFENATATE 200; 62.5; 25 UG/1; UG/1; UG/1
POWDER RESPIRATORY (INHALATION)
COMMUNITY
Start: 2024-03-04 | End: 2024-04-10

## 2024-04-10 RX ORDER — PANTOPRAZOLE SODIUM 40 MG/1
40 TABLET, DELAYED RELEASE ORAL
COMMUNITY

## 2024-04-10 RX ORDER — CLARITHROMYCIN 500 MG/1
500 TABLET, COATED ORAL 2 TIMES DAILY
Qty: 20 TABLET | Refills: 0 | Status: SHIPPED | OUTPATIENT
Start: 2024-04-10 | End: 2024-04-20

## 2024-04-10 SDOH — ECONOMIC STABILITY: FOOD INSECURITY: WITHIN THE PAST 12 MONTHS, YOU WORRIED THAT YOUR FOOD WOULD RUN OUT BEFORE YOU GOT MONEY TO BUY MORE.: NEVER TRUE

## 2024-04-10 SDOH — ECONOMIC STABILITY: FOOD INSECURITY: WITHIN THE PAST 12 MONTHS, THE FOOD YOU BOUGHT JUST DIDN'T LAST AND YOU DIDN'T HAVE MONEY TO GET MORE.: NEVER TRUE

## 2024-04-10 SDOH — ECONOMIC STABILITY: INCOME INSECURITY: HOW HARD IS IT FOR YOU TO PAY FOR THE VERY BASICS LIKE FOOD, HOUSING, MEDICAL CARE, AND HEATING?: NOT HARD AT ALL

## 2024-04-10 ASSESSMENT — PATIENT HEALTH QUESTIONNAIRE - PHQ9
SUM OF ALL RESPONSES TO PHQ QUESTIONS 1-9: 0
1. LITTLE INTEREST OR PLEASURE IN DOING THINGS: NOT AT ALL
SUM OF ALL RESPONSES TO PHQ9 QUESTIONS 1 & 2: 0
2. FEELING DOWN, DEPRESSED OR HOPELESS: NOT AT ALL
SUM OF ALL RESPONSES TO PHQ QUESTIONS 1-9: 0

## 2024-04-10 NOTE — PROGRESS NOTES
Luba Tejada is a 73 y.o. female who was seen in clinic today (4/10/2024).    Assessment & Plan:   Below is the assessment and plan developed based on review of pertinent history, physical exam, labs, studies, and medications.    1. Dental abscess  Comments:  will treat with antibioitics due to inflammation and swelling in gums.  referral to oral surgery placed to see if we can get her in sooner  start probiotic.  Orders:  -     External Referral To Oral Maxillofacial Surgery  -     clarithromycin (BIAXIN) 500 MG tablet; Take 1 tablet by mouth 2 times daily for 10 days, Disp-20 tablet, R-0Normal  -     lidocaine viscous hcl (XYLOCAINE) 2 % SOLN solution; Take 15 mLs by mouth as needed for Dental Pain, Disp-20 mL, R-0Normal      No follow-ups on file.    Subjective:   Luba was seen today for Otalgia (Patient states she has an tooth infection and she has a lot pain)     Patient reported that she is having pain in her left lower teeth that is causing pain in her left jaw and side of her face. Patient reported she was recently on cl[facility].  Then saw a dentist last week and was prescribed azithromycin.  Patient reported that she was referred to oral surgeon (Dr. Anderson)  however she can't get an appointment until May 8th.  She does not have dental, needs oral surgeon who takes her [employer] plan.     Review of Systems   HENT:  Positive for dental problem and ear pain.    Neurological:  Positive for headaches.          Objective:     Vitals:    04/10/24 1431   BP: 120/65   Site: Right Upper Arm   Position: Sitting   Pulse: 73   Resp: 16   Temp: 97.8 °F (36.6 °C)   TempSrc: Oral   SpO2: 98%   Weight: 90.3 kg (199 lb)   Height: 1.753 m (5' 9\")      Body mass index is 29.39 kg/m².     Physical Exam  Constitutional:       Appearance: Normal appearance.   HENT:      Head: Normocephalic.      Mouth/Throat:      Dentition: Abnormal dentition. Dental tenderness, gingival swelling and dental caries present.

## 2024-04-10 NOTE — PROGRESS NOTES
Chief Complaint   Patient presents with    Otalgia     Patient states she has an tooth infection and she has a lot pain     /65 (Site: Right Upper Arm, Position: Sitting)   Pulse 73   Temp 97.8 °F (36.6 °C) (Oral)   Resp 16   Ht 1.753 m (5' 9\")   Wt 90.3 kg (199 lb)   SpO2 98%   BMI 29.39 kg/m²     \"Have you been to the ER, urgent care clinic since your last visit?  Hospitalized since your last visit?\"    NO    “Have you seen or consulted any other health care providers outside of [facility] since your last visit?”    NO        “Have you had a colorectal cancer screening such as a colonoscopy/FIT/Cologuard?    NO    No colonoscopy on file  No cologuard on file  No FIT/FOBT on file   No flexible sigmoidoscopy on file         Click Here for Release of Records Request

## 2024-04-11 ENCOUNTER — TELEPHONE (OUTPATIENT)
Dept: PRIMARY CARE CLINIC | Facility: CLINIC | Age: 74
End: 2024-04-11

## 2024-04-11 NOTE — TELEPHONE ENCOUNTER
Patient is calling because she was told that she needs a prior auth for one of her medications that was prescribed to her yesterday. Patient didn't know the name of mediacation she just states its for tooth pain.   clarithromycin (BIAXIN) 500 MG tablet and lidocaine viscous hcl (XYLOCAINE) 2 % SOLN solution are the 2 meds that I see.

## 2024-04-12 ENCOUNTER — TELEPHONE (OUTPATIENT)
Dept: PRIMARY CARE CLINIC | Facility: CLINIC | Age: 74
End: 2024-04-12

## 2024-04-12 NOTE — TELEPHONE ENCOUNTER
Lidocaine was sent in as as needed take 15ml but only 20ml was sent pharmacy needs clarification [facility].

## 2024-04-12 NOTE — TELEPHONE ENCOUNTER
Pharmacy called stating that they need more clarity on the prescription for lidocaine viscous hcl (XYLOCAINE) 2 % SOLN.

## 2024-05-30 DIAGNOSIS — I10 ESSENTIAL (PRIMARY) HYPERTENSION: ICD-10-CM

## 2024-05-30 RX ORDER — AMLODIPINE BESYLATE 10 MG/1
10 TABLET ORAL DAILY
Qty: 90 TABLET | Refills: 1 | Status: SHIPPED | OUTPATIENT
Start: 2024-05-30

## 2024-07-03 PROBLEM — E55.9 VITAMIN D DEFICIENCY: Status: ACTIVE | Noted: 2024-07-03

## 2024-07-17 ENCOUNTER — COMMUNITY OUTREACH (OUTPATIENT)
Dept: PRIMARY CARE CLINIC | Facility: CLINIC | Age: 74
End: 2024-07-17

## 2024-07-18 ENCOUNTER — LAB (OUTPATIENT)
Age: 74
End: 2024-07-18

## 2024-07-18 DIAGNOSIS — M88.9 PAGET'S DISEASE OF BONE: ICD-10-CM

## 2024-07-18 DIAGNOSIS — E55.9 VITAMIN D DEFICIENCY: ICD-10-CM

## 2024-07-18 DIAGNOSIS — M81.0 AGE-RELATED OSTEOPOROSIS WITHOUT CURRENT PATHOLOGICAL FRACTURE: ICD-10-CM

## 2024-07-19 LAB
25(OH)D3 SERPL-MCNC: 37 NG/ML (ref 30–100)
ALBUMIN SERPL-MCNC: 3.9 G/DL (ref 3.5–5)
ALBUMIN/GLOB SERPL: 1 (ref 1.1–2.2)
ALP SERPL-CCNC: 68 U/L (ref 45–117)
ALT SERPL-CCNC: 30 U/L (ref 12–78)
ANION GAP SERPL CALC-SCNC: 9 MMOL/L (ref 5–15)
AST SERPL-CCNC: 20 U/L (ref 15–37)
BILIRUB SERPL-MCNC: 0.6 MG/DL (ref 0.2–1)
BUN SERPL-MCNC: 13 MG/DL (ref 6–20)
BUN/CREAT SERPL: 15 (ref 12–20)
CALCIUM SERPL-MCNC: 10.1 MG/DL (ref 8.5–10.1)
CHLORIDE SERPL-SCNC: 105 MMOL/L (ref 97–108)
CO2 SERPL-SCNC: 26 MMOL/L (ref 21–32)
CREAT SERPL-MCNC: 0.87 MG/DL (ref 0.55–1.02)
GLOBULIN SER CALC-MCNC: 4 G/DL (ref 2–4)
GLUCOSE SERPL-MCNC: 89 MG/DL (ref 65–100)
POTASSIUM SERPL-SCNC: 4.6 MMOL/L (ref 3.5–5.1)
PROT SERPL-MCNC: 7.9 G/DL (ref 6.4–8.2)
SODIUM SERPL-SCNC: 140 MMOL/L (ref 136–145)

## 2024-07-22 NOTE — PROGRESS NOTES
RECURRENT LIPOMA POSTERIOR SCALP    RETINAL DETACHMENT SURGERY Left      Current Outpatient Medications   Medication Sig Dispense Refill    amLODIPine (NORVASC) 10 MG tablet TAKE 1 TABLET BY MOUTH EVERY DAY 90 tablet 1    budesonide (PULMICORT) 0.5 MG/2ML nebulizer suspension Take 2 mLs by nebulization as needed      fexofenadine (ALLEGRA) 180 MG tablet Take 1 tablet by mouth daily      fluticasone (FLONASE) 50 MCG/ACT nasal spray 2 sprays by Nasal route as needed      sodium chloride (OCEAN) 0.65 % nasal spray 1 spray as needed      albuterol sulfate HFA (PROVENTIL;VENTOLIN;PROAIR) 108 (90 Base) MCG/ACT inhaler Inhale 1 puff into the lungs every 4 hours as needed      Cholecalciferol 50 MCG (2000 UT) TABS Take by mouth      fluticasone-umeclidin-vilant (TRELEGY ELLIPTA) 100-62.5-25 MCG/ACT AEPB inhaler Inhale 1 puff into the lungs daily      pantoprazole (PROTONIX) 40 MG tablet Take 1 tablet by mouth every morning (before breakfast)      lidocaine viscous hcl (XYLOCAINE) 2 % SOLN solution Take 15 mLs by mouth as needed for Dental Pain (Patient not taking: Reported on 7/23/2024) 20 mL 0    olmesartan (BENICAR) 20 MG tablet TAKE 1 TABLET BY MOUTH EVERY DAY (Patient not taking: Reported on 4/10/2024) 90 tablet 1    triamcinolone (KENALOG) 0.1 % ointment Apply topically 2 times daily to affected after.  Do not use for >2 weeks consecutively. (Patient not taking: Reported on 7/23/2024) 80 g 0    Semaglutide,0.25 or 0.5MG/DOS, (OZEMPIC, 0.25 OR 0.5 MG/DOSE,) 2 MG/3ML SOPN INJECT 0.25 MG SUBCUTANEOUSLY EVERY 7 DAYS (Patient not taking: Reported on 4/10/2024) 3 mL 1    glucose monitoring kit 1 kit by Does not apply route daily Use for once daily glucose testing. E11.9 (Patient not taking: Reported on 4/10/2024) 1 kit 0    blood glucose monitor strips Use for once daily glucose testing. E11.9 (Patient not taking: Reported on 4/10/2024) 100 strip 3    Lancet Device MISC 1 Device by Does not apply route once for 1 dose Use

## 2024-07-23 ENCOUNTER — OFFICE VISIT (OUTPATIENT)
Age: 74
End: 2024-07-23

## 2024-07-23 VITALS
WEIGHT: 201 LBS | RESPIRATION RATE: 18 BRPM | HEART RATE: 68 BPM | TEMPERATURE: 97.5 F | DIASTOLIC BLOOD PRESSURE: 60 MMHG | HEIGHT: 69 IN | BODY MASS INDEX: 29.77 KG/M2 | OXYGEN SATURATION: 97 % | SYSTOLIC BLOOD PRESSURE: 125 MMHG

## 2024-07-23 DIAGNOSIS — M88.9 PAGET'S DISEASE OF BONE: ICD-10-CM

## 2024-07-23 DIAGNOSIS — E55.9 VITAMIN D DEFICIENCY: ICD-10-CM

## 2024-07-23 DIAGNOSIS — M81.0 AGE-RELATED OSTEOPOROSIS WITHOUT CURRENT PATHOLOGICAL FRACTURE: ICD-10-CM

## 2024-07-23 DIAGNOSIS — M81.8 OTHER OSTEOPOROSIS WITHOUT CURRENT PATHOLOGICAL FRACTURE: Primary | ICD-10-CM

## 2024-07-23 NOTE — PATIENT INSTRUCTIONS
I have ordered scan/test and if you do not hear from the hospital in 3- 4 business days  please call the number [phone] to speak with the scheduling team directly.

## 2024-08-05 NOTE — PATIENT INSTRUCTIONS
----- Message from Kimberlyn Goldstein sent at 10/4/2021  4:23 PM EDT -----  Regarding: 3 year repeat colonoscopy  Please contact patient to schedule 3 year repeat colonoscopy consult with Dr Hall. Last colonoscopy 9/13/2021.     Medicare Wellness Visit, Female     The best way to live healthy is to have a lifestyle where you eat a well-balanced diet, exercise regularly, limit alcohol use, and quit all forms of tobacco/nicotine, if applicable. Regular preventive services are another way to keep healthy. Preventive services (vaccines, screening tests, monitoring & exams) can help personalize your care plan, which helps you manage your own care. Screening tests can find health problems at the earliest stages, when they are easiest to treat. [facility] follows the current, evidence-based guidelines published by the [facility] R[facility] Contreras ([facility]) when recommending preventive services for our patients. Because we follow these guidelines, sometimes recommendations change over time as research supports it. (For example, mammograms used to be recommended annually. Even though Medicare will still pay for an annual mammogram, the newer guidelines recommend a mammogram every two years for women of average risk). Of course, you and your doctor may decide to screen more often for some diseases, based on your risk and your co-morbidities (chronic disease you are already diagnosed with). Preventive services for you include:  - Medicare offers their members a free annual wellness visit, which is time for you and your primary care provider to discuss and plan for your preventive service needs. Take advantage of this benefit every year!  -All adults over the age of 72 should receive the recommended pneumonia vaccines. Current USPSTF guidelines recommend a series of two vaccines for the best pneumonia protection.   -All adults should have a flu vaccine yearly and a tetanus vaccine every 10 years.   -All adults age 48 and older should receive the shingles vaccines (series of two vaccines).       -All adults age 38-68 who are overweight should have a diabetes screening test once every three years.   -All adults born between [birth year] and 1965 should be screened once for Hepatitis C.  -Other screening tests and preventive services for persons with diabetes include: an eye exam to screen for diabetic retinopathy, a kidney function test, a foot exam, and stricter control over your cholesterol.   -Cardiovascular screening for adults with routine risk involves an electrocardiogram (ECG) at intervals determined by your doctor.   -Colorectal cancer screenings should be done for adults age 54-65 with no increased risk factors for colorectal cancer. There are a number of acceptable methods of screening for this type of cancer. Each test has its own benefits and drawbacks. Discuss with your doctor what is most appropriate for you during your annual wellness visit. The different tests include: colonoscopy (considered the best screening method), a fecal occult blood test, a fecal DNA test, and sigmoidoscopy.    -A bone mass density test is recommended when a woman turns 65 to screen for osteoporosis. This test is only recommended one time, as a screening. Some providers will use this same test as a disease monitoring tool if you already have osteoporosis. -Breast cancer screenings are recommended every other year for women of normal risk, age 54-69.  -Cervical cancer screenings for women over age 72 are only recommended with certain risk factors.      Here is a list of your current Health Maintenance items (your personalized list of preventive services) with a due date:  Health Maintenance Due   Topic Date Due    Hepatitis C Test  1950    Colorectal Screening  06/19/2001    Glaucoma Screening   06/19/2016    Mammogram  11/16/2017    Annual Well Visit  09/22/2019

## 2024-08-29 DIAGNOSIS — I10 ESSENTIAL (PRIMARY) HYPERTENSION: ICD-10-CM

## 2024-08-29 RX ORDER — OLMESARTAN MEDOXOMIL 20 MG/1
20 TABLET ORAL DAILY
Qty: 90 TABLET | Refills: 1 | Status: SHIPPED | OUTPATIENT
Start: 2024-08-29

## 2024-08-30 RX ORDER — AMLODIPINE BESYLATE 10 MG/1
10 TABLET ORAL DAILY
Qty: 90 TABLET | Refills: 0 | Status: SHIPPED | OUTPATIENT
Start: 2024-08-30

## 2024-09-12 ENCOUNTER — HOSPITAL ENCOUNTER (OUTPATIENT)
Facility: HOSPITAL | Age: 74
Discharge: HOME | End: 2024-09-15
Attending: INTERNAL MEDICINE
Payer: MEDICARE

## 2024-09-12 VITALS — WEIGHT: 201 LBS | BODY MASS INDEX: 29.77 KG/M2 | HEIGHT: 69 IN

## 2024-09-12 DIAGNOSIS — M81.0 AGE-RELATED OSTEOPOROSIS WITHOUT CURRENT PATHOLOGICAL FRACTURE: ICD-10-CM

## 2024-09-12 PROCEDURE — 77080 DXA BONE DENSITY AXIAL: CPT

## 2025-03-05 DIAGNOSIS — I10 ESSENTIAL (PRIMARY) HYPERTENSION: ICD-10-CM

## 2025-03-06 ENCOUNTER — TELEPHONE (OUTPATIENT)
Dept: PRIMARY CARE CLINIC | Facility: CLINIC | Age: 75
End: 2025-03-06

## 2025-03-06 ENCOUNTER — OFFICE VISIT (OUTPATIENT)
Dept: PRIMARY CARE CLINIC | Facility: CLINIC | Age: 75
End: 2025-03-06

## 2025-03-06 VITALS
HEIGHT: 69 IN | DIASTOLIC BLOOD PRESSURE: 80 MMHG | HEART RATE: 65 BPM | TEMPERATURE: 99.3 F | BODY MASS INDEX: 30.96 KG/M2 | SYSTOLIC BLOOD PRESSURE: 134 MMHG | OXYGEN SATURATION: 97 % | WEIGHT: 209 LBS

## 2025-03-06 DIAGNOSIS — K59.09 CHRONIC CONSTIPATION: ICD-10-CM

## 2025-03-06 DIAGNOSIS — J45.41 MODERATE PERSISTENT ASTHMA WITH ACUTE EXACERBATION: ICD-10-CM

## 2025-03-06 DIAGNOSIS — I10 ESSENTIAL (PRIMARY) HYPERTENSION: ICD-10-CM

## 2025-03-06 DIAGNOSIS — Z00.00 MEDICARE ANNUAL WELLNESS VISIT, SUBSEQUENT: Primary | ICD-10-CM

## 2025-03-06 DIAGNOSIS — R73.03 PREDIABETES: ICD-10-CM

## 2025-03-06 DIAGNOSIS — E66.9 OBESITY (BMI 30-39.9): ICD-10-CM

## 2025-03-06 RX ORDER — OLMESARTAN MEDOXOMIL 20 MG/1
20 TABLET ORAL DAILY
Qty: 90 TABLET | Refills: 1 | Status: SHIPPED | OUTPATIENT
Start: 2025-03-06 | End: 2025-03-06 | Stop reason: SDUPTHER

## 2025-03-06 RX ORDER — PREDNISONE 20 MG/1
60 TABLET ORAL DAILY
Qty: 15 TABLET | Refills: 0 | Status: SHIPPED | OUTPATIENT
Start: 2025-03-06 | End: 2025-03-11

## 2025-03-06 RX ORDER — AMLODIPINE BESYLATE 10 MG/1
10 TABLET ORAL DAILY
Qty: 90 TABLET | Refills: 0 | Status: SHIPPED | OUTPATIENT
Start: 2025-03-06

## 2025-03-06 RX ORDER — FAMOTIDINE 20 MG/1
TABLET, FILM COATED ORAL
COMMUNITY
Start: 2024-12-19

## 2025-03-06 RX ORDER — OLMESARTAN MEDOXOMIL 20 MG/1
20 TABLET ORAL DAILY
Qty: 90 TABLET | Refills: 1 | Status: SHIPPED | OUTPATIENT
Start: 2025-03-06

## 2025-03-06 RX ORDER — DEXTROMETHORPHAN HYDROBROMIDE AND PROMETHAZINE HYDROCHLORIDE 15; 6.25 MG/5ML; MG/5ML
5 SYRUP ORAL 4 TIMES DAILY PRN
Qty: 150 ML | Refills: 0 | Status: SHIPPED | OUTPATIENT
Start: 2025-03-06 | End: 2025-03-13

## 2025-03-06 SDOH — ECONOMIC STABILITY: FOOD INSECURITY: WITHIN THE PAST 12 MONTHS, YOU WORRIED THAT YOUR FOOD WOULD RUN OUT BEFORE YOU GOT MONEY TO BUY MORE.: NEVER TRUE

## 2025-03-06 SDOH — ECONOMIC STABILITY: FOOD INSECURITY: WITHIN THE PAST 12 MONTHS, THE FOOD YOU BOUGHT JUST DIDN'T LAST AND YOU DIDN'T HAVE MONEY TO GET MORE.: NEVER TRUE

## 2025-03-06 ASSESSMENT — LIFESTYLE VARIABLES
HOW OFTEN DO YOU HAVE A DRINK CONTAINING ALCOHOL: NEVER
HOW MANY STANDARD DRINKS CONTAINING ALCOHOL DO YOU HAVE ON A TYPICAL DAY: PATIENT DOES NOT DRINK

## 2025-03-06 ASSESSMENT — PATIENT HEALTH QUESTIONNAIRE - PHQ9
SUM OF ALL RESPONSES TO PHQ QUESTIONS 1-9: 0
2. FEELING DOWN, DEPRESSED OR HOPELESS: NOT AT ALL
1. LITTLE INTEREST OR PLEASURE IN DOING THINGS: NOT AT ALL

## 2025-03-06 NOTE — PATIENT INSTRUCTIONS
2 BMI units.   MINIMUM GOAL OF WEIGHT LOSS: Reduce body weight and maintain a lower body weight. Prevent weight gain.

## 2025-03-06 NOTE — PROGRESS NOTES
Medicare Annual Wellness Visit    Luba Tejada is here for Medicare AWV (Patient has a slight asthma flare up. She would like for provider to check her chest. )    Assessment & Plan   Medicare annual wellness visit, subsequent  Essential (primary) hypertension  Comments:  BP at goal  Prediabetes  Comments:  will recheck a1c  Orders:  -     CBC  -     Comprehensive Metabolic Panel  -     Hemoglobin A1C  -     Lipid Panel  Moderate persistent asthma with acute exacerbation  Comments:  will treat acute exacerbation with prednisone. Red flag sx reviewed  Orders:  -     predniSONE (DELTASONE) 20 MG tablet; Take 3 tablets by mouth daily for 5 days, Disp-15 tablet, R-0Normal  -     promethazine-dextromethorphan (PROMETHAZINE-DM) 6.25-15 MG/5ML syrup; Take 5 mLs by mouth 4 times daily as needed for Cough, Disp-150 mL, R-0Normal  Obesity (BMI 30-39.9)  Comments:  Discussed diet changes; provided patient with hand out as well  Chronic constipation  Comments:  She will ask GI about going back on linzess.        Return in about 6 months (around 9/6/2025) for COV.     Subjective   The following acute and/or chronic problems were also addressed today:  Prediabetes: currently managing with diabetes.  Hemoglobin A1C   Date Value Ref Range Status   12/21/2023 5.7 (H) 4.8 - 5.6 % Final     Comment:                 Prediabetes: 5.7 - 6.4           Diabetes: >6.4           Glycemic control for adults with diabetes: <7.0       Obesity: patient reports she would like GLP-1 for weight loss. Feels all her weight is in her mid section. Unable to exercise d/t knee pain. Reports she tries to eat healthy, sometimes struggles.  Breakfast: yogurt, banana  Lunch: baked chicken, salad  Dinner: fish, vegetable    Patient has been seeing GI for her abdominal bloating and constipation. Reports chronic constipation, previously did take linzess. She had a colonoscopy a few months ago and was started on pepcid. She has follow up in 1 month.

## 2025-03-06 NOTE — TELEPHONE ENCOUNTER
The patient is requesting a refill on Amlodipine 10mg and Olmesartan 20mg to [facility] in [place]. Thanks

## 2025-03-07 LAB
ALBUMIN SERPL-MCNC: 4.4 G/DL (ref 3.8–4.8)
ALP SERPL-CCNC: 76 IU/L (ref 44–121)
ALT SERPL-CCNC: 14 IU/L (ref 0–32)
AST SERPL-CCNC: 18 IU/L (ref 0–40)
BILIRUB SERPL-MCNC: 0.3 MG/DL (ref 0–1.2)
BUN SERPL-MCNC: 17 MG/DL (ref 8–27)
BUN/CREAT SERPL: 20 (ref 12–28)
CALCIUM SERPL-MCNC: 10 MG/DL (ref 8.7–10.3)
CHLORIDE SERPL-SCNC: 105 MMOL/L (ref 96–106)
CHOLEST SERPL-MCNC: 206 MG/DL (ref 100–199)
CO2 SERPL-SCNC: 24 MMOL/L (ref 20–29)
CREAT SERPL-MCNC: 0.83 MG/DL (ref 0.57–1)
EGFRCR SERPLBLD CKD-EPI 2021: 74 ML/MIN/1.73
ERYTHROCYTE [DISTWIDTH] IN BLOOD BY AUTOMATED COUNT: 14.7 % (ref 11.7–15.4)
GLOBULIN SER CALC-MCNC: 3 G/DL (ref 1.5–4.5)
GLUCOSE SERPL-MCNC: 87 MG/DL (ref 70–99)
HBA1C MFR BLD: 5.9 % (ref 4.8–5.6)
HCT VFR BLD AUTO: 37.7 % (ref 34–46.6)
HDLC SERPL-MCNC: 70 MG/DL
HGB BLD-MCNC: 12.2 G/DL (ref 11.1–15.9)
LDLC SERPL CALC-MCNC: 123 MG/DL (ref 0–99)
MCH RBC QN AUTO: 28 PG (ref 26.6–33)
MCHC RBC AUTO-ENTMCNC: 32.4 G/DL (ref 31.5–35.7)
MCV RBC AUTO: 87 FL (ref 79–97)
PLATELET # BLD AUTO: 295 X10E3/UL (ref 150–450)
POTASSIUM SERPL-SCNC: 4.6 MMOL/L (ref 3.5–5.2)
PROT SERPL-MCNC: 7.4 G/DL (ref 6–8.5)
RBC # BLD AUTO: 4.35 X10E6/UL (ref 3.77–5.28)
SODIUM SERPL-SCNC: 142 MMOL/L (ref 134–144)
TRIGL SERPL-MCNC: 72 MG/DL (ref 0–149)
VLDLC SERPL CALC-MCNC: 13 MG/DL (ref 5–40)
WBC # BLD AUTO: 4.3 X10E3/UL (ref 3.4–10.8)

## 2025-03-10 ENCOUNTER — RESULTS FOLLOW-UP (OUTPATIENT)
Dept: PRIMARY CARE CLINIC | Facility: CLINIC | Age: 75
End: 2025-03-10

## 2025-05-28 DIAGNOSIS — I10 ESSENTIAL (PRIMARY) HYPERTENSION: ICD-10-CM

## 2025-05-28 RX ORDER — AMLODIPINE BESYLATE 10 MG/1
10 TABLET ORAL DAILY
Qty: 90 TABLET | Refills: 0 | Status: SHIPPED | OUTPATIENT
Start: 2025-05-28

## 2025-07-24 ENCOUNTER — LAB (OUTPATIENT)
Age: 75
End: 2025-07-24

## 2025-07-24 DIAGNOSIS — M81.0 AGE-RELATED OSTEOPOROSIS WITHOUT CURRENT PATHOLOGICAL FRACTURE: ICD-10-CM

## 2025-07-24 DIAGNOSIS — E55.9 VITAMIN D DEFICIENCY: ICD-10-CM

## 2025-07-25 LAB
25(OH)D3 SERPL-MCNC: 35.3 NG/ML (ref 30–100)
ANION GAP SERPL CALC-SCNC: 6 MMOL/L (ref 2–12)
BUN SERPL-MCNC: 20 MG/DL (ref 6–20)
BUN/CREAT SERPL: 21 (ref 12–20)
CALCIUM SERPL-MCNC: 10.2 MG/DL (ref 8.5–10.1)
CHLORIDE SERPL-SCNC: 107 MMOL/L (ref 97–108)
CO2 SERPL-SCNC: 25 MMOL/L (ref 21–32)
CREAT SERPL-MCNC: 0.94 MG/DL (ref 0.55–1.02)
GLUCOSE SERPL-MCNC: 85 MG/DL (ref 65–100)
POTASSIUM SERPL-SCNC: 4 MMOL/L (ref 3.5–5.1)
SODIUM SERPL-SCNC: 138 MMOL/L (ref 136–145)

## 2025-07-29 ENCOUNTER — OFFICE VISIT (OUTPATIENT)
Age: 75
End: 2025-07-29
Payer: MEDICARE

## 2025-07-29 VITALS
HEART RATE: 58 BPM | DIASTOLIC BLOOD PRESSURE: 71 MMHG | TEMPERATURE: 98.1 F | HEIGHT: 69 IN | SYSTOLIC BLOOD PRESSURE: 140 MMHG | WEIGHT: 189.6 LBS | BODY MASS INDEX: 28.08 KG/M2 | OXYGEN SATURATION: 99 %

## 2025-07-29 DIAGNOSIS — E55.9 VITAMIN D DEFICIENCY: ICD-10-CM

## 2025-07-29 DIAGNOSIS — M88.9 PAGET'S DISEASE OF BONE: ICD-10-CM

## 2025-07-29 DIAGNOSIS — M81.8 OTHER OSTEOPOROSIS WITHOUT CURRENT PATHOLOGICAL FRACTURE: Primary | ICD-10-CM

## 2025-07-29 PROCEDURE — G2211 COMPLEX E/M VISIT ADD ON: HCPCS | Performed by: INTERNAL MEDICINE

## 2025-07-29 PROCEDURE — 1159F MED LIST DOCD IN RCRD: CPT | Performed by: INTERNAL MEDICINE

## 2025-07-29 PROCEDURE — 3077F SYST BP >= 140 MM HG: CPT | Performed by: INTERNAL MEDICINE

## 2025-07-29 PROCEDURE — 3078F DIAST BP <80 MM HG: CPT | Performed by: INTERNAL MEDICINE

## 2025-07-29 PROCEDURE — 1126F AMNT PAIN NOTED NONE PRSNT: CPT | Performed by: INTERNAL MEDICINE

## 2025-07-29 PROCEDURE — 99214 OFFICE O/P EST MOD 30 MIN: CPT | Performed by: INTERNAL MEDICINE

## 2025-07-29 PROCEDURE — 1123F ACP DISCUSS/DSCN MKR DOCD: CPT | Performed by: INTERNAL MEDICINE

## 2025-07-29 PROCEDURE — 1160F RVW MEDS BY RX/DR IN RCRD: CPT | Performed by: INTERNAL MEDICINE

## 2025-07-29 NOTE — PROGRESS NOTES
Luba Tejada is a 75 y.o. female here for   Chief Complaint   Patient presents with    Osteoporosis       1. Have you been to the ER, urgent care clinic since your last visit?  Hospitalized since your last visit? - no    2. Have you seen or consulted any other health care providers outside of the [facility] since your last visit?  Include any pap smears or colon screening.- [facility]/OBGYN, Otolarynology

## 2025-07-29 NOTE — PROGRESS NOTES
[facility] DIABETES AND ENDOCRINOLOGY                 Terra Dillon MD           Name : Luba Tejada     YOB: 1950          Referred by: Melia Yepez MD         Chief Complaint   Patient presents with    Osteoporosis     History of Present Illness: Luba Tejada is here for follow-up of Paget's and osteoporosis   July 2025  She completed Reclast 3 doses, DEXA 2024 showed improvement in the lumbar spine, discordant decrease in the distal one third  July 2024  Right hip pain improved, there is no need for any investigation  No falls  No major dental work  She is hydrating well    6/8/23  Intermittent pain in R hip x1 year when walking  No falls  No recent dental work  No kidney stones  Gym 3 days a week     She was found to have osteoporosis based on the BMD in 2018,  Paget's disease which was diagnosed 30 years ago, she was on Fosamax 30 years ago which was discontinued due to GI side effects.  + Arthralgia right hip, intermittent, diagnosed with plantar fasciitis recently  No history of premature menopause  No loss of height  + GERD/ PUD     Hearing loss     Prior therapy : Fosamax, 30 years ago for Pagets      Dietary calcium Intake:  Minimal amount of dairy in her diet.  On calcium and vitamin D supplements.     No history of fragility fractures,chronic steroid use, hyperthyroidism.       No history of excess alcohol or tobacco abuse.  No known history of hypercalcemia,  Kidney stones, family history of kidney stones.    Past Medical History:   Diagnosis Date    Adverse effect of anesthesia     WOKE UP 1X DURING PROCEDURE    Arthritis     Asthma     Environmental allergies     GERD (gastroesophageal reflux disease)     Heart murmur     Hypertension     Lipoma 11/23/2015    Nausea & vomiting     ONLY NAUSEA    PUD (peptic ulcer disease)      Past Surgical History:   Procedure Laterality Date    GI      COLONOSCOPY    GYN      EXPLORATORY LAP    OTHER SURGICAL HISTORY  1/21/16

## 2025-08-20 DIAGNOSIS — I10 ESSENTIAL (PRIMARY) HYPERTENSION: ICD-10-CM

## 2025-08-20 RX ORDER — AMLODIPINE BESYLATE 10 MG/1
10 TABLET ORAL DAILY
Qty: 90 TABLET | Refills: 0 | Status: SHIPPED | OUTPATIENT
Start: 2025-08-20